# Patient Record
Sex: FEMALE | Race: BLACK OR AFRICAN AMERICAN | NOT HISPANIC OR LATINO | Employment: FULL TIME | RURAL
[De-identification: names, ages, dates, MRNs, and addresses within clinical notes are randomized per-mention and may not be internally consistent; named-entity substitution may affect disease eponyms.]

---

## 2021-08-01 ENCOUNTER — HOSPITAL ENCOUNTER (EMERGENCY)
Facility: HOSPITAL | Age: 46
Discharge: HOME | End: 2021-08-01
Payer: OTHER GOVERNMENT

## 2021-08-01 VITALS
HEART RATE: 76 BPM | OXYGEN SATURATION: 98 % | SYSTOLIC BLOOD PRESSURE: 165 MMHG | BODY MASS INDEX: 29.66 KG/M2 | WEIGHT: 189 LBS | DIASTOLIC BLOOD PRESSURE: 95 MMHG | TEMPERATURE: 100 F | HEIGHT: 67 IN | RESPIRATION RATE: 18 BRPM

## 2021-08-01 DIAGNOSIS — E87.6 HYPOKALEMIA: ICD-10-CM

## 2021-08-01 DIAGNOSIS — R11.10 VOMITING, INTRACTABILITY OF VOMITING NOT SPECIFIED, PRESENCE OF NAUSEA NOT SPECIFIED, UNSPECIFIED VOMITING TYPE: ICD-10-CM

## 2021-08-01 DIAGNOSIS — U07.1 COVID-19: Primary | ICD-10-CM

## 2021-08-01 LAB
ANION GAP SERPL CALCULATED.3IONS-SCNC: 17 MMOL/L (ref 7–16)
ATYPICAL LYMPHOCYTES: ABNORMAL
BASOPHILS # BLD AUTO: 0.01 K/UL (ref 0–0.2)
BASOPHILS NFR BLD AUTO: 0.2 % (ref 0–1)
BUN SERPL-MCNC: 7 MG/DL (ref 7–18)
BUN/CREAT SERPL: 10 (ref 6–20)
CALCIUM SERPL-MCNC: 8.1 MG/DL (ref 8.5–10.1)
CHLORIDE SERPL-SCNC: 107 MMOL/L (ref 98–107)
CO2 SERPL-SCNC: 23 MMOL/L (ref 21–32)
CREAT SERPL-MCNC: 0.73 MG/DL (ref 0.55–1.02)
DIFFERENTIAL METHOD BLD: ABNORMAL
EOSINOPHIL # BLD AUTO: 0 K/UL (ref 0–0.5)
EOSINOPHIL NFR BLD AUTO: 0 % (ref 1–4)
ERYTHROCYTE [DISTWIDTH] IN BLOOD BY AUTOMATED COUNT: 13.2 % (ref 11.5–14.5)
FLUAV AG UPPER RESP QL IA.RAPID: NEGATIVE
FLUBV AG UPPER RESP QL IA.RAPID: NEGATIVE
GLUCOSE SERPL-MCNC: 83 MG/DL (ref 74–106)
HCT VFR BLD AUTO: 40.9 % (ref 38–47)
HGB BLD-MCNC: 13.6 G/DL (ref 12–16)
IMM GRANULOCYTES # BLD AUTO: 0.01 K/UL (ref 0–0.04)
IMM GRANULOCYTES NFR BLD: 0.2 % (ref 0–0.4)
LYMPHOCYTES # BLD AUTO: 1.15 K/UL (ref 1–4.8)
LYMPHOCYTES NFR BLD AUTO: 23 % (ref 27–41)
LYMPHOCYTES NFR BLD MANUAL: 22 % (ref 27–41)
MCH RBC QN AUTO: 28.7 PG (ref 27–31)
MCHC RBC AUTO-ENTMCNC: 33.3 G/DL (ref 32–36)
MCV RBC AUTO: 86.3 FL (ref 80–96)
MONOCYTES # BLD AUTO: 0.49 K/UL (ref 0–0.8)
MONOCYTES NFR BLD AUTO: 9.8 % (ref 2–6)
MONOCYTES NFR BLD MANUAL: 9 % (ref 2–6)
MPC BLD CALC-MCNC: 13.6 FL (ref 9.4–12.4)
NEUTROPHILS # BLD AUTO: 3.34 K/UL (ref 1.8–7.7)
NEUTROPHILS NFR BLD AUTO: 66.8 % (ref 53–65)
NEUTS BAND NFR BLD MANUAL: 2 % (ref 1–5)
NEUTS SEG NFR BLD MANUAL: 67 % (ref 50–62)
NRBC # BLD AUTO: 0 X10E3/UL
NRBC, AUTO (.00): 0 %
PLATELET # BLD AUTO: 134 K/UL (ref 150–400)
PLATELET MORPHOLOGY: ABNORMAL
POTASSIUM SERPL-SCNC: 2.8 MMOL/L (ref 3.5–5.1)
RBC # BLD AUTO: 4.74 M/UL (ref 4.2–5.4)
SARS-COV+SARS-COV-2 AG RESP QL IA.RAPID: POSITIVE
SODIUM SERPL-SCNC: 144 MMOL/L (ref 136–145)
WBC # BLD AUTO: 5 K/UL (ref 4.5–11)

## 2021-08-01 PROCEDURE — 96365 THER/PROPH/DIAG IV INF INIT: CPT

## 2021-08-01 PROCEDURE — 25000003 PHARM REV CODE 250: Performed by: NURSE PRACTITIONER

## 2021-08-01 PROCEDURE — 96375 TX/PRO/DX INJ NEW DRUG ADDON: CPT

## 2021-08-01 PROCEDURE — 82374 ASSAY BLOOD CARBON DIOXIDE: CPT | Performed by: NURSE PRACTITIONER

## 2021-08-01 PROCEDURE — 99284 EMERGENCY DEPT VISIT MOD MDM: CPT | Mod: ,,, | Performed by: NURSE PRACTITIONER

## 2021-08-01 PROCEDURE — 99284 EMERGENCY DEPT VISIT MOD MDM: CPT | Mod: 25

## 2021-08-01 PROCEDURE — 85025 COMPLETE CBC W/AUTO DIFF WBC: CPT | Performed by: NURSE PRACTITIONER

## 2021-08-01 PROCEDURE — 96361 HYDRATE IV INFUSION ADD-ON: CPT

## 2021-08-01 PROCEDURE — 36415 COLL VENOUS BLD VENIPUNCTURE: CPT | Performed by: NURSE PRACTITIONER

## 2021-08-01 PROCEDURE — 99284 PR EMERGENCY DEPT VISIT,LEVEL IV: ICD-10-PCS | Mod: ,,, | Performed by: NURSE PRACTITIONER

## 2021-08-01 PROCEDURE — 63600175 PHARM REV CODE 636 W HCPCS: Performed by: NURSE PRACTITIONER

## 2021-08-01 PROCEDURE — 87428 SARSCOV & INF VIR A&B AG IA: CPT | Performed by: NURSE PRACTITIONER

## 2021-08-01 RX ORDER — ONDANSETRON 2 MG/ML
4 INJECTION INTRAMUSCULAR; INTRAVENOUS ONCE
Status: DISCONTINUED | OUTPATIENT
Start: 2021-08-01 | End: 2021-08-01

## 2021-08-01 RX ORDER — POTASSIUM CHLORIDE 20 MEQ/1
40 TABLET, EXTENDED RELEASE ORAL ONCE
Status: COMPLETED | OUTPATIENT
Start: 2021-08-01 | End: 2021-08-01

## 2021-08-01 RX ORDER — POTASSIUM CHLORIDE 7.45 MG/ML
10 INJECTION INTRAVENOUS ONCE
Status: COMPLETED | OUTPATIENT
Start: 2021-08-01 | End: 2021-08-01

## 2021-08-01 RX ORDER — ACETAMINOPHEN 500 MG
1000 TABLET ORAL
Status: COMPLETED | OUTPATIENT
Start: 2021-08-01 | End: 2021-08-01

## 2021-08-01 RX ORDER — ONDANSETRON 2 MG/ML
4 INJECTION INTRAMUSCULAR; INTRAVENOUS ONCE
Status: COMPLETED | OUTPATIENT
Start: 2021-08-01 | End: 2021-08-01

## 2021-08-01 RX ORDER — POTASSIUM CHLORIDE 750 MG/1
10 TABLET, EXTENDED RELEASE ORAL DAILY
Qty: 30 TABLET | Refills: 0 | Status: SHIPPED | OUTPATIENT
Start: 2021-08-01 | End: 2021-08-06

## 2021-08-01 RX ORDER — ONDANSETRON 4 MG/1
4 TABLET, FILM COATED ORAL EVERY 6 HOURS
Qty: 12 TABLET | Refills: 0 | Status: SHIPPED | OUTPATIENT
Start: 2021-08-01 | End: 2021-10-10 | Stop reason: SDUPTHER

## 2021-08-01 RX ADMIN — SODIUM CHLORIDE 1000 ML: 9 INJECTION, SOLUTION INTRAVENOUS at 04:08

## 2021-08-01 RX ADMIN — SODIUM CHLORIDE 500 ML: 9 INJECTION, SOLUTION INTRAVENOUS at 07:08

## 2021-08-01 RX ADMIN — ONDANSETRON HYDROCHLORIDE 4 MG: 2 SOLUTION INTRAMUSCULAR; INTRAVENOUS at 04:08

## 2021-08-01 RX ADMIN — POTASSIUM CHLORIDE 10 MEQ: 7.46 INJECTION, SOLUTION INTRAVENOUS at 07:08

## 2021-08-01 RX ADMIN — POTASSIUM CHLORIDE 40 MEQ: 20 TABLET, EXTENDED RELEASE ORAL at 06:08

## 2021-08-01 RX ADMIN — ACETAMINOPHEN 1000 MG: 500 TABLET ORAL at 04:08

## 2021-10-10 ENCOUNTER — HOSPITAL ENCOUNTER (EMERGENCY)
Facility: HOSPITAL | Age: 46
Discharge: HOME | End: 2021-10-10

## 2021-10-10 ENCOUNTER — HOSPITAL ENCOUNTER (EMERGENCY)
Facility: HOSPITAL | Age: 46
Discharge: HOME | End: 2021-10-10
Attending: FAMILY MEDICINE

## 2021-10-10 VITALS
HEIGHT: 66 IN | BODY MASS INDEX: 32.14 KG/M2 | OXYGEN SATURATION: 98 % | RESPIRATION RATE: 15 BRPM | DIASTOLIC BLOOD PRESSURE: 89 MMHG | WEIGHT: 200 LBS | HEART RATE: 72 BPM | SYSTOLIC BLOOD PRESSURE: 185 MMHG

## 2021-10-10 VITALS
OXYGEN SATURATION: 98 % | TEMPERATURE: 99 F | RESPIRATION RATE: 20 BRPM | WEIGHT: 200 LBS | HEART RATE: 101 BPM | SYSTOLIC BLOOD PRESSURE: 175 MMHG | BODY MASS INDEX: 32.14 KG/M2 | DIASTOLIC BLOOD PRESSURE: 107 MMHG | HEIGHT: 66 IN

## 2021-10-10 DIAGNOSIS — F12.920 CANNABIS INTOXICATION WITHOUT COMPLICATION: ICD-10-CM

## 2021-10-10 DIAGNOSIS — R10.10 PAIN OF UPPER ABDOMEN: Primary | ICD-10-CM

## 2021-10-10 DIAGNOSIS — K52.9 GASTROENTERITIS, ACUTE: Primary | ICD-10-CM

## 2021-10-10 DIAGNOSIS — R10.9 ABDOMINAL PAIN: ICD-10-CM

## 2021-10-10 LAB
ALBUMIN SERPL BCP-MCNC: 4.4 G/DL (ref 3.5–5)
ALBUMIN/GLOB SERPL: 1.1 {RATIO}
ALP SERPL-CCNC: 84 U/L (ref 39–100)
ALT SERPL W P-5'-P-CCNC: 21 U/L (ref 13–56)
AMPHET UR QL SCN: NEGATIVE
ANION GAP SERPL CALCULATED.3IONS-SCNC: 20 MMOL/L (ref 7–16)
AST SERPL W P-5'-P-CCNC: 15 U/L (ref 15–37)
B-HCG UR QL: NEGATIVE
BACTERIA #/AREA URNS HPF: ABNORMAL /HPF
BARBITURATES UR QL SCN: NEGATIVE
BASOPHILS # BLD AUTO: 0.07 K/UL (ref 0–0.2)
BASOPHILS NFR BLD AUTO: 0.7 % (ref 0–1)
BENZODIAZ METAB UR QL SCN: NEGATIVE
BILIRUB SERPL-MCNC: 0.4 MG/DL (ref 0–1.2)
BILIRUB UR QL STRIP: NEGATIVE
BUN SERPL-MCNC: 7 MG/DL (ref 7–18)
BUN/CREAT SERPL: 9 (ref 6–20)
CALCIUM SERPL-MCNC: 9 MG/DL (ref 8.5–10.1)
CANNABINOIDS UR QL SCN: POSITIVE
CHLORIDE SERPL-SCNC: 103 MMOL/L (ref 98–107)
CLARITY UR: CLEAR
CO2 SERPL-SCNC: 24 MMOL/L (ref 21–32)
COCAINE UR QL SCN: NEGATIVE
COLOR UR: ABNORMAL
CREAT SERPL-MCNC: 0.82 MG/DL (ref 0.55–1.02)
CTP QC/QA: YES
DIFFERENTIAL METHOD BLD: ABNORMAL
EOSINOPHIL # BLD AUTO: 0.03 K/UL (ref 0–0.5)
EOSINOPHIL NFR BLD AUTO: 0.3 % (ref 1–4)
ERYTHROCYTE [DISTWIDTH] IN BLOOD BY AUTOMATED COUNT: 13.8 % (ref 11.5–14.5)
ETHANOL, BLOOD (CATEGORY): NOT DETECTED
FLUAV AG UPPER RESP QL IA.RAPID: NEGATIVE
FLUBV AG UPPER RESP QL IA.RAPID: NEGATIVE
GLOBULIN SER-MCNC: 4.1 G/DL (ref 2–4)
GLUCOSE SERPL-MCNC: 116 MG/DL (ref 74–106)
GLUCOSE UR STRIP-MCNC: NEGATIVE MG/DL
HCT VFR BLD AUTO: 47.5 % (ref 38–47)
HGB BLD-MCNC: 15.3 G/DL (ref 12–16)
IMM GRANULOCYTES # BLD AUTO: 0.13 K/UL (ref 0–0.04)
IMM GRANULOCYTES NFR BLD: 1.3 % (ref 0–0.4)
KETONES UR STRIP-SCNC: NEGATIVE MG/DL
LEUKOCYTE ESTERASE UR QL STRIP: ABNORMAL
LIPASE SERPL-CCNC: 125 U/L (ref 73–393)
LYMPHOCYTES # BLD AUTO: 1.7 K/UL (ref 1–4.8)
LYMPHOCYTES NFR BLD AUTO: 17.5 % (ref 27–41)
MCH RBC QN AUTO: 28.1 PG (ref 27–31)
MCHC RBC AUTO-ENTMCNC: 32.2 G/DL (ref 32–36)
MCV RBC AUTO: 87.3 FL (ref 80–96)
MONOCYTES # BLD AUTO: 0.38 K/UL (ref 0–0.8)
MONOCYTES NFR BLD AUTO: 3.9 % (ref 2–6)
MPC BLD CALC-MCNC: 12.7 FL (ref 9.4–12.4)
MUCOUS THREADS #/AREA URNS HPF: ABNORMAL /HPF
NEUTROPHILS # BLD AUTO: 7.42 K/UL (ref 1.8–7.7)
NEUTROPHILS NFR BLD AUTO: 76.3 % (ref 53–65)
NITRITE UR QL STRIP: NEGATIVE
NRBC # BLD AUTO: 0 X10E3/UL
NRBC, AUTO (.00): 0 %
OPIATES UR QL SCN: NEGATIVE
PCP UR QL SCN: NEGATIVE
PH UR STRIP: 8.5 PH UNITS
PLATELET # BLD AUTO: 198 K/UL (ref 150–400)
POTASSIUM SERPL-SCNC: 3.9 MMOL/L (ref 3.5–5.1)
PROT SERPL-MCNC: 8.5 G/DL (ref 6.4–8.2)
PROT UR QL STRIP: 30
RBC # BLD AUTO: 5.44 M/UL (ref 4.2–5.4)
RBC # UR STRIP: ABNORMAL /UL
RBC #/AREA URNS HPF: ABNORMAL /HPF
SARS-COV+SARS-COV-2 AG RESP QL IA.RAPID: NEGATIVE
SODIUM SERPL-SCNC: 143 MMOL/L (ref 136–145)
SP GR UR STRIP: 1.02
SQUAMOUS #/AREA URNS LPF: ABNORMAL /LPF
TRICHOMONAS #/AREA URNS HPF: ABNORMAL /HPF
TROPONIN I SERPL-MCNC: <0.017 NG/ML
UROBILINOGEN UR STRIP-ACNC: 0.2 MG/DL
WBC # BLD AUTO: 9.73 K/UL (ref 4.5–11)
WBC #/AREA URNS HPF: ABNORMAL /HPF
YEAST #/AREA URNS HPF: ABNORMAL /HPF

## 2021-10-10 PROCEDURE — 85025 COMPLETE CBC W/AUTO DIFF WBC: CPT | Performed by: FAMILY MEDICINE

## 2021-10-10 PROCEDURE — 99284 EMERGENCY DEPT VISIT MOD MDM: CPT | Mod: ,,, | Performed by: FAMILY MEDICINE

## 2021-10-10 PROCEDURE — 99284 PR EMERGENCY DEPT VISIT,LEVEL IV: ICD-10-PCS | Mod: ,,, | Performed by: FAMILY MEDICINE

## 2021-10-10 PROCEDURE — 93010 EKG 12-LEAD: ICD-10-PCS | Mod: ,,, | Performed by: HOSPITALIST

## 2021-10-10 PROCEDURE — 63600175 PHARM REV CODE 636 W HCPCS: Performed by: FAMILY MEDICINE

## 2021-10-10 PROCEDURE — 81025 URINE PREGNANCY TEST: CPT | Performed by: FAMILY MEDICINE

## 2021-10-10 PROCEDURE — 80053 COMPREHEN METABOLIC PANEL: CPT | Performed by: FAMILY MEDICINE

## 2021-10-10 PROCEDURE — 96372 THER/PROPH/DIAG INJ SC/IM: CPT

## 2021-10-10 PROCEDURE — 99284 EMERGENCY DEPT VISIT MOD MDM: CPT | Mod: 25

## 2021-10-10 PROCEDURE — 81001 URINALYSIS AUTO W/SCOPE: CPT | Performed by: FAMILY MEDICINE

## 2021-10-10 PROCEDURE — 96375 TX/PRO/DX INJ NEW DRUG ADDON: CPT

## 2021-10-10 PROCEDURE — 83690 ASSAY OF LIPASE: CPT | Performed by: FAMILY MEDICINE

## 2021-10-10 PROCEDURE — 96374 THER/PROPH/DIAG INJ IV PUSH: CPT

## 2021-10-10 PROCEDURE — 36415 COLL VENOUS BLD VENIPUNCTURE: CPT | Performed by: FAMILY MEDICINE

## 2021-10-10 PROCEDURE — C9113 INJ PANTOPRAZOLE SODIUM, VIA: HCPCS | Performed by: FAMILY MEDICINE

## 2021-10-10 PROCEDURE — 25000003 PHARM REV CODE 250: Performed by: FAMILY MEDICINE

## 2021-10-10 PROCEDURE — 87428 SARSCOV & INF VIR A&B AG IA: CPT | Performed by: NURSE PRACTITIONER

## 2021-10-10 PROCEDURE — 81003 URINALYSIS AUTO W/O SCOPE: CPT | Performed by: FAMILY MEDICINE

## 2021-10-10 PROCEDURE — 93010 ELECTROCARDIOGRAM REPORT: CPT | Mod: ,,, | Performed by: HOSPITALIST

## 2021-10-10 PROCEDURE — 99285 EMERGENCY DEPT VISIT HI MDM: CPT | Mod: 25

## 2021-10-10 PROCEDURE — 96361 HYDRATE IV INFUSION ADD-ON: CPT

## 2021-10-10 PROCEDURE — 84484 ASSAY OF TROPONIN QUANT: CPT | Performed by: FAMILY MEDICINE

## 2021-10-10 PROCEDURE — 80307 DRUG TEST PRSMV CHEM ANLYZR: CPT | Performed by: FAMILY MEDICINE

## 2021-10-10 PROCEDURE — 82077 ASSAY SPEC XCP UR&BREATH IA: CPT | Performed by: FAMILY MEDICINE

## 2021-10-10 PROCEDURE — 93005 ELECTROCARDIOGRAM TRACING: CPT

## 2021-10-10 PROCEDURE — 63600175 PHARM REV CODE 636 W HCPCS: Performed by: NURSE PRACTITIONER

## 2021-10-10 RX ORDER — SODIUM CHLORIDE 9 MG/ML
INJECTION, SOLUTION INTRAVENOUS
Status: COMPLETED | OUTPATIENT
Start: 2021-10-10 | End: 2021-10-10

## 2021-10-10 RX ORDER — ONDANSETRON 4 MG/1
4 TABLET, FILM COATED ORAL EVERY 6 HOURS
Qty: 12 TABLET | Refills: 0 | Status: SHIPPED | OUTPATIENT
Start: 2021-10-10 | End: 2022-08-11 | Stop reason: ALTCHOICE

## 2021-10-10 RX ORDER — PROMETHAZINE HYDROCHLORIDE 25 MG/ML
25 INJECTION, SOLUTION INTRAMUSCULAR; INTRAVENOUS
Status: COMPLETED | OUTPATIENT
Start: 2021-10-10 | End: 2021-10-10

## 2021-10-10 RX ORDER — PROMETHAZINE HYDROCHLORIDE 25 MG/1
25 TABLET ORAL EVERY 6 HOURS PRN
Qty: 10 TABLET | Refills: 0 | Status: SHIPPED | OUTPATIENT
Start: 2021-10-10 | End: 2022-07-11

## 2021-10-10 RX ORDER — PANTOPRAZOLE SODIUM 40 MG/10ML
40 INJECTION, POWDER, LYOPHILIZED, FOR SOLUTION INTRAVENOUS
Status: COMPLETED | OUTPATIENT
Start: 2021-10-10 | End: 2021-10-10

## 2021-10-10 RX ORDER — ONDANSETRON 2 MG/ML
4 INJECTION INTRAMUSCULAR; INTRAVENOUS
Status: COMPLETED | OUTPATIENT
Start: 2021-10-10 | End: 2021-10-10

## 2021-10-10 RX ORDER — POTASSIUM CHLORIDE 20 MEQ/1
40 TABLET, EXTENDED RELEASE ORAL
Status: COMPLETED | OUTPATIENT
Start: 2021-10-10 | End: 2021-10-10

## 2021-10-10 RX ORDER — PANTOPRAZOLE SODIUM 20 MG/1
20 TABLET, DELAYED RELEASE ORAL DAILY
Qty: 30 TABLET | Refills: 0 | Status: SHIPPED | OUTPATIENT
Start: 2021-10-10 | End: 2023-09-06

## 2021-10-10 RX ORDER — MORPHINE SULFATE 4 MG/ML
4 INJECTION, SOLUTION INTRAMUSCULAR; INTRAVENOUS
Status: COMPLETED | OUTPATIENT
Start: 2021-10-10 | End: 2021-10-10

## 2021-10-10 RX ORDER — SODIUM CHLORIDE 0.9 % (FLUSH) 0.9 %
10 SYRINGE (ML) INJECTION
Status: DISCONTINUED | OUTPATIENT
Start: 2021-10-10 | End: 2021-10-10 | Stop reason: HOSPADM

## 2021-10-10 RX ADMIN — PROMETHAZINE HYDROCHLORIDE 25 MG: 25 INJECTION INTRAMUSCULAR; INTRAVENOUS at 07:10

## 2021-10-10 RX ADMIN — ONDANSETRON 4 MG: 2 INJECTION INTRAMUSCULAR; INTRAVENOUS at 08:10

## 2021-10-10 RX ADMIN — SODIUM CHLORIDE: 9 INJECTION, SOLUTION INTRAVENOUS at 08:10

## 2021-10-10 RX ADMIN — PANTOPRAZOLE SODIUM 40 MG: 40 INJECTION, POWDER, FOR SOLUTION INTRAVENOUS at 08:10

## 2021-10-10 RX ADMIN — MORPHINE SULFATE 4 MG: 4 INJECTION INTRAVENOUS at 08:10

## 2021-10-10 RX ADMIN — POTASSIUM CHLORIDE 40 MEQ: 1500 TABLET, EXTENDED RELEASE ORAL at 08:10

## 2021-10-11 ENCOUNTER — HOSPITAL ENCOUNTER (EMERGENCY)
Facility: HOSPITAL | Age: 46
Discharge: HOME | End: 2021-10-12
Attending: EMERGENCY MEDICINE

## 2021-10-11 DIAGNOSIS — S61.210A LACERATION OF RIGHT INDEX FINGER WITHOUT FOREIGN BODY WITHOUT DAMAGE TO NAIL, INITIAL ENCOUNTER: ICD-10-CM

## 2021-10-11 DIAGNOSIS — S61.210A LACERATION OF RIGHT INDEX FINGER WITHOUT FOREIGN BODY WITHOUT DAMAGE TO NAIL: ICD-10-CM

## 2021-10-11 DIAGNOSIS — S00.01XA ABRASION OF SCALP: ICD-10-CM

## 2021-10-11 DIAGNOSIS — S60.221A CONTUSION OF RIGHT HAND, INITIAL ENCOUNTER: ICD-10-CM

## 2021-10-11 DIAGNOSIS — R07.9 CHEST PAIN: ICD-10-CM
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
## 2021-10-11 DIAGNOSIS — S00.01XA ABRASION OF SCALP, INITIAL ENCOUNTER: ICD-10-CM

## 2021-10-11 PROCEDURE — 12001 PR RESUPERF WND BODY <2.5CM: ICD-10-PCS | Mod: ,,, | Performed by: EMERGENCY MEDICINE

## 2021-10-11 PROCEDURE — 99283 PR EMERGENCY DEPT VISIT,LEVEL III: ICD-10-PCS | Mod: 25,,, | Performed by: EMERGENCY MEDICINE

## 2021-10-11 PROCEDURE — 12001 RPR S/N/AX/GEN/TRNK 2.5CM/<: CPT | Mod: ,,, | Performed by: EMERGENCY MEDICINE

## 2021-10-11 PROCEDURE — 12001 RPR S/N/AX/GEN/TRNK 2.5CM/<: CPT

## 2021-10-11 PROCEDURE — 99283 EMERGENCY DEPT VISIT LOW MDM: CPT | Mod: 25,,, | Performed by: EMERGENCY MEDICINE

## 2021-10-11 PROCEDURE — 99284 EMERGENCY DEPT VISIT MOD MDM: CPT | Mod: 25

## 2021-10-12 VITALS
HEART RATE: 89 BPM | DIASTOLIC BLOOD PRESSURE: 107 MMHG | WEIGHT: 234 LBS | OXYGEN SATURATION: 98 % | SYSTOLIC BLOOD PRESSURE: 169 MMHG | HEIGHT: 67 IN | TEMPERATURE: 99 F | RESPIRATION RATE: 18 BRPM | BODY MASS INDEX: 36.73 KG/M2
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
## 2021-10-12 PROBLEM — S00.01XA ABRASION OF SCALP: Status: ACTIVE | Noted: 2021-10-12

## 2021-10-12 PROBLEM — S61.210A LACERATION OF RIGHT INDEX FINGER WITHOUT FOREIGN BODY WITHOUT DAMAGE TO NAIL: Status: ACTIVE | Noted: 2021-10-12

## 2021-10-12 PROCEDURE — 90715 TDAP VACCINE 7 YRS/> IM: CPT | Performed by: EMERGENCY MEDICINE

## 2021-10-12 PROCEDURE — 90471 IMMUNIZATION ADMIN: CPT | Performed by: EMERGENCY MEDICINE

## 2021-10-12 PROCEDURE — 63600175 PHARM REV CODE 636 W HCPCS: Performed by: EMERGENCY MEDICINE

## 2021-10-12 RX ADMIN — TETANUS TOXOID, REDUCED DIPHTHERIA TOXOID AND ACELLULAR PERTUSSIS VACCINE, ADSORBED 0.5 ML: 5; 2.5; 8; 8; 2.5 SUSPENSION INTRAMUSCULAR at 12:10

## 2021-12-23 ENCOUNTER — HOSPITAL ENCOUNTER (EMERGENCY)
Facility: HOSPITAL | Age: 46
Discharge: HOME | End: 2021-12-23

## 2021-12-23 ENCOUNTER — HOSPITAL ENCOUNTER (EMERGENCY)
Facility: HOSPITAL | Age: 46
Discharge: HOME | End: 2021-12-23
Attending: FAMILY MEDICINE

## 2021-12-23 VITALS
TEMPERATURE: 99 F | BODY MASS INDEX: 33.27 KG/M2 | OXYGEN SATURATION: 98 % | DIASTOLIC BLOOD PRESSURE: 74 MMHG | WEIGHT: 207 LBS | SYSTOLIC BLOOD PRESSURE: 140 MMHG | RESPIRATION RATE: 16 BRPM | HEART RATE: 84 BPM | HEIGHT: 66 IN

## 2021-12-23 VITALS
RESPIRATION RATE: 20 BRPM | TEMPERATURE: 99 F | BODY MASS INDEX: 33.27 KG/M2 | HEIGHT: 66 IN | WEIGHT: 207 LBS | OXYGEN SATURATION: 94 % | HEART RATE: 79 BPM | SYSTOLIC BLOOD PRESSURE: 176 MMHG | DIASTOLIC BLOOD PRESSURE: 107 MMHG

## 2021-12-23 DIAGNOSIS — S61.210A LACERATION OF RIGHT INDEX FINGER WITHOUT FOREIGN BODY WITHOUT DAMAGE TO NAIL: ICD-10-CM

## 2021-12-23 DIAGNOSIS — S00.01XA ABRASION OF SCALP: ICD-10-CM

## 2021-12-23 DIAGNOSIS — N39.0 URINARY TRACT INFECTION WITHOUT HEMATURIA, SITE UNSPECIFIED: Primary | ICD-10-CM
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
## 2021-12-23 DIAGNOSIS — R10.9 ABDOMINAL PAIN, UNSPECIFIED ABDOMINAL LOCATION: Primary | ICD-10-CM

## 2021-12-23 LAB
ALBUMIN SERPL BCP-MCNC: 3.9 G/DL (ref 3.5–5)
ALBUMIN/GLOB SERPL: 1 {RATIO}
ALP SERPL-CCNC: 72 U/L (ref 39–100)
ALT SERPL W P-5'-P-CCNC: 14 U/L (ref 13–56)
ANION GAP SERPL CALCULATED.3IONS-SCNC: 19 MMOL/L (ref 7–16)
AST SERPL W P-5'-P-CCNC: 14 U/L (ref 15–37)
BACTERIA #/AREA URNS HPF: ABNORMAL /HPF
BASOPHILS # BLD AUTO: 0.05 K/UL (ref 0–0.2)
BASOPHILS NFR BLD AUTO: 0.6 % (ref 0–1)
BILIRUB SERPL-MCNC: 0.4 MG/DL (ref 0–1.2)
BILIRUB UR QL STRIP: NEGATIVE
BUN SERPL-MCNC: 10 MG/DL (ref 7–18)
BUN/CREAT SERPL: 11 (ref 6–20)
CALCIUM SERPL-MCNC: 8.8 MG/DL (ref 8.5–10.1)
CHLORIDE SERPL-SCNC: 108 MMOL/L (ref 98–107)
CLARITY UR: ABNORMAL
CO2 SERPL-SCNC: 21 MMOL/L (ref 21–32)
COLOR UR: ABNORMAL
CREAT SERPL-MCNC: 0.93 MG/DL (ref 0.55–1.02)
DIFFERENTIAL METHOD BLD: ABNORMAL
EOSINOPHIL # BLD AUTO: 0.08 K/UL (ref 0–0.5)
EOSINOPHIL NFR BLD AUTO: 0.9 % (ref 1–4)
ERYTHROCYTE [DISTWIDTH] IN BLOOD BY AUTOMATED COUNT: 13.9 % (ref 11.5–14.5)
GLOBULIN SER-MCNC: 4.1 G/DL (ref 2–4)
GLUCOSE SERPL-MCNC: 113 MG/DL (ref 74–106)
GLUCOSE UR STRIP-MCNC: NEGATIVE MG/DL
HCT VFR BLD AUTO: 43.3 % (ref 38–47)
HGB BLD-MCNC: 13.9 G/DL (ref 12–16)
IMM GRANULOCYTES # BLD AUTO: 0.07 K/UL (ref 0–0.04)
IMM GRANULOCYTES NFR BLD: 0.8 % (ref 0–0.4)
KETONES UR STRIP-SCNC: NEGATIVE MG/DL
LEUKOCYTE ESTERASE UR QL STRIP: ABNORMAL
LIPASE SERPL-CCNC: 215 U/L (ref 73–393)
LYMPHOCYTES # BLD AUTO: 1.47 K/UL (ref 1–4.8)
LYMPHOCYTES NFR BLD AUTO: 16.9 % (ref 27–41)
MCH RBC QN AUTO: 28.2 PG (ref 27–31)
MCHC RBC AUTO-ENTMCNC: 32.1 G/DL (ref 32–36)
MCV RBC AUTO: 87.8 FL (ref 80–96)
MONOCYTES # BLD AUTO: 0.63 K/UL (ref 0–0.8)
MONOCYTES NFR BLD AUTO: 7.2 % (ref 2–6)
MPC BLD CALC-MCNC: 13 FL (ref 9.4–12.4)
MUCOUS THREADS #/AREA URNS HPF: ABNORMAL /HPF
NEUTROPHILS # BLD AUTO: 6.39 K/UL (ref 1.8–7.7)
NEUTROPHILS NFR BLD AUTO: 73.6 % (ref 53–65)
NITRITE UR QL STRIP: NEGATIVE
NRBC # BLD AUTO: 0 X10E3/UL
NRBC, AUTO (.00): 0 %
PH UR STRIP: 7.5 PH UNITS
PLATELET # BLD AUTO: 219 K/UL (ref 150–400)
POTASSIUM SERPL-SCNC: 3.6 MMOL/L (ref 3.5–5.1)
PROT SERPL-MCNC: 8 G/DL (ref 6.4–8.2)
PROT UR QL STRIP: NEGATIVE
RBC # BLD AUTO: 4.93 M/UL (ref 4.2–5.4)
RBC # UR STRIP: ABNORMAL /UL
RBC #/AREA URNS HPF: ABNORMAL /HPF
SODIUM SERPL-SCNC: 144 MMOL/L (ref 136–145)
SP GR UR STRIP: 1.02
SQUAMOUS #/AREA URNS LPF: ABNORMAL /LPF
UROBILINOGEN UR STRIP-ACNC: 0.2 MG/DL
WBC # BLD AUTO: 8.69 K/UL (ref 4.5–11)
WBC #/AREA URNS HPF: ABNORMAL /HPF

## 2021-12-23 PROCEDURE — 81001 URINALYSIS AUTO W/SCOPE: CPT | Performed by: FAMILY MEDICINE

## 2021-12-23 PROCEDURE — 25000003 PHARM REV CODE 250: Performed by: FAMILY MEDICINE

## 2021-12-23 PROCEDURE — 99283 EMERGENCY DEPT VISIT LOW MDM: CPT | Mod: ,,, | Performed by: FAMILY MEDICINE

## 2021-12-23 PROCEDURE — 96372 THER/PROPH/DIAG INJ SC/IM: CPT | Mod: 59

## 2021-12-23 PROCEDURE — 80053 COMPREHEN METABOLIC PANEL: CPT | Performed by: FAMILY MEDICINE

## 2021-12-23 PROCEDURE — 63600175 PHARM REV CODE 636 W HCPCS: Performed by: FAMILY MEDICINE

## 2021-12-23 PROCEDURE — 96375 TX/PRO/DX INJ NEW DRUG ADDON: CPT

## 2021-12-23 PROCEDURE — 85025 COMPLETE CBC W/AUTO DIFF WBC: CPT | Performed by: FAMILY MEDICINE

## 2021-12-23 PROCEDURE — 99283 PR EMERGENCY DEPT VISIT,LEVEL III: ICD-10-PCS | Mod: ,,, | Performed by: FAMILY MEDICINE

## 2021-12-23 PROCEDURE — 99499 UNLISTED E&M SERVICE: CPT | Mod: ,,, | Performed by: NURSE PRACTITIONER

## 2021-12-23 PROCEDURE — 99284 EMERGENCY DEPT VISIT MOD MDM: CPT | Mod: 25

## 2021-12-23 PROCEDURE — 99499 NO LOS: ICD-10-PCS | Mod: ,,, | Performed by: NURSE PRACTITIONER

## 2021-12-23 PROCEDURE — 63600175 PHARM REV CODE 636 W HCPCS: Performed by: NURSE PRACTITIONER

## 2021-12-23 PROCEDURE — 83690 ASSAY OF LIPASE: CPT | Performed by: FAMILY MEDICINE

## 2021-12-23 PROCEDURE — 96376 TX/PRO/DX INJ SAME DRUG ADON: CPT

## 2021-12-23 PROCEDURE — 87186 SC STD MICRODIL/AGAR DIL: CPT | Performed by: FAMILY MEDICINE

## 2021-12-23 PROCEDURE — 36415 COLL VENOUS BLD VENIPUNCTURE: CPT | Performed by: FAMILY MEDICINE

## 2021-12-23 PROCEDURE — 96374 THER/PROPH/DIAG INJ IV PUSH: CPT

## 2021-12-23 PROCEDURE — 81003 URINALYSIS AUTO W/O SCOPE: CPT | Performed by: FAMILY MEDICINE

## 2021-12-23 PROCEDURE — 87077 CULTURE AEROBIC IDENTIFY: CPT | Performed by: FAMILY MEDICINE

## 2021-12-23 PROCEDURE — 25500020 PHARM REV CODE 255: Performed by: FAMILY MEDICINE

## 2021-12-23 RX ORDER — ONDANSETRON 4 MG/1
4 TABLET, ORALLY DISINTEGRATING ORAL EVERY 6 HOURS PRN
Status: DISCONTINUED | OUTPATIENT
Start: 2021-12-23 | End: 2021-12-23 | Stop reason: HOSPADM

## 2021-12-23 RX ORDER — MORPHINE SULFATE 2 MG/ML
2 INJECTION, SOLUTION INTRAMUSCULAR; INTRAVENOUS
Status: COMPLETED | OUTPATIENT
Start: 2021-12-23 | End: 2021-12-23

## 2021-12-23 RX ORDER — HYDRALAZINE HYDROCHLORIDE 20 MG/ML
10 INJECTION INTRAMUSCULAR; INTRAVENOUS
Status: COMPLETED | OUTPATIENT
Start: 2021-12-23 | End: 2021-12-23

## 2021-12-23 RX ORDER — CEFTRIAXONE 1 G/1
1 INJECTION, POWDER, FOR SOLUTION INTRAMUSCULAR; INTRAVENOUS
Status: COMPLETED | OUTPATIENT
Start: 2021-12-23 | End: 2021-12-23

## 2021-12-23 RX ORDER — ONDANSETRON 2 MG/ML
4 INJECTION INTRAMUSCULAR; INTRAVENOUS
Status: COMPLETED | OUTPATIENT
Start: 2021-12-23 | End: 2021-12-23

## 2021-12-23 RX ORDER — METOCLOPRAMIDE HYDROCHLORIDE 5 MG/ML
10 INJECTION INTRAMUSCULAR; INTRAVENOUS
Status: COMPLETED | OUTPATIENT
Start: 2021-12-23 | End: 2021-12-23

## 2021-12-23 RX ORDER — CLONIDINE HYDROCHLORIDE 0.1 MG/1
0.1 TABLET ORAL
Status: COMPLETED | OUTPATIENT
Start: 2021-12-23 | End: 2021-12-23

## 2021-12-23 RX ORDER — SODIUM CHLORIDE 0.9 % (FLUSH) 0.9 %
10 SYRINGE (ML) INJECTION
Status: DISCONTINUED | OUTPATIENT
Start: 2021-12-23 | End: 2021-12-23 | Stop reason: HOSPADM

## 2021-12-23 RX ORDER — PROMETHAZINE HYDROCHLORIDE 25 MG/ML
12.5 INJECTION, SOLUTION INTRAMUSCULAR; INTRAVENOUS
Status: DISCONTINUED | OUTPATIENT
Start: 2021-12-23 | End: 2021-12-23

## 2021-12-23 RX ORDER — MORPHINE SULFATE 4 MG/ML
4 INJECTION, SOLUTION INTRAMUSCULAR; INTRAVENOUS
Status: COMPLETED | OUTPATIENT
Start: 2021-12-23 | End: 2021-12-23

## 2021-12-23 RX ORDER — HYOSCYAMINE SULFATE 0.125 MG
125 TABLET ORAL
Status: DISCONTINUED | OUTPATIENT
Start: 2021-12-23 | End: 2021-12-23

## 2021-12-23 RX ADMIN — CEFTRIAXONE SODIUM 1 G: 1 INJECTION, POWDER, FOR SOLUTION INTRAMUSCULAR; INTRAVENOUS at 08:12

## 2021-12-23 RX ADMIN — CLONIDINE HYDROCHLORIDE 0.1 MG: 0.1 TABLET ORAL at 01:12

## 2021-12-23 RX ADMIN — ONDANSETRON 4 MG: 4 TABLET, ORALLY DISINTEGRATING ORAL at 01:12

## 2021-12-23 RX ADMIN — METOCLOPRAMIDE 10 MG: 5 INJECTION, SOLUTION INTRAMUSCULAR; INTRAVENOUS at 11:12

## 2021-12-23 RX ADMIN — IOPAMIDOL 100 ML: 755 INJECTION, SOLUTION INTRAVENOUS at 10:12

## 2021-12-23 RX ADMIN — HYDRALAZINE HYDROCHLORIDE 10 MG: 20 INJECTION INTRAMUSCULAR; INTRAVENOUS at 12:12

## 2021-12-23 RX ADMIN — MORPHINE SULFATE 4 MG: 4 INJECTION INTRAVENOUS at 01:12

## 2021-12-23 RX ADMIN — MORPHINE SULFATE 2 MG: 2 INJECTION, SOLUTION INTRAMUSCULAR; INTRAVENOUS at 10:12

## 2021-12-23 RX ADMIN — ONDANSETRON 4 MG: 2 INJECTION INTRAMUSCULAR; INTRAVENOUS at 09:12

## 2021-12-23 RX ADMIN — MORPHINE SULFATE 2 MG: 2 INJECTION, SOLUTION INTRAMUSCULAR; INTRAVENOUS at 09:12

## 2021-12-25 LAB — UA COMPLETE W REFLEX CULTURE PNL UR: ABNORMAL

## 2022-05-11 ENCOUNTER — HOSPITAL ENCOUNTER (INPATIENT)
Facility: HOSPITAL | Age: 47
LOS: 4 days | Discharge: HOME | End: 2022-05-15
Attending: INTERNAL MEDICINE | Admitting: INTERNAL MEDICINE
Payer: MEDICAID

## 2022-05-11 DIAGNOSIS — R11.2 INTRACTABLE VOMITING WITH NAUSEA, UNSPECIFIED VOMITING TYPE: ICD-10-CM

## 2022-05-11 DIAGNOSIS — R03.0 ELEVATED BLOOD PRESSURE READING WITHOUT DIAGNOSIS OF HYPERTENSION: ICD-10-CM

## 2022-05-11 DIAGNOSIS — S61.210A LACERATION OF RIGHT INDEX FINGER WITHOUT FOREIGN BODY WITHOUT DAMAGE TO NAIL: ICD-10-CM

## 2022-05-11 DIAGNOSIS — N39.0 SEPSIS DUE TO GRAM-NEGATIVE UTI: ICD-10-CM

## 2022-05-11 DIAGNOSIS — S00.01XA ABRASION OF SCALP: ICD-10-CM

## 2022-05-11 DIAGNOSIS — A41.50 SEPSIS DUE TO GRAM-NEGATIVE UTI: ICD-10-CM

## 2022-05-11 DIAGNOSIS — R07.9 CHEST PAIN: ICD-10-CM

## 2022-05-11 DIAGNOSIS — I10 ESSENTIAL HYPERTENSION: ICD-10-CM

## 2022-05-11 DIAGNOSIS — N12 PYELONEPHRITIS: Primary | ICD-10-CM
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
## 2022-05-11 DIAGNOSIS — R11.10 INTRACTABLE VOMITING: ICD-10-CM

## 2022-05-11 DIAGNOSIS — N10 ACUTE PYELONEPHRITIS: ICD-10-CM

## 2022-05-11 DIAGNOSIS — E87.6 HYPOKALEMIA: ICD-10-CM

## 2022-05-11 LAB
ALBUMIN SERPL BCP-MCNC: 3.2 G/DL (ref 3.5–5)
ALBUMIN/GLOB SERPL: 0.8 {RATIO}
ALP SERPL-CCNC: 73 U/L (ref 39–100)
ALT SERPL W P-5'-P-CCNC: 26 U/L (ref 13–56)
AMYLASE SERPL-CCNC: 52 U/L (ref 25–115)
ANION GAP SERPL CALCULATED.3IONS-SCNC: 13 MMOL/L (ref 7–16)
AST SERPL W P-5'-P-CCNC: 26 U/L (ref 15–37)
BASOPHILS # BLD AUTO: 0.03 K/UL (ref 0–0.2)
BASOPHILS NFR BLD AUTO: 0.4 % (ref 0–1)
BILIRUB SERPL-MCNC: 0.8 MG/DL (ref 0–1.2)
BUN SERPL-MCNC: 5 MG/DL (ref 7–18)
BUN/CREAT SERPL: 6 (ref 6–20)
CALCIUM SERPL-MCNC: 8.8 MG/DL (ref 8.5–10.1)
CHLORIDE SERPL-SCNC: 108 MMOL/L (ref 98–107)
CO2 SERPL-SCNC: 20 MMOL/L (ref 21–32)
CREAT SERPL-MCNC: 0.81 MG/DL (ref 0.55–1.02)
DIFFERENTIAL METHOD BLD: ABNORMAL
EOSINOPHIL # BLD AUTO: 0 K/UL (ref 0–0.5)
EOSINOPHIL NFR BLD AUTO: 0 % (ref 1–4)
ERYTHROCYTE [DISTWIDTH] IN BLOOD BY AUTOMATED COUNT: 13.2 % (ref 11.5–14.5)
FLUAV AG UPPER RESP QL IA.RAPID: NEGATIVE
FLUBV AG UPPER RESP QL IA.RAPID: NEGATIVE
GLOBULIN SER-MCNC: 4.1 G/DL (ref 2–4)
GLUCOSE SERPL-MCNC: 100 MG/DL (ref 74–106)
HCT VFR BLD AUTO: 39.3 % (ref 38–47)
HGB BLD-MCNC: 13.2 G/DL (ref 12–16)
IMM GRANULOCYTES # BLD AUTO: 0.02 K/UL (ref 0–0.04)
IMM GRANULOCYTES NFR BLD: 0.3 % (ref 0–0.4)
LIPASE SERPL-CCNC: 82 U/L (ref 73–393)
LYMPHOCYTES # BLD AUTO: 1.27 K/UL (ref 1–4.8)
LYMPHOCYTES NFR BLD AUTO: 17.1 % (ref 27–41)
MCH RBC QN AUTO: 28.8 PG (ref 27–31)
MCHC RBC AUTO-ENTMCNC: 33.6 G/DL (ref 32–36)
MCV RBC AUTO: 85.6 FL (ref 80–96)
MONOCYTES # BLD AUTO: 1.09 K/UL (ref 0–0.8)
MONOCYTES NFR BLD AUTO: 14.7 % (ref 2–6)
MPC BLD CALC-MCNC: 13.1 FL (ref 9.4–12.4)
NEUTROPHILS # BLD AUTO: 5.03 K/UL (ref 1.8–7.7)
NEUTROPHILS NFR BLD AUTO: 67.5 % (ref 53–65)
NRBC # BLD AUTO: 0 X10E3/UL
NRBC, AUTO (.00): 0 %
PLATELET # BLD AUTO: 173 K/UL (ref 150–400)
PLATELET MORPHOLOGY: ABNORMAL
POTASSIUM SERPL-SCNC: 2.9 MMOL/L (ref 3.5–5.1)
PROT SERPL-MCNC: 7.3 G/DL (ref 6.4–8.2)
RBC # BLD AUTO: 4.59 M/UL (ref 4.2–5.4)
RBC MORPH BLD: NORMAL
SARS-COV+SARS-COV-2 AG RESP QL IA.RAPID: NEGATIVE
SODIUM SERPL-SCNC: 138 MMOL/L (ref 136–145)
WBC # BLD AUTO: 7.44 K/UL (ref 4.5–11)

## 2022-05-11 PROCEDURE — G0378 HOSPITAL OBSERVATION PER HR: HCPCS

## 2022-05-11 PROCEDURE — 96367 TX/PROPH/DG ADDL SEQ IV INF: CPT | Performed by: NURSE PRACTITIONER

## 2022-05-11 PROCEDURE — 63600175 PHARM REV CODE 636 W HCPCS: Performed by: NURSE PRACTITIONER

## 2022-05-11 PROCEDURE — 99283 EMERGENCY DEPT VISIT LOW MDM: CPT | Mod: ,,, | Performed by: NURSE PRACTITIONER

## 2022-05-11 PROCEDURE — 87428 SARSCOV & INF VIR A&B AG IA: CPT | Performed by: NURSE PRACTITIONER

## 2022-05-11 PROCEDURE — 36415 COLL VENOUS BLD VENIPUNCTURE: CPT | Performed by: NURSE PRACTITIONER

## 2022-05-11 PROCEDURE — 25000003 PHARM REV CODE 250: Performed by: NURSE PRACTITIONER

## 2022-05-11 PROCEDURE — 85025 COMPLETE CBC W/AUTO DIFF WBC: CPT | Performed by: NURSE PRACTITIONER

## 2022-05-11 PROCEDURE — 99283 PR EMERGENCY DEPT VISIT,LEVEL III: ICD-10-PCS | Mod: ,,, | Performed by: NURSE PRACTITIONER

## 2022-05-11 PROCEDURE — 82150 ASSAY OF AMYLASE: CPT | Performed by: NURSE PRACTITIONER

## 2022-05-11 PROCEDURE — 99285 EMERGENCY DEPT VISIT HI MDM: CPT | Mod: 25 | Performed by: NURSE PRACTITIONER

## 2022-05-11 PROCEDURE — 80053 COMPREHEN METABOLIC PANEL: CPT | Performed by: NURSE PRACTITIONER

## 2022-05-11 PROCEDURE — 83690 ASSAY OF LIPASE: CPT | Performed by: NURSE PRACTITIONER

## 2022-05-11 PROCEDURE — 99223 1ST HOSP IP/OBS HIGH 75: CPT | Mod: ,,, | Performed by: INTERNAL MEDICINE

## 2022-05-11 PROCEDURE — 11000001 HC ACUTE MED/SURG PRIVATE ROOM

## 2022-05-11 PROCEDURE — 99223 PR INITIAL HOSPITAL CARE,LEVL III: ICD-10-PCS | Mod: ,,, | Performed by: INTERNAL MEDICINE

## 2022-05-11 PROCEDURE — 96365 THER/PROPH/DIAG IV INF INIT: CPT | Performed by: NURSE PRACTITIONER

## 2022-05-11 RX ORDER — POTASSIUM CHLORIDE 7.45 MG/ML
10 INJECTION INTRAVENOUS
Status: COMPLETED | OUTPATIENT
Start: 2022-05-11 | End: 2022-05-11

## 2022-05-11 RX ORDER — NALOXONE HCL 0.4 MG/ML
0.02 VIAL (ML) INJECTION
Status: DISCONTINUED | OUTPATIENT
Start: 2022-05-12 | End: 2022-05-15 | Stop reason: HOSPADM

## 2022-05-11 RX ORDER — ACETAMINOPHEN 500 MG
1000 TABLET ORAL
Status: COMPLETED | OUTPATIENT
Start: 2022-05-11 | End: 2022-05-11

## 2022-05-11 RX ORDER — SODIUM CHLORIDE 9 MG/ML
1000 INJECTION, SOLUTION INTRAVENOUS
Status: COMPLETED | OUTPATIENT
Start: 2022-05-11 | End: 2022-05-11

## 2022-05-11 RX ORDER — ONDANSETRON 4 MG/1
8 TABLET, ORALLY DISINTEGRATING ORAL EVERY 8 HOURS PRN
Status: DISCONTINUED | OUTPATIENT
Start: 2022-05-12 | End: 2022-05-15 | Stop reason: HOSPADM

## 2022-05-11 RX ORDER — SODIUM CHLORIDE 0.9 % (FLUSH) 0.9 %
10 SYRINGE (ML) INJECTION EVERY 12 HOURS PRN
Status: DISCONTINUED | OUTPATIENT
Start: 2022-05-12 | End: 2022-05-15 | Stop reason: HOSPADM

## 2022-05-11 RX ORDER — PROCHLORPERAZINE EDISYLATE 5 MG/ML
5 INJECTION INTRAMUSCULAR; INTRAVENOUS EVERY 6 HOURS PRN
Status: DISCONTINUED | OUTPATIENT
Start: 2022-05-12 | End: 2022-05-15 | Stop reason: HOSPADM

## 2022-05-11 RX ORDER — POTASSIUM CHLORIDE 7.45 MG/ML
40 INJECTION INTRAVENOUS ONCE
Status: COMPLETED | OUTPATIENT
Start: 2022-05-12 | End: 2022-05-12

## 2022-05-11 RX ORDER — SODIUM CHLORIDE, SODIUM LACTATE, POTASSIUM CHLORIDE, CALCIUM CHLORIDE 600; 310; 30; 20 MG/100ML; MG/100ML; MG/100ML; MG/100ML
INJECTION, SOLUTION INTRAVENOUS CONTINUOUS
Status: DISCONTINUED | OUTPATIENT
Start: 2022-05-12 | End: 2022-05-15

## 2022-05-11 RX ORDER — ENOXAPARIN SODIUM 100 MG/ML
40 INJECTION SUBCUTANEOUS EVERY 24 HOURS
Status: DISCONTINUED | OUTPATIENT
Start: 2022-05-12 | End: 2022-05-15 | Stop reason: HOSPADM

## 2022-05-11 RX ORDER — LABETALOL HYDROCHLORIDE 5 MG/ML
10 INJECTION, SOLUTION INTRAVENOUS EVERY 4 HOURS PRN
Status: DISCONTINUED | OUTPATIENT
Start: 2022-05-12 | End: 2022-05-15 | Stop reason: HOSPADM

## 2022-05-11 RX ADMIN — SODIUM CHLORIDE 1000 ML: 9 INJECTION, SOLUTION INTRAVENOUS at 08:05

## 2022-05-11 RX ADMIN — PROMETHAZINE HYDROCHLORIDE 25 MG: 25 INJECTION INTRAMUSCULAR; INTRAVENOUS at 08:05

## 2022-05-11 RX ADMIN — POTASSIUM CHLORIDE 10 MEQ: 7.46 INJECTION, SOLUTION INTRAVENOUS at 09:05

## 2022-05-11 RX ADMIN — ACETAMINOPHEN 1000 MG: 500 TABLET ORAL at 08:05

## 2022-05-12 LAB
ALBUMIN SERPL BCP-MCNC: 3.2 G/DL (ref 3.5–5)
ALBUMIN/GLOB SERPL: 0.8 {RATIO}
ALP SERPL-CCNC: 72 U/L (ref 39–100)
ALT SERPL W P-5'-P-CCNC: 32 U/L (ref 13–56)
ANION GAP SERPL CALCULATED.3IONS-SCNC: 21 MMOL/L (ref 7–16)
AST SERPL W P-5'-P-CCNC: 37 U/L (ref 15–37)
BASOPHILS # BLD AUTO: 0.05 K/UL (ref 0–0.2)
BASOPHILS NFR BLD AUTO: 0.7 % (ref 0–1)
BILIRUB SERPL-MCNC: 0.8 MG/DL (ref 0–1.2)
BUN SERPL-MCNC: 7 MG/DL (ref 7–18)
BUN/CREAT SERPL: 13 (ref 6–20)
CALCIUM SERPL-MCNC: 8.8 MG/DL (ref 8.5–10.1)
CHLORIDE SERPL-SCNC: 103 MMOL/L (ref 98–107)
CO2 SERPL-SCNC: 19 MMOL/L (ref 21–32)
CREAT SERPL-MCNC: 0.56 MG/DL (ref 0.55–1.02)
DIFFERENTIAL METHOD BLD: ABNORMAL
EOSINOPHIL # BLD AUTO: 0.02 K/UL (ref 0–0.5)
EOSINOPHIL NFR BLD AUTO: 0.3 % (ref 1–4)
EOSINOPHIL NFR BLD MANUAL: 1 % (ref 1–4)
ERYTHROCYTE [DISTWIDTH] IN BLOOD BY AUTOMATED COUNT: 13 % (ref 11.5–14.5)
GLOBULIN SER-MCNC: 3.9 G/DL (ref 2–4)
GLUCOSE SERPL-MCNC: 84 MG/DL (ref 74–106)
HCG SERUM QUALITATIVE: NEGATIVE
HCT VFR BLD AUTO: 39.9 % (ref 38–47)
HGB BLD-MCNC: 13.2 G/DL (ref 12–16)
IMM GRANULOCYTES # BLD AUTO: 0.03 K/UL (ref 0–0.04)
IMM GRANULOCYTES NFR BLD: 0.4 % (ref 0–0.4)
LYMPHOCYTES # BLD AUTO: 1.4 K/UL (ref 1–4.8)
LYMPHOCYTES NFR BLD AUTO: 18.6 % (ref 27–41)
LYMPHOCYTES NFR BLD MANUAL: 22 % (ref 27–41)
MCH RBC QN AUTO: 28.2 PG (ref 27–31)
MCHC RBC AUTO-ENTMCNC: 33.1 G/DL (ref 32–36)
MCV RBC AUTO: 85.3 FL (ref 80–96)
MONOCYTES # BLD AUTO: 1.13 K/UL (ref 0–0.8)
MONOCYTES NFR BLD AUTO: 15 % (ref 2–6)
MONOCYTES NFR BLD MANUAL: 18 % (ref 2–6)
MPC BLD CALC-MCNC: 11.8 FL (ref 9.4–12.4)
NEUTROPHILS # BLD AUTO: 4.9 K/UL (ref 1.8–7.7)
NEUTROPHILS NFR BLD AUTO: 65 % (ref 53–65)
NEUTS BAND NFR BLD MANUAL: 3 % (ref 1–5)
NEUTS SEG NFR BLD MANUAL: 56 % (ref 50–62)
NRBC # BLD AUTO: 0 X10E3/UL
NRBC, AUTO (.00): 0 %
PLATELET # BLD AUTO: 162 K/UL (ref 150–400)
PLATELET MORPHOLOGY: ABNORMAL
POTASSIUM SERPL-SCNC: 3.5 MMOL/L (ref 3.5–5.1)
PROT SERPL-MCNC: 7.1 G/DL (ref 6.4–8.2)
RBC # BLD AUTO: 4.68 M/UL (ref 4.2–5.4)
RBC MORPH BLD: NORMAL
SODIUM SERPL-SCNC: 139 MMOL/L (ref 136–145)
WBC # BLD AUTO: 7.53 K/UL (ref 4.5–11)

## 2022-05-12 PROCEDURE — 80053 COMPREHEN METABOLIC PANEL: CPT | Performed by: INTERNAL MEDICINE

## 2022-05-12 PROCEDURE — C9113 INJ PANTOPRAZOLE SODIUM, VIA: HCPCS | Performed by: HOSPITALIST

## 2022-05-12 PROCEDURE — 85025 COMPLETE CBC W/AUTO DIFF WBC: CPT | Performed by: INTERNAL MEDICINE

## 2022-05-12 PROCEDURE — 99233 PR SUBSEQUENT HOSPITAL CARE,LEVL III: ICD-10-PCS | Mod: ,,, | Performed by: HOSPITALIST

## 2022-05-12 PROCEDURE — 87149 DNA/RNA DIRECT PROBE: CPT | Performed by: INTERNAL MEDICINE

## 2022-05-12 PROCEDURE — 36415 COLL VENOUS BLD VENIPUNCTURE: CPT | Performed by: INTERNAL MEDICINE

## 2022-05-12 PROCEDURE — 84703 CHORIONIC GONADOTROPIN ASSAY: CPT | Performed by: HOSPITALIST

## 2022-05-12 PROCEDURE — 63600175 PHARM REV CODE 636 W HCPCS: Performed by: INTERNAL MEDICINE

## 2022-05-12 PROCEDURE — 25000003 PHARM REV CODE 250: Performed by: INTERNAL MEDICINE

## 2022-05-12 PROCEDURE — 87040 BLOOD CULTURE FOR BACTERIA: CPT | Performed by: INTERNAL MEDICINE

## 2022-05-12 PROCEDURE — 63600175 PHARM REV CODE 636 W HCPCS: Performed by: HOSPITALIST

## 2022-05-12 PROCEDURE — 11000001 HC ACUTE MED/SURG PRIVATE ROOM

## 2022-05-12 PROCEDURE — 87040 BLOOD CULTURE FOR BACTERIA: CPT | Performed by: HOSPITALIST

## 2022-05-12 PROCEDURE — 99233 SBSQ HOSP IP/OBS HIGH 50: CPT | Mod: ,,, | Performed by: HOSPITALIST

## 2022-05-12 PROCEDURE — 25000003 PHARM REV CODE 250: Performed by: HOSPITALIST

## 2022-05-12 RX ORDER — MORPHINE SULFATE 4 MG/ML
3 INJECTION, SOLUTION INTRAMUSCULAR; INTRAVENOUS EVERY 4 HOURS PRN
Status: DISCONTINUED | OUTPATIENT
Start: 2022-05-13 | End: 2022-05-15 | Stop reason: HOSPADM

## 2022-05-12 RX ORDER — ACETAMINOPHEN 325 MG/1
650 TABLET ORAL EVERY 6 HOURS PRN
Status: DISCONTINUED | OUTPATIENT
Start: 2022-05-13 | End: 2022-05-15 | Stop reason: HOSPADM

## 2022-05-12 RX ORDER — PANTOPRAZOLE SODIUM 40 MG/10ML
40 INJECTION, POWDER, LYOPHILIZED, FOR SOLUTION INTRAVENOUS 2 TIMES DAILY
Status: DISCONTINUED | OUTPATIENT
Start: 2022-05-12 | End: 2022-05-15 | Stop reason: HOSPADM

## 2022-05-12 RX ORDER — POTASSIUM CHLORIDE 20 MEQ/1
20 TABLET, EXTENDED RELEASE ORAL 2 TIMES DAILY
Status: DISPENSED | OUTPATIENT
Start: 2022-05-12 | End: 2022-05-14

## 2022-05-12 RX ADMIN — POTASSIUM CHLORIDE 40 MEQ: 7.46 INJECTION, SOLUTION INTRAVENOUS at 01:05

## 2022-05-12 RX ADMIN — PANTOPRAZOLE SODIUM 40 MG: 40 INJECTION, POWDER, FOR SOLUTION INTRAVENOUS at 09:05

## 2022-05-12 RX ADMIN — POTASSIUM CHLORIDE 20 MEQ: 1500 TABLET, EXTENDED RELEASE ORAL at 09:05

## 2022-05-12 RX ADMIN — SODIUM CHLORIDE, POTASSIUM CHLORIDE, SODIUM LACTATE AND CALCIUM CHLORIDE: 600; 310; 30; 20 INJECTION, SOLUTION INTRAVENOUS at 12:05

## 2022-05-12 RX ADMIN — CEFTRIAXONE SODIUM 1 G: 1 INJECTION, POWDER, FOR SOLUTION INTRAMUSCULAR; INTRAVENOUS at 12:05

## 2022-05-12 NOTE — ED TRIAGE NOTES
Patient reports increased abdominal pain since her visit yesterday; has lower pelvic pain. Patient states she has been taking her medicine she was prescribed last night.

## 2022-05-12 NOTE — PLAN OF CARE
[facility] - Orthopedic  Initial Discharge Assessment       Primary Care Provider: Primary Doctor No    Admission Diagnosis: Hypokalemia [E87.6]  Pyelonephritis [N12]  Intractable vomiting with nausea, unspecified vomiting type [R11.2]    Admission Date: 5/11/2022  Expected Discharge Date:     Discharge Barriers Identified: [employer]    Payor: /     Extended Emergency Contact Information  Primary Emergency Contact: Kim Velez  Mobile Phone: [phone]  Relation: Sister  Preferred language: English   needed? No    Discharge Plan A: Home with family  Discharge Plan B: Home    No Pharmacies Listed    Initial Assessment (most recent)     Adult Discharge Assessment - 05/12/22 1553        Discharge Assessment    Assessment Type Discharge Planning Assessment     Source of Information patient     Lives With child(samantha), dependent     Do you expect to return to your current living situation? Yes     Do you have help at home or someone to help you manage your care at home? No     Prior to hospitilization cognitive status: Alert/Oriented     Current cognitive status: Alert/Oriented     Equipment Currently Used at Home none     Patient currently being followed by outpatient case management? No     Do you currently have service(s) that help you manage your care at home? No     Do you have prescription coverage? No     How do you get to doctors appointments? car, drives self     Are you on dialysis? No     Do you take coumadin? No     Discharge Plan A Home with family     Discharge Plan B Home     DME Needed Upon Discharge  none     Discharge Plan discussed with: Patient     Discharge Barriers Identified Unis[employer]               Pt lives at home with children, not currently using any dme and has no payor source. Pt does not have a PCP and has already spoken with Fanny in [employer] and filled out paperwork. Pt plans to dc back home when medically ready. SW will cont to follow for dc needs.

## 2022-05-12 NOTE — H&P
[facility]  History & Physical    Patient Name: Dai Velez  MRN: [id number]  Patient Class: IP- Inpatient  Admission Date: 5/11/2022  Attending Physician: AMBER Carrillo MD  Primary Care Provider: Primary Doctor No         Patient information was obtained from patient and ER records.     Subjective:     Principal Problem:Sepsis due to gram-negative UTI    Chief Complaint:   Chief Complaint   Patient presents with    Abdominal Pain        HPI: Patient is a 46-year-old female with a history of essential hypertension but otherwise seemingly healthy with no other known chronic medical issues who was seen in ED few days ago for evaluation of nausea vomiting and diffuse lower abdominal/suprapubic pain.  Patient was subsequently discharged home with a prescription for Bactrim but nausea and vomiting persisted and patient was not able to keep any medications or any food down prompting ED visit.  On presentation, patient was hypertensive, and tachypneic, but otherwise the rest of vitals were within normal limits.  Physical examination was notable for presence of suprapubic pain and ensuing workup was notable for presence of hypokalemia, and UA which was highly suggestive of a presence of urinary tract infection.  Patient will be admitted for further evaluation and intervention      Past Medical History:   Diagnosis Date    HTN (hypertension)        History reviewed. No pertinent surgical history.    Review of patient's allergies indicates:  No Known Allergies    No current facility-administered medications on file prior to encounter.     Current Outpatient Medications on File Prior to Encounter   Medication Sig    ondansetron (ZOFRAN) 4 MG tablet Take 1 tablet (4 mg total) by mouth every 6 (six) hours.    pantoprazole (PROTONIX) 20 MG tablet Take 1 tablet (20 mg total) by mouth once daily.    promethazine (PHENERGAN) 25 MG suppository Place 1 suppository (25 mg total) rectally every 6  (six) hours as needed for Nausea. Use if unable to tolerate oral Phenergan.    promethazine (PHENERGAN) 25 MG tablet Take 1 tablet (25 mg total) by mouth every 6 (six) hours as needed for Nausea.    promethazine (PHENERGAN) 25 MG tablet Take 1 tablet (25 mg total) by mouth every 6 (six) hours as needed for Nausea.    sulfamethoxazole-trimethoprim 800-160mg (BACTRIM DS) 800-160 mg Tab Take 1 tablet by mouth 2 (two) times daily. for 10 days     Family History    None       Tobacco Use    Smoking status: Never Smoker    Smokeless tobacco: Never Used   Substance and Sexual Activity    Alcohol use: Not Currently    Drug use: Never    Sexual activity: Not on file     Review of Systems   Constitutional:  Negative for chills and fever.   HENT:  Negative for postnasal drip and rhinorrhea.    Eyes:  Negative for itching.   Respiratory:  Negative for shortness of breath.    Cardiovascular:  Negative for chest pain, palpitations and leg swelling.   Gastrointestinal:  Positive for abdominal pain, nausea and vomiting. Negative for abdominal distention and diarrhea.   Endocrine: Negative for cold intolerance, heat intolerance, polydipsia, polyphagia and polyuria.   Genitourinary:  Negative for dysuria and hematuria.   Musculoskeletal:  Negative for arthralgias, joint swelling, myalgias, neck pain and neck stiffness.   Skin:  Negative for pallor and rash.   Allergic/Immunologic: Negative for environmental allergies, food allergies and immunocompromised state.   Neurological:  Negative for dizziness, seizures, facial asymmetry, light-headedness and numbness.   Objective:     Vital Signs (Most Recent):  Temp: 99.7 °F (37.6 °C) (05/11/22 2216)  Pulse: 85 (05/11/22 1935)  Resp: (!) 22 (05/11/22 1935)  BP: (!) 164/93 (05/11/22 2245)  SpO2: 97 % (05/11/22 1935)   Vital Signs (24h Range):  Temp:  [99.7 °F (37.6 °C)-102 °F (38.9 °C)] 99.7 °F (37.6 °C)  Pulse:  [85-92] 85  Resp:  [18-22] 22  SpO2:  [97 %-100 %] 97 %  BP:  (164-174)/() 164/93     Weight: 93.7 kg (206 lb 9.6 oz)  Body mass index is 33.35 kg/m².    Physical Exam  Vitals reviewed.   Constitutional:       General: She is not in acute distress.     Appearance: Normal appearance.   HENT:      Head: Normocephalic and atraumatic.      Right Ear: External ear normal.      Left Ear: External ear normal.   Eyes:      Extraocular Movements: Extraocular movements intact.      Pupils: Pupils are equal, round, and reactive to light.   Cardiovascular:      Rate and Rhythm: Normal rate and regular rhythm.      Pulses: Normal pulses.      Heart sounds: Normal heart sounds. No murmur heard.  Pulmonary:      Effort: Pulmonary effort is normal. No respiratory distress.      Breath sounds: Normal breath sounds. No wheezing.   Chest:      Chest wall: No tenderness.   Abdominal:      General: Abdomen is flat.      Palpations: Abdomen is soft. There is no mass.      Tenderness: There is no abdominal tenderness. There is no right CVA tenderness or left CVA tenderness.   Musculoskeletal:         General: No swelling or tenderness. Normal range of motion.   Skin:     General: Skin is warm and dry.      Capillary Refill: Capillary refill takes less than 2 seconds.   Neurological:      General: No focal deficit present.      Mental Status: She is alert and oriented to person, place, and time. Mental status is at baseline.   Psychiatric:         Mood and Affect: Mood normal.         Thought Content: Thought content normal.     Cranial nerves:  Cranial nerves 2-12 were grossly intact     Significant Labs: All pertinent labs within the past 24 hours have been reviewed.      Assessment/Plan:     * Sepsis due to gram-negative UTI  -Patient met the sepsis criteria by virtue of being febrile and tachypneic with source being urinary tract infection  -In the past patient grew E coli which was basically pansensitive  -Will empirically start patient on Rocephin  -Panculture      Intractable  vomiting  -Likely due to urinary tract infection  -Keep patient NPO and start IV fluid      Hypokalemia  -Likely due to intractable nausea and vomiting  -Will replace potassium on p.r.n. basis        DVT prophylaxis:  Lovenox 40 mg subQ Q 24 hours       Wilfredo Ribeiro MD  Department of Hospital Medicine   [facility] - Orthopedic

## 2022-05-12 NOTE — HPI
Patient is a 46-year-old female with a history of essential hypertension but otherwise seemingly healthy with no other known chronic medical issues who was seen in ED few days ago for evaluation of nausea vomiting and diffuse lower abdominal/suprapubic pain.  Patient was subsequently discharged home with a prescription for Bactrim but nausea and vomiting persisted and patient was not able to keep any medications or any food down prompting ED visit.  On presentation, patient was hypertensive, and tachypneic, but otherwise the rest of vitals were within normal limits.  Physical examination was notable for presence of suprapubic pain and ensuing workup was notable for presence of hypokalemia, and UA which was highly suggestive of a presence of urinary tract infection.  Patient will be admitted for further evaluation and intervention

## 2022-05-12 NOTE — ASSESSMENT & PLAN NOTE
-Patient met the sepsis criteria by virtue of being febrile and tachypneic with source being urinary tract infection  -In the past patient grew E coli which was basically pansensitive  -Will empirically start patient on Rocephin  -Panculture

## 2022-05-12 NOTE — ED PROVIDER NOTES
Encounter Date: 5/11/2022       History     Chief Complaint   Patient presents with    Abdominal Pain     46 year old AAF presents to ED with complaint of abdominal pain, nausea, vomiting. She states she has been sick going on 3 days, with inability to keep down food or water. Workup on yesterday noted UTI. She was discharged on PO antibiotics and FL nausea medications. She states she has vomited throughout the day today and was not able to retain PO antibiotics.     The history is provided by the patient. No  was used.   Abdominal Pain  The current episode started several days ago. The problem has been gradually worsening. The abdominal pain is located in the periumbilical region. The abdominal pain radiates to the suprapubic region. The severity of the abdominal pain is 9/10. The abdominal pain is relieved by nothing. The abdominal pain is exacerbated by vomiting and movement. The other symptoms of the illness include fever, nausea and vomiting. The other symptoms of the illness do not include shortness of breath.   Nausea began 3 to 5 days ago.   The vomiting began more than 2 days ago. Vomiting occurs 6 to 10 times per day. The emesis contains stomach contents.   The illness is associated with a recent illness. The patient states that she believes she is currently not pregnant. The patient has not had a change in bowel habit. Additional symptoms associated with the illness include chills. Symptoms associated with the illness do not include urgency.     Review of patient's allergies indicates:  No Known Allergies  Past Medical History:   Diagnosis Date    HTN (hypertension)      No past surgical history on file.  No family history on file.  Social History     Tobacco Use    Smoking status: Never Smoker    Smokeless tobacco: Never Used   Substance Use Topics    Alcohol use: Not Currently    Drug use: Never     Review of Systems   Constitutional: Positive for chills and fever.    Respiratory: Negative for cough and shortness of breath.    Gastrointestinal: Positive for abdominal pain, nausea and vomiting.   Genitourinary: Negative for difficulty urinating and urgency.   Musculoskeletal: Negative for arthralgias and gait problem.   Skin: Negative for color change and wound.   Allergic/Immunologic: Negative for environmental allergies and food allergies.   Neurological: Positive for weakness. Negative for dizziness.   Hematological: Negative for adenopathy. Does not bruise/bleed easily.   Psychiatric/Behavioral: Negative for agitation and confusion.       Physical Exam     Initial Vitals [05/11/22 1902]   BP Pulse Resp Temp SpO2   (!) 165/106 92 18 (!) 100.6 °F (38.1 °C) 100 %      MAP       --         Physical Exam    Nursing note and vitals reviewed.  Constitutional: She appears well-developed and well-nourished.   HENT:   Head: Normocephalic and atraumatic.   Mouth/Throat: Mucous membranes are dry.   Eyes: EOM are normal. Pupils are equal, round, and reactive to light.   Neck: Neck supple.   Normal range of motion.  Cardiovascular: Normal rate and regular rhythm.   Pulmonary/Chest: Breath sounds normal. She has no wheezes. She has no rales.   Abdominal: Abdomen is soft. She exhibits no distension. There is abdominal tenderness in the suprapubic area.       Musculoskeletal:      Cervical back: Normal range of motion and neck supple.     Neurological: She is oriented to person, place, and time.   Skin: Skin is warm and dry. Capillary refill takes 2 to 3 seconds.   Psychiatric: She has a normal mood and affect. Thought content normal.         Medical Screening Exam   See Full Note    ED Course   Procedures  Labs Reviewed   COMPREHENSIVE METABOLIC PANEL - Abnormal; Notable for the following components:       Result Value    Potassium 2.9 (*)     Chloride 108 (*)     CO2 20 (*)     BUN 5 (*)     Albumin 3.2 (*)     Globulin 4.1 (*)     All other components within normal limits   CBC WITH  DIFFERENTIAL - Abnormal; Notable for the following components:    MPV 13.1 (*)     Neutrophils % 67.5 (*)     Lymphocytes % 17.1 (*)     Monocytes % 14.7 (*)     Eosinophils % 0.0 (*)     Monocytes, Absolute 1.09 (*)     All other components within normal limits   CBC MORPHOLOGY - Abnormal; Notable for the following components:    Platelet Morphology Few Large Platelets (*)     All other components within normal limits   SARS-COV2 (COVID) W/ FLU ANTIGEN - Normal    Narrative:     Negative SARS-CoV results should not be used as the sole basis for treatment or patient management decisions; negative results should be considered in the context of a patient's recent exposures, history and the presene of clinical signs and symptoms consistent with COVID-19.  Negative results should be treated as presumptive and confirmed by molecular assay, if necessary for patient management.   AMYLASE - Normal   LIPASE - Normal   CBC W/ AUTO DIFFERENTIAL    Narrative:     The following orders were created for panel order CBC auto differential.  Procedure                               Abnormality         Status                     ---------                               -----------         ------                     CBC with Differential[[id number]]        Abnormal            Final result                 Please view results for these tests on the individual orders.          Imaging Results          XR ABDOMEN, ACUTE 2 OR MORE VIEWS WITH CHEST (In process)                  Medications   0.9%  NaCl infusion (1,000 mLs Intravenous New Bag 5/11/22 2059)   promethazine (PHENERGAN) 25 mg in dextrose 5 % 50 mL IVPB (0 mg Intravenous Stopped 5/11/22 2119)   acetaminophen tablet 1,000 mg (1,000 mg Oral Given 5/11/22 2059)   potassium chloride 10 mEq in 100 mL IVPB (10 mEq Intravenous New Bag 5/11/22 2144)                 ED Course as of 05/11/22 2148   Wed May 11, 2022   2135 SARS-CoV2 (COVID) with FLU Antigen [BB]      ED Course User Index  [BB]  Deion Stewart MD          Clinical Impression:   Final diagnoses:  [N12] Pyelonephritis (Primary)  [E87.6] Hypokalemia  [R11.2] Intractable vomiting with nausea, unspecified vomiting type          ED Disposition Condition    Observation               Shana Farfan, [facility]  05/11/22 6790

## 2022-05-12 NOTE — SUBJECTIVE & OBJECTIVE
Past Medical History:   Diagnosis Date    HTN (hypertension)        History reviewed. No pertinent surgical history.    Review of patient's allergies indicates:  No Known Allergies    No current facility-administered medications on file prior to encounter.     Current Outpatient Medications on File Prior to Encounter   Medication Sig    ondansetron (ZOFRAN) 4 MG tablet Take 1 tablet (4 mg total) by mouth every 6 (six) hours.    pantoprazole (PROTONIX) 20 MG tablet Take 1 tablet (20 mg total) by mouth once daily.    promethazine (PHENERGAN) 25 MG suppository Place 1 suppository (25 mg total) rectally every 6 (six) hours as needed for Nausea. Use if unable to tolerate oral Phenergan.    promethazine (PHENERGAN) 25 MG tablet Take 1 tablet (25 mg total) by mouth every 6 (six) hours as needed for Nausea.    promethazine (PHENERGAN) 25 MG tablet Take 1 tablet (25 mg total) by mouth every 6 (six) hours as needed for Nausea.    sulfamethoxazole-trimethoprim 800-160mg (BACTRIM DS) 800-160 mg Tab Take 1 tablet by mouth 2 (two) times daily. for 10 days     Family History    None       Tobacco Use    Smoking status: Never Smoker    Smokeless tobacco: Never Used   Substance and Sexual Activity    Alcohol use: Not Currently    Drug use: Never    Sexual activity: Not on file     Review of Systems   Constitutional:  Negative for chills and fever.   HENT:  Negative for postnasal drip and rhinorrhea.    Eyes:  Negative for itching.   Respiratory:  Negative for shortness of breath.    Cardiovascular:  Negative for chest pain, palpitations and leg swelling.   Gastrointestinal:  Positive for abdominal pain, nausea and vomiting. Negative for abdominal distention and diarrhea.   Endocrine: Negative for cold intolerance, heat intolerance, polydipsia, polyphagia and polyuria.   Genitourinary:  Negative for dysuria and hematuria.   Musculoskeletal:  Negative for arthralgias, joint swelling, myalgias, neck pain and neck stiffness.   Skin:   Negative for pallor and rash.   Allergic/Immunologic: Negative for environmental allergies, food allergies and immunocompromised state.   Neurological:  Negative for dizziness, seizures, facial asymmetry, light-headedness and numbness.   Objective:     Vital Signs (Most Recent):  Temp: 99.7 °F (37.6 °C) (05/11/22 2216)  Pulse: 85 (05/11/22 1935)  Resp: (!) 22 (05/11/22 1935)  BP: (!) 164/93 (05/11/22 2245)  SpO2: 97 % (05/11/22 1935)   Vital Signs (24h Range):  Temp:  [99.7 °F (37.6 °C)-102 °F (38.9 °C)] 99.7 °F (37.6 °C)  Pulse:  [85-92] 85  Resp:  [18-22] 22  SpO2:  [97 %-100 %] 97 %  BP: (164-174)/() 164/93     Weight: 93.7 kg (206 lb 9.6 oz)  Body mass index is 33.35 kg/m².    Physical Exam  Vitals reviewed.   Constitutional:       General: She is not in acute distress.     Appearance: Normal appearance.   HENT:      Head: Normocephalic and atraumatic.      Right Ear: External ear normal.      Left Ear: External ear normal.   Eyes:      Extraocular Movements: Extraocular movements intact.      Pupils: Pupils are equal, round, and reactive to light.   Cardiovascular:      Rate and Rhythm: Normal rate and regular rhythm.      Pulses: Normal pulses.      Heart sounds: Normal heart sounds. No murmur heard.  Pulmonary:      Effort: Pulmonary effort is normal. No respiratory distress.      Breath sounds: Normal breath sounds. No wheezing.   Chest:      Chest wall: No tenderness.   Abdominal:      General: Abdomen is flat.      Palpations: Abdomen is soft. There is no mass.      Tenderness: There is no abdominal tenderness. There is no right CVA tenderness or left CVA tenderness.   Musculoskeletal:         General: No swelling or tenderness. Normal range of motion.   Skin:     General: Skin is warm and dry.      Capillary Refill: Capillary refill takes less than 2 seconds.   Neurological:      General: No focal deficit present.      Mental Status: She is alert and oriented to person, place, and time. Mental  status is at baseline.   Psychiatric:         Mood and Affect: Mood normal.         Thought Content: Thought content normal.     Cranial nerves:  Cranial nerves 2-12 were grossly intact     Significant Labs: All pertinent labs within the past 24 hours have been reviewed.

## 2022-05-12 NOTE — PLAN OF CARE
Problem: Adult Inpatient Plan of Care  Goal: Plan of Care Review  Outcome: Ongoing, Progressing  Goal: Absence of Hospital-Acquired Illness or Injury  Outcome: Ongoing, Progressing  Intervention: Identify and Manage Fall Risk  Flowsheets (Taken 5/12/2022 0322)  Safety Promotion/Fall Prevention:   assistive device/personal item within reach   nonskid shoes/socks when out of bed  Intervention: Prevent Skin Injury  Flowsheets (Taken 5/12/2022 0322)  Body Position:   left   position changed independently  Intervention: Prevent Infection  Flowsheets (Taken 5/12/2022 0322)  Infection Prevention:   hand hygiene promoted   personal protective equipment utilized   single patient room provided

## 2022-05-13 LAB
AMYLASE SERPL-CCNC: 58 U/L (ref 25–115)
ANION GAP SERPL CALCULATED.3IONS-SCNC: 13 MMOL/L (ref 7–16)
BASOPHILS # BLD AUTO: 0.04 K/UL (ref 0–0.2)
BASOPHILS NFR BLD AUTO: 0.7 % (ref 0–1)
BUN SERPL-MCNC: 7 MG/DL (ref 7–18)
BUN/CREAT SERPL: 11 (ref 6–20)
CALCIUM SERPL-MCNC: 8.4 MG/DL (ref 8.5–10.1)
CHLORIDE SERPL-SCNC: 107 MMOL/L (ref 98–107)
CO2 SERPL-SCNC: 22 MMOL/L (ref 21–32)
CREAT SERPL-MCNC: 0.66 MG/DL (ref 0.55–1.02)
CRP SERPL-MCNC: 11.2 MG/DL (ref 0–0.8)
DIFFERENTIAL METHOD BLD: ABNORMAL
EOSINOPHIL # BLD AUTO: 0.06 K/UL (ref 0–0.5)
EOSINOPHIL NFR BLD AUTO: 1 % (ref 1–4)
ERYTHROCYTE [DISTWIDTH] IN BLOOD BY AUTOMATED COUNT: 13.1 % (ref 11.5–14.5)
GLUCOSE SERPL-MCNC: 91 MG/DL (ref 74–106)
HCT VFR BLD AUTO: 40.6 % (ref 38–47)
HGB BLD-MCNC: 12.9 G/DL (ref 12–16)
IMM GRANULOCYTES # BLD AUTO: 0.02 K/UL (ref 0–0.04)
IMM GRANULOCYTES NFR BLD: 0.3 % (ref 0–0.4)
LIPASE SERPL-CCNC: 138 U/L (ref 73–393)
LYMPHOCYTES # BLD AUTO: 1.55 K/UL (ref 1–4.8)
LYMPHOCYTES NFR BLD AUTO: 26.8 % (ref 27–41)
MCH RBC QN AUTO: 28.1 PG (ref 27–31)
MCHC RBC AUTO-ENTMCNC: 31.8 G/DL (ref 32–36)
MCV RBC AUTO: 88.5 FL (ref 80–96)
MONOCYTES # BLD AUTO: 1.13 K/UL (ref 0–0.8)
MONOCYTES NFR BLD AUTO: 19.6 % (ref 2–6)
MPC BLD CALC-MCNC: 13.4 FL (ref 9.4–12.4)
NEUTROPHILS # BLD AUTO: 2.98 K/UL (ref 1.8–7.7)
NEUTROPHILS NFR BLD AUTO: 51.6 % (ref 53–65)
NRBC # BLD AUTO: 0 X10E3/UL
NRBC, AUTO (.00): 0 %
PLATELET # BLD AUTO: 176 K/UL (ref 150–400)
PLATELET MORPHOLOGY: ABNORMAL
POTASSIUM SERPL-SCNC: 3.5 MMOL/L (ref 3.5–5.1)
RBC # BLD AUTO: 4.59 M/UL (ref 4.2–5.4)
RBC MORPH BLD: NORMAL
SODIUM SERPL-SCNC: 138 MMOL/L (ref 136–145)
T4 SERPL-MCNC: 11.9 ΜG/DL (ref 4.8–13.9)
TSH SERPL DL<=0.005 MIU/L-ACNC: 1.61 UIU/ML (ref 0.36–3.74)
VERIGENE RESULT: ABNORMAL
WBC # BLD AUTO: 5.78 K/UL (ref 4.5–11)

## 2022-05-13 PROCEDURE — 80048 BASIC METABOLIC PNL TOTAL CA: CPT | Performed by: HOSPITALIST

## 2022-05-13 PROCEDURE — C9113 INJ PANTOPRAZOLE SODIUM, VIA: HCPCS | Performed by: HOSPITALIST

## 2022-05-13 PROCEDURE — 63600175 PHARM REV CODE 636 W HCPCS: Performed by: HOSPITALIST

## 2022-05-13 PROCEDURE — 11000001 HC ACUTE MED/SURG PRIVATE ROOM

## 2022-05-13 PROCEDURE — 25000003 PHARM REV CODE 250: Performed by: HOSPITALIST

## 2022-05-13 PROCEDURE — 84436 ASSAY OF TOTAL THYROXINE: CPT | Performed by: HOSPITALIST

## 2022-05-13 PROCEDURE — 86140 C-REACTIVE PROTEIN: CPT | Performed by: HOSPITALIST

## 2022-05-13 PROCEDURE — 25000003 PHARM REV CODE 250: Performed by: INTERNAL MEDICINE

## 2022-05-13 PROCEDURE — 36415 COLL VENOUS BLD VENIPUNCTURE: CPT | Performed by: HOSPITALIST

## 2022-05-13 PROCEDURE — 63600175 PHARM REV CODE 636 W HCPCS: Performed by: INTERNAL MEDICINE

## 2022-05-13 PROCEDURE — 99233 PR SUBSEQUENT HOSPITAL CARE,LEVL III: ICD-10-PCS | Mod: ,,, | Performed by: HOSPITALIST

## 2022-05-13 PROCEDURE — 82150 ASSAY OF AMYLASE: CPT | Performed by: HOSPITALIST

## 2022-05-13 PROCEDURE — 99233 SBSQ HOSP IP/OBS HIGH 50: CPT | Mod: ,,, | Performed by: HOSPITALIST

## 2022-05-13 PROCEDURE — 83690 ASSAY OF LIPASE: CPT | Performed by: HOSPITALIST

## 2022-05-13 PROCEDURE — 85025 COMPLETE CBC W/AUTO DIFF WBC: CPT | Performed by: HOSPITALIST

## 2022-05-13 PROCEDURE — 25500020 PHARM REV CODE 255: Performed by: HOSPITALIST

## 2022-05-13 PROCEDURE — 84443 ASSAY THYROID STIM HORMONE: CPT | Performed by: HOSPITALIST

## 2022-05-13 RX ORDER — HYDROCODONE BITARTRATE AND ACETAMINOPHEN 7.5; 325 MG/1; MG/1
1 TABLET ORAL EVERY 6 HOURS PRN
Status: DISCONTINUED | OUTPATIENT
Start: 2022-05-14 | End: 2022-05-15 | Stop reason: HOSPADM

## 2022-05-13 RX ORDER — SYRING-NEEDL,DISP,INSUL,0.3 ML 29 G X1/2"
296 SYRINGE, EMPTY DISPOSABLE MISCELLANEOUS ONCE
Status: COMPLETED | OUTPATIENT
Start: 2022-05-13 | End: 2022-05-13

## 2022-05-13 RX ADMIN — POTASSIUM CHLORIDE 20 MEQ: 1500 TABLET, EXTENDED RELEASE ORAL at 09:05

## 2022-05-13 RX ADMIN — VANCOMYCIN HYDROCHLORIDE 2000 MG: 5 INJECTION, POWDER, LYOPHILIZED, FOR SOLUTION INTRAVENOUS at 12:05

## 2022-05-13 RX ADMIN — ONDANSETRON 8 MG: 4 TABLET, ORALLY DISINTEGRATING ORAL at 07:05

## 2022-05-13 RX ADMIN — VANCOMYCIN HYDROCHLORIDE 2000 MG: 5 INJECTION, POWDER, LYOPHILIZED, FOR SOLUTION INTRAVENOUS at 06:05

## 2022-05-13 RX ADMIN — PANTOPRAZOLE SODIUM 40 MG: 40 INJECTION, POWDER, FOR SOLUTION INTRAVENOUS at 09:05

## 2022-05-13 RX ADMIN — Medication 296 ML: at 05:05

## 2022-05-13 RX ADMIN — ACETAMINOPHEN 650 MG: 325 TABLET ORAL at 07:05

## 2022-05-13 RX ADMIN — LABETALOL HYDROCHLORIDE 10 MG: 5 INJECTION INTRAVENOUS at 11:05

## 2022-05-13 RX ADMIN — CEFTRIAXONE SODIUM 1 G: 1 INJECTION, POWDER, FOR SOLUTION INTRAMUSCULAR; INTRAVENOUS at 03:05

## 2022-05-13 RX ADMIN — ENOXAPARIN SODIUM 40 MG: 40 INJECTION SUBCUTANEOUS at 05:05

## 2022-05-13 RX ADMIN — SODIUM CHLORIDE, POTASSIUM CHLORIDE, SODIUM LACTATE AND CALCIUM CHLORIDE: 600; 310; 30; 20 INJECTION, SOLUTION INTRAVENOUS at 03:05

## 2022-05-13 RX ADMIN — IOPAMIDOL 100 ML: 755 INJECTION, SOLUTION INTRAVENOUS at 09:05

## 2022-05-13 RX ADMIN — SODIUM CHLORIDE, POTASSIUM CHLORIDE, SODIUM LACTATE AND CALCIUM CHLORIDE: 600; 310; 30; 20 INJECTION, SOLUTION INTRAVENOUS at 02:05

## 2022-05-13 RX ADMIN — ACETAMINOPHEN 650 MG: 325 TABLET ORAL at 02:05

## 2022-05-13 NOTE — CONSULTS
Pharmacy consulted to dose Vancomycin. Based on pt wt and current renal function, the following therapy will be initiated: Vancomycin 2000 mg IV q 18 hrs. Trough prior 4th dose. Pharmacy to follow daily and make necessary adjustments. Thank you for the consult.

## 2022-05-13 NOTE — SUBJECTIVE & OBJECTIVE
Interval History:     Review of Systems   Constitutional:  Negative for appetite change, fatigue and fever.   HENT:  Negative for congestion, hearing loss and trouble swallowing.    Respiratory:  Negative for chest tightness, shortness of breath and wheezing.    Cardiovascular:  Negative for chest pain and palpitations.   Gastrointestinal:  Positive for abdominal pain. Negative for constipation and nausea.   Genitourinary:  Positive for dysuria.   Musculoskeletal:  Negative for back pain and neck stiffness.   Skin:  Negative for pallor and rash.   Neurological:  Negative for dizziness, speech difficulty and headaches.   Psychiatric/Behavioral:  Negative for confusion and suicidal ideas.    Objective:     Vital Signs (Most Recent):  Temp: 98.2 °F (36.8 °C) (05/12/22 1600)  Pulse: 75 (05/12/22 1600)  Resp: 18 (05/12/22 1600)  BP: (!) 165/94 (05/12/22 1600)  SpO2: 98 % (05/12/22 1600)   Vital Signs (24h Range):  Temp:  [98 °F (36.7 °C)-99 °F (37.2 °C)] 98.2 °F (36.8 °C)  Pulse:  [69-75] 75  Resp:  [16-18] 18  SpO2:  [96 %-98 %] 98 %  BP: (165-172)/() 165/94     Weight: 93.7 kg (206 lb 9.6 oz)  Body mass index is 33.35 kg/m².  No intake or output data in the 24 hours ending 05/12/22 2308   Physical Exam  Vitals reviewed.   Constitutional:       General: She is not in acute distress.     Appearance: She is obese.   Eyes:      Pupils: Pupils are equal, round, and reactive to light.   Cardiovascular:      Rate and Rhythm: Normal rate and regular rhythm.      Pulses: Normal pulses.   Pulmonary:      Effort: Pulmonary effort is normal. No respiratory distress.      Breath sounds: Normal breath sounds. No wheezing.   Abdominal:      General: Bowel sounds are normal. There is no distension.      Tenderness: There is no abdominal tenderness.   Skin:     General: Skin is warm.   Neurological:      General: No focal deficit present.      Mental Status: She is alert, oriented to person, place, and time and easily aroused.  Mental status is at baseline.   Psychiatric:         Mood and Affect: Mood normal.         Behavior: Behavior normal.       Significant Labs: All pertinent labs within the past 24 hours have been reviewed.  BMP:   Recent Labs   Lab 05/12/22  0728   GLU 84      K 3.5      CO2 19*   BUN 7   CREATININE 0.56   CALCIUM 8.8     CBC:   Recent Labs   Lab 05/11/22 2025 05/12/22  0944   WBC 7.44 7.53   HGB 13.2 13.2   HCT 39.3 39.9    162     CMP:   Recent Labs   Lab 05/11/22 2025 05/12/22  0728    139   K 2.9* 3.5   * 103   CO2 20* 19*    84   BUN 5* 7   CREATININE 0.81 0.56   CALCIUM 8.8 8.8   PROT 7.3 7.1   ALBUMIN 3.2* 3.2*   BILITOT 0.8 0.8   ALKPHOS 73 72   AST 26 37   ALT 26 32   ANIONGAP 13 21*   EGFRNONAA 81 123       Significant Imaging: I have reviewed all pertinent imaging results/findings within the past 24 hours.

## 2022-05-13 NOTE — HOSPITAL COURSE
Chief Complaint   Patient presents with    Abdominal Pain         HPI: Patient is a 46-year-old female with a history of essential hypertension but otherwise seemingly healthy with no other known chronic medical issues who was seen in ED few days ago for evaluation of nausea vomiting and diffuse lower abdominal/suprapubic pain.  Patient was subsequently discharged home with a prescription for Bactrim but nausea and vomiting persisted and patient was not able to keep any medications or any food down prompting ED visit.  On presentation, patient was hypertensive, and tachypneic, but otherwise the rest of vitals were within normal limits.  Physical examination was notable for presence of suprapubic pain and ensuing workup was notable for presence of hypokalemia, and UA which was highly suggestive of a presence of urinary tract infection.  Patient will be admitted for further evaluation and intervention             Past Medical History:   Diagnosis Date    HTN (hypertension)           History reviewed. No pertinent surgical history.     Review of patient's allergies indicates:  No Known Allergies     No current facility-administered medications on file prior to encounter.           Current Outpatient Medications on File Prior to Encounter   Medication Sig    ondansetron (ZOFRAN) 4 MG tablet Take 1 tablet (4 mg total) by mouth every 6 (six) hours.    pantoprazole (PROTONIX) 20 MG tablet Take 1 tablet (20 mg total) by mouth once daily.    promethazine (PHENERGAN) 25 MG suppository Place 1 suppository (25 mg total) rectally every 6 (six) hours as needed for Nausea. Use if unable to tolerate oral Phenergan.    promethazine (PHENERGAN) 25 MG tablet Take 1 tablet (25 mg total) by mouth every 6 (six) hours as needed for Nausea.    promethazine (PHENERGAN) 25 MG tablet Take 1 tablet (25 mg total) by mouth every 6 (six) hours as needed for Nausea.    sulfamethoxazole-trimethoprim 800-160mg (BACTRIM DS) 800-160 mg Tab Take 1  tablet by mouth 2 (two) times daily. for 10 days      Family History    None              Tobacco Use    Smoking status: Never Smoker    Smokeless tobacco: Never Used   Substance and Sexual Activity    Alcohol use: Not Currently    Drug use: Never    Sexual activity: Not on file         05/12- Presented n/v and diffuse abd pain. Fever and chills. Some dysuria mild. No resp complaints. No N/V today. W/u ordered. Noted UTI and treating. Check cultures.  05/13 - Better with pain now more localized right kidney. Nausea improved during day. No recent BM. US to look at ovarian cyst. Follow cultures. E Coli in urine likely cause. MRSE 1 of 4 set positive likely contamination. If no change with this will stop vanco.  05/14 - feels better.  Nausea resolved sin think she could eat.  Much less pain.  No new issues.  Pelvic ultrasound appears is ovarian cyst or simple cyst.  Recommend she follow-up outpatient with gyn since has not had recent evaluation.  Right CVA tenderness basically resolved. MRSE 1 o4 bottles BC felt to be contamination and will stop vancomycin.  Continued treat for E coli UTI/pyelonephritis.  If continues to do well with increase activity and diet likely home soon.    05/15 - it up and about on room.  Nausea vomiting resolved and tolerating diet.  No pain.  Wanting to go home.  Will discharge after morning dose of antibiotics.  Patient to follow-up with clinic on 19 who she would like to uses primary care provider.  Discussed with her about taking it easy and slowly build up her activity level.  Discussed importance of finishing her antibiotics.  She is to report worsening.  Will also get her appointment to be seen by gyn to follow-up on her ovarian cyst and update her females exam.  Will discharge home.    Needs have blood pressure recheck in clinic also.

## 2022-05-13 NOTE — PROGRESS NOTES
[facility] Medicine  Progress Note    Patient Name: Dai Velez  MRN: [id number]  Patient Class: IP- Inpatient   Admission Date: 5/11/2022  Length of Stay: 1 days  Attending Physician: Chandan Carrillo MD  Primary Care Provider: Primary Doctor No        Subjective:     Principal Problem:Sepsis due to gram-negative UTI        HPI:  Patient is a 46-year-old female with a history of essential hypertension but otherwise seemingly healthy with no other known chronic medical issues who was seen in ED few days ago for evaluation of nausea vomiting and diffuse lower abdominal/suprapubic pain.  Patient was subsequently discharged home with a prescription for Bactrim but nausea and vomiting persisted and patient was not able to keep any medications or any food down prompting ED visit.  On presentation, patient was hypertensive, and tachypneic, but otherwise the rest of vitals were within normal limits.  Physical examination was notable for presence of suprapubic pain and ensuing workup was notable for presence of hypokalemia, and UA which was highly suggestive of a presence of urinary tract infection.  Patient will be admitted for further evaluation and intervention      Overview/Hospital Course:  05/12- Presented n/v and diffuse abd pain. Fever and chills. Some dysuria mild. No resp complaints. No N/V today. W/u ordered. Noted UTI and treating. Check cultures.      Interval History:     Review of Systems   Constitutional:  Negative for appetite change, fatigue and fever.   HENT:  Negative for congestion, hearing loss and trouble swallowing.    Respiratory:  Negative for chest tightness, shortness of breath and wheezing.    Cardiovascular:  Negative for chest pain and palpitations.   Gastrointestinal:  Positive for abdominal pain. Negative for constipation and nausea.   Genitourinary:  Positive for dysuria.   Musculoskeletal:  Negative for back pain and neck stiffness.   Skin:  Negative for pallor and  rash.   Neurological:  Negative for dizziness, speech difficulty and headaches.   Psychiatric/Behavioral:  Negative for confusion and suicidal ideas.    Objective:     Vital Signs (Most Recent):  Temp: 98.2 °F (36.8 °C) (05/12/22 1600)  Pulse: 75 (05/12/22 1600)  Resp: 18 (05/12/22 1600)  BP: (!) 165/94 (05/12/22 1600)  SpO2: 98 % (05/12/22 1600)   Vital Signs (24h Range):  Temp:  [98 °F (36.7 °C)-99 °F (37.2 °C)] 98.2 °F (36.8 °C)  Pulse:  [69-75] 75  Resp:  [16-18] 18  SpO2:  [96 %-98 %] 98 %  BP: (165-172)/() 165/94     Weight: 93.7 kg (206 lb 9.6 oz)  Body mass index is 33.35 kg/m².  No intake or output data in the 24 hours ending 05/12/22 2308   Physical Exam  Vitals reviewed.   Constitutional:       General: She is not in acute distress.     Appearance: She is obese.   Eyes:      Pupils: Pupils are equal, round, and reactive to light.   Cardiovascular:      Rate and Rhythm: Normal rate and regular rhythm.      Pulses: Normal pulses.   Pulmonary:      Effort: Pulmonary effort is normal. No respiratory distress.      Breath sounds: Normal breath sounds. No wheezing.   Abdominal:      General: Bowel sounds are normal. There is no distension.      Tenderness: There is no abdominal tenderness.   Skin:     General: Skin is warm.   Neurological:      General: No focal deficit present.      Mental Status: She is alert, oriented to person, place, and time and easily aroused. Mental status is at baseline.   Psychiatric:         Mood and Affect: Mood normal.         Behavior: Behavior normal.       Significant Labs: All pertinent labs within the past 24 hours have been reviewed.  BMP:   Recent Labs   Lab 05/12/22  0728   GLU 84      K 3.5      CO2 19*   BUN 7   CREATININE 0.56   CALCIUM 8.8     CBC:   Recent Labs   Lab 05/11/22 2025 05/12/22  0944   WBC 7.44 7.53   HGB 13.2 13.2   HCT 39.3 39.9    162     CMP:   Recent Labs   Lab 05/11/22 2025 05/12/22  0728    139   K 2.9* 3.5   *  103   CO2 20* 19*    84   BUN 5* 7   CREATININE 0.81 0.56   CALCIUM 8.8 8.8   PROT 7.3 7.1   ALBUMIN 3.2* 3.2*   BILITOT 0.8 0.8   ALKPHOS 73 72   AST 26 37   ALT 26 32   ANIONGAP 13 21*   EGFRNONAA 81 123       Significant Imaging: I have reviewed all pertinent imaging results/findings within the past 24 hours.          Assessment/Plan:      * Sepsis due to gram-negative UTI  -Patient met the sepsis criteria by virtue of being febrile and tachypneic with source being urinary tract infection  -In the past patient grew E coli which was basically pansensitive  -Will empirically start patient on Rocephin  -Panculture    05/12 add vancomycin for G+ in 1 of 2 BC although this might be contamination      Intractable vomiting  -Likely due to urinary tract infection  -N/v better with hydration  -start liquids      Hypokalemia  -Likely due to intractable nausea and vomiting  -Will replace potassium on p.r.n. basis        VTE Risk Mitigation (From admission, onward)         Ordered     enoxaparin injection 40 mg  Daily         05/11/22 2345     IP VTE HIGH RISK PATIENT  Once         05/11/22 2345     Place sequential compression device  Until discontinued         05/11/22 2345                Discharge Planning   DAV:      Code Status: Full Code   Is the patient medically ready for discharge?:     Reason for patient still in hospital (select all that apply): Laboratory test, Treatment and Imaging  Discharge Plan A: Home with family                  Chandan Carrillo MD  Department of Hospital Medicine   [facility] - Orthopedic

## 2022-05-13 NOTE — PLAN OF CARE
Problem: Adult Inpatient Plan of Care  Goal: Plan of Care Review  Outcome: Ongoing, Progressing  Goal: Patient-Specific Goal (Individualized)  Outcome: Ongoing, Progressing  Goal: Absence of Hospital-Acquired Illness or Injury  Outcome: Ongoing, Progressing  Intervention: Identify and Manage Fall Risk  Flowsheets (Taken 5/12/2022 2056)  Safety Promotion/Fall Prevention:   assistive device/personal item within reach   nonskid shoes/socks when out of bed   medications reviewed  Goal: Optimal Comfort and Wellbeing  Outcome: Ongoing, Progressing  Goal: Readiness for Transition of Care  Outcome: Ongoing, Progressing

## 2022-05-13 NOTE — ASSESSMENT & PLAN NOTE
-Patient met the sepsis criteria by virtue of being febrile and tachypneic with source being urinary tract infection  -In the past patient grew E coli which was basically pansensitive  -Will empirically start patient on Rocephin  -Panculture    05/12 add vancomycin for G+ in 1 of 2 BC although this might be contamination

## 2022-05-14 PROCEDURE — 99232 SBSQ HOSP IP/OBS MODERATE 35: CPT | Mod: ,,, | Performed by: HOSPITALIST

## 2022-05-14 PROCEDURE — 63600175 PHARM REV CODE 636 W HCPCS: Performed by: HOSPITALIST

## 2022-05-14 PROCEDURE — C9113 INJ PANTOPRAZOLE SODIUM, VIA: HCPCS | Performed by: HOSPITALIST

## 2022-05-14 PROCEDURE — 99232 PR SUBSEQUENT HOSPITAL CARE,LEVL II: ICD-10-PCS | Mod: ,,, | Performed by: HOSPITALIST

## 2022-05-14 PROCEDURE — 25000003 PHARM REV CODE 250: Performed by: HOSPITALIST

## 2022-05-14 PROCEDURE — 63600175 PHARM REV CODE 636 W HCPCS: Performed by: INTERNAL MEDICINE

## 2022-05-14 PROCEDURE — 25000003 PHARM REV CODE 250: Performed by: INTERNAL MEDICINE

## 2022-05-14 PROCEDURE — 11000001 HC ACUTE MED/SURG PRIVATE ROOM

## 2022-05-14 RX ORDER — AMLODIPINE AND BENAZEPRIL HYDROCHLORIDE 10; 40 MG/1; MG/1
1 CAPSULE ORAL DAILY
COMMUNITY
End: 2022-08-11 | Stop reason: ALTCHOICE

## 2022-05-14 RX ORDER — AMLODIPINE AND BENAZEPRIL HYDROCHLORIDE 10; 40 MG/1; MG/1
1 CAPSULE ORAL DAILY
Status: DISCONTINUED | OUTPATIENT
Start: 2022-05-14 | End: 2022-05-14

## 2022-05-14 RX ORDER — AMLODIPINE BESYLATE 10 MG/1
10 TABLET ORAL DAILY
Status: DISCONTINUED | OUTPATIENT
Start: 2022-05-14 | End: 2022-05-15 | Stop reason: HOSPADM

## 2022-05-14 RX ORDER — LISINOPRIL 40 MG/1
40 TABLET ORAL DAILY
Status: DISCONTINUED | OUTPATIENT
Start: 2022-05-14 | End: 2022-05-15 | Stop reason: HOSPADM

## 2022-05-14 RX ADMIN — AMLODIPINE BESYLATE 10 MG: 10 TABLET ORAL at 03:05

## 2022-05-14 RX ADMIN — ONDANSETRON 8 MG: 4 TABLET, ORALLY DISINTEGRATING ORAL at 07:05

## 2022-05-14 RX ADMIN — PANTOPRAZOLE SODIUM 40 MG: 40 INJECTION, POWDER, FOR SOLUTION INTRAVENOUS at 09:05

## 2022-05-14 RX ADMIN — CEFTRIAXONE SODIUM 1 G: 1 INJECTION, POWDER, FOR SOLUTION INTRAMUSCULAR; INTRAVENOUS at 03:05

## 2022-05-14 RX ADMIN — ENOXAPARIN SODIUM 40 MG: 40 INJECTION SUBCUTANEOUS at 05:05

## 2022-05-14 RX ADMIN — POTASSIUM CHLORIDE 20 MEQ: 1500 TABLET, EXTENDED RELEASE ORAL at 09:05

## 2022-05-14 RX ADMIN — SODIUM CHLORIDE, POTASSIUM CHLORIDE, SODIUM LACTATE AND CALCIUM CHLORIDE: 600; 310; 30; 20 INJECTION, SOLUTION INTRAVENOUS at 03:05

## 2022-05-14 RX ADMIN — LISINOPRIL 40 MG: 40 TABLET ORAL at 03:05

## 2022-05-14 NOTE — SUBJECTIVE & OBJECTIVE
Interval History:     Review of Systems   Constitutional:  Negative for appetite change, fatigue and fever.   HENT:  Negative for congestion, hearing loss and trouble swallowing.    Respiratory:  Negative for chest tightness, shortness of breath and wheezing.    Cardiovascular:  Negative for chest pain and palpitations.   Gastrointestinal:  Positive for abdominal pain. Negative for constipation and nausea.   Genitourinary:  Positive for dysuria.   Musculoskeletal:  Negative for back pain and neck stiffness.   Skin:  Negative for pallor and rash.   Neurological:  Negative for dizziness, speech difficulty and headaches.   Psychiatric/Behavioral:  Negative for confusion and suicidal ideas.    Objective:     Vital Signs (Most Recent):  Temp: 98.4 °F (36.9 °C) (05/14/22 1100)  Pulse: 63 (05/14/22 1100)  Resp: 18 (05/14/22 1100)  BP: (!) 171/94 (05/14/22 1100)  SpO2: 99 % (05/14/22 1100)   Vital Signs (24h Range):  Temp:  [98.3 °F (36.8 °C)-98.9 °F (37.2 °C)] 98.4 °F (36.9 °C)  Pulse:  [60-96] 63  Resp:  [16-22] 18  SpO2:  [97 %-100 %] 99 %  BP: (153-193)/() 171/94     Weight: 93.7 kg (206 lb 9.6 oz)  Body mass index is 33.35 kg/m².    Intake/Output Summary (Last 24 hours) at 5/14/2022 1147  Last data filed at 5/14/2022 0700  Gross per 24 hour   Intake 410 ml   Output --   Net 410 ml        Physical Exam  Vitals reviewed.   Constitutional:       General: She is not in acute distress.     Appearance: She is obese.   Eyes:      Pupils: Pupils are equal, round, and reactive to light.   Cardiovascular:      Rate and Rhythm: Normal rate and regular rhythm.      Pulses: Normal pulses.   Pulmonary:      Effort: Pulmonary effort is normal. No respiratory distress.      Breath sounds: Normal breath sounds. No wheezing.   Abdominal:      General: Bowel sounds are normal. There is no distension.      Tenderness: There is no abdominal tenderness.   Skin:     General: Skin is warm.   Neurological:      General: No focal deficit  present.      Mental Status: She is alert, oriented to person, place, and time and easily aroused. Mental status is at baseline.   Psychiatric:         Mood and Affect: Mood normal.         Behavior: Behavior normal.       Significant Labs: All pertinent labs within the past 24 hours have been reviewed.  BMP:   Recent Labs   Lab 05/13/22  0359   GLU 91      K 3.5      CO2 22   BUN 7   CREATININE 0.66   CALCIUM 8.4*       CBC:   Recent Labs   Lab 05/13/22 0359   WBC 5.78   HGB 12.9   HCT 40.6          CMP:   Recent Labs   Lab 05/13/22  0359      K 3.5      CO2 22   GLU 91   BUN 7   CREATININE 0.66   CALCIUM 8.4*   ANIONGAP 13   EGFRNONAA 102         Significant Imaging: I have reviewed all pertinent imaging results/findings within the past 24 hours.    Imaging Results              XR ABDOMEN, ACUTE 2 OR MORE VIEWS WITH CHEST (Final result)  Result time 05/12/22 07:35:34      Final result by Alphonse Martel MD (05/12/22 07:35:34)                   Impression:      No abnormality identified.      Electronically signed by: Alphonse Martel  Date:    05/12/2022  Time:    07:35               Narrative:    EXAMINATION:  XR ABDOMEN ACUTE 2 OR MORE VIEWS WITH CHEST    CLINICAL HISTORY:  abdominal pain;    TECHNIQUE:  PA chest radiograph, supine and erect views of the abdomen    COMPARISON:  None    FINDINGS:  Lungs clear.  Bowel gas pattern normal.  No abnormal calcifications.  No pneumoperitoneum.                                    Intake/Output - Last 3 Shifts         05/12 0700 05/13 0659 05/13 0700  05/14 0659 05/14 0700  05/15 0659    P.O.   360    I.V. (mL/kg) 240 (2.6)      IV Piggyback 100 50     Total Intake(mL/kg) 340 (3.6) 50 (0.5) 360 (3.8)    Net +340 +50 +360           Urine Occurrence   2 x    Stool Occurrence   1 x          Microbiology Results (last 7 days)       Procedure Component Value Units Date/Time    Blood culture (site 2) [[id number]]  (Abnormal) Collected: 05/12/22 0031     Order Status: Completed Specimen: Blood Updated: 05/14/22 0846     Culture, Blood Coagulase-negative Staphylococcus species     Comment: Identification and Susceptibility to Follow        Gram Stain Result Gram positive cocci     Comment: From Anaerobe bottle       Blood culture [[id number]] Collected: 05/12/22 2337    Order Status: Completed Specimen: Blood Updated: 05/14/22 0539     Culture, Blood No Growth At 24 Hours    Blood culture [[id number]] Collected: 05/12/22 2337    Order Status: Completed Specimen: Blood Updated: 05/14/22 0539     Culture, Blood No Growth At 24 Hours    Blood culture (site 1) [[id number]] Collected: 05/12/22 0031    Order Status: Completed Specimen: Blood Updated: 05/14/22 0539     Culture, Blood No Growth At 48 Hours    Verigene Gram-positive Blood Culture Test [[id number]]  (Abnormal) Collected: 05/12/22 0031    Order Status: Completed Specimen: Blood Updated: 05/13/22 0119     Verigene Result Methicillin Sensitive Staphylococcus epidermidis

## 2022-05-14 NOTE — PROGRESS NOTES
[facility] Medicine  Progress Note    Patient Name: Dai Velez  MRN: [id number]  Patient Class: IP- Inpatient   Admission Date: 5/11/2022  Length of Stay: 3 days  Attending Physician: Chandan Carrillo MD  Primary Care Provider: Primary Doctor No        Subjective:     Principal Problem:Sepsis due to gram-negative UTI        HPI:  Patient is a 46-year-old female with a history of essential hypertension but otherwise seemingly healthy with no other known chronic medical issues who was seen in ED few days ago for evaluation of nausea vomiting and diffuse lower abdominal/suprapubic pain.  Patient was subsequently discharged home with a prescription for Bactrim but nausea and vomiting persisted and patient was not able to keep any medications or any food down prompting ED visit.  On presentation, patient was hypertensive, and tachypneic, but otherwise the rest of vitals were within normal limits.  Physical examination was notable for presence of suprapubic pain and ensuing workup was notable for presence of hypokalemia, and UA which was highly suggestive of a presence of urinary tract infection.  Patient will be admitted for further evaluation and intervention      Overview/Hospital Course:  05/12- Presented n/v and diffuse abd pain. Fever and chills. Some dysuria mild. No resp complaints. No N/V today. W/u ordered. Noted UTI and treating. Check cultures.  05/13 - Better with pain now more localized right kidney. Nausea improved during day. No recent BM. US to look at ovarian cyst. Follow cultures. E Coli in urine likely cause. MRSE 1 of 4 set positive likely contamination. If no change with this will stop vanco.  05/14 - feels better.  Nausea resolved sin think she could eat.  Much less pain.  No new issues.  Pelvic ultrasound appears is ovarian cyst or simple cyst.  Recommend she follow-up outpatient with gyn since has not had recent evaluation.  Right CVA tenderness basically resolved. MRSE 1  o4 bottles BC felt to be contamination and will stop vancomycin.  Continued treat for E coli UTI/pyelonephritis.  If continues to do well with increase activity and diet likely home soon.      Interval History:     Review of Systems   Constitutional:  Negative for appetite change, fatigue and fever.   HENT:  Negative for congestion, hearing loss and trouble swallowing.    Respiratory:  Negative for chest tightness, shortness of breath and wheezing.    Cardiovascular:  Negative for chest pain and palpitations.   Gastrointestinal:  Positive for abdominal pain. Negative for constipation and nausea.   Genitourinary:  Positive for dysuria.   Musculoskeletal:  Negative for back pain and neck stiffness.   Skin:  Negative for pallor and rash.   Neurological:  Negative for dizziness, speech difficulty and headaches.   Psychiatric/Behavioral:  Negative for confusion and suicidal ideas.    Objective:     Vital Signs (Most Recent):  Temp: 98.4 °F (36.9 °C) (05/14/22 1100)  Pulse: 63 (05/14/22 1100)  Resp: 18 (05/14/22 1100)  BP: (!) 171/94 (05/14/22 1100)  SpO2: 99 % (05/14/22 1100)   Vital Signs (24h Range):  Temp:  [98.3 °F (36.8 °C)-98.9 °F (37.2 °C)] 98.4 °F (36.9 °C)  Pulse:  [60-96] 63  Resp:  [16-22] 18  SpO2:  [97 %-100 %] 99 %  BP: (153-193)/() 171/94     Weight: 93.7 kg (206 lb 9.6 oz)  Body mass index is 33.35 kg/m².    Intake/Output Summary (Last 24 hours) at 5/14/2022 1147  Last data filed at 5/14/2022 0700  Gross per 24 hour   Intake 410 ml   Output --   Net 410 ml        Physical Exam  Vitals reviewed.   Constitutional:       General: She is not in acute distress.     Appearance: She is obese.   Eyes:      Pupils: Pupils are equal, round, and reactive to light.   Cardiovascular:      Rate and Rhythm: Normal rate and regular rhythm.      Pulses: Normal pulses.   Pulmonary:      Effort: Pulmonary effort is normal. No respiratory distress.      Breath sounds: Normal breath sounds. No wheezing.   Abdominal:       General: Bowel sounds are normal. There is no distension.      Tenderness: There is no abdominal tenderness.   Skin:     General: Skin is warm.   Neurological:      General: No focal deficit present.      Mental Status: She is alert, oriented to person, place, and time and easily aroused. Mental status is at baseline.   Psychiatric:         Mood and Affect: Mood normal.         Behavior: Behavior normal.       Significant Labs: All pertinent labs within the past 24 hours have been reviewed.  BMP:   Recent Labs   Lab 05/13/22  0359   GLU 91      K 3.5      CO2 22   BUN 7   CREATININE 0.66   CALCIUM 8.4*       CBC:   Recent Labs   Lab 05/13/22 0359   WBC 5.78   HGB 12.9   HCT 40.6          CMP:   Recent Labs   Lab 05/13/22  0359      K 3.5      CO2 22   GLU 91   BUN 7   CREATININE 0.66   CALCIUM 8.4*   ANIONGAP 13   EGFRNONAA 102         Significant Imaging: I have reviewed all pertinent imaging results/findings within the past 24 hours.    Imaging Results              XR ABDOMEN, ACUTE 2 OR MORE VIEWS WITH CHEST (Final result)  Result time 05/12/22 07:35:34      Final result by Alphonse Martel MD (05/12/22 07:35:34)                   Impression:      No abnormality identified.      Electronically signed by: Alphonse Martel  Date:    05/12/2022  Time:    07:35               Narrative:    EXAMINATION:  XR ABDOMEN ACUTE 2 OR MORE VIEWS WITH CHEST    CLINICAL HISTORY:  abdominal pain;    TECHNIQUE:  PA chest radiograph, supine and erect views of the abdomen    COMPARISON:  None    FINDINGS:  Lungs clear.  Bowel gas pattern normal.  No abnormal calcifications.  No pneumoperitoneum.                                    Intake/Output - Last 3 Shifts         05/12 0700 05/13 0659 05/13 0700 05/14 0659 05/14 0700  05/15 0659    P.O.   360    I.V. (mL/kg) 240 (2.6)      IV Piggyback 100 50     Total Intake(mL/kg) 340 (3.6) 50 (0.5) 360 (3.8)    Net +340 +50 +360           Urine Occurrence   2 x     Stool Occurrence   1 x          Microbiology Results (last 7 days)       Procedure Component Value Units Date/Time    Blood culture (site 2) [[id number]]  (Abnormal) Collected: 05/12/22 0031    Order Status: Completed Specimen: Blood Updated: 05/14/22 0846     Culture, Blood Coagulase-negative Staphylococcus species     Comment: Identification and Susceptibility to Follow        Gram Stain Result Gram positive cocci     Comment: From Anaerobe bottle       Blood culture [[id number]] Collected: 05/12/22 2337    Order Status: Completed Specimen: Blood Updated: 05/14/22 0539     Culture, Blood No Growth At 24 Hours    Blood culture [[id number]] Collected: 05/12/22 2337    Order Status: Completed Specimen: Blood Updated: 05/14/22 0539     Culture, Blood No Growth At 24 Hours    Blood culture (site 1) [[id number]] Collected: 05/12/22 0031    Order Status: Completed Specimen: Blood Updated: 05/14/22 0539     Culture, Blood No Growth At 48 Hours    Verigene Gram-positive Blood Culture Test [[id number]]  (Abnormal) Collected: 05/12/22 0031    Order Status: Completed Specimen: Blood Updated: 05/13/22 0119     Verigene Result Methicillin Sensitive Staphylococcus epidermidis                   Assessment/Plan:      * Sepsis due to gram-negative UTI  -Patient met the sepsis criteria by virtue of being febrile and tachypneic with source being urinary tract infection  -In the past patient grew E coli which was basically pansensitive  -Will empirically start patient on Rocephin  -Panculture    05/12 add vancomycin for G+ in 1 of 2 BC although this might be contamination  05/14 1 of 4 BC positive for MRSE felt to be contamination and will stop vancomycin      Pyelonephritis    05/14 - Right sided from E Coli improved    Intractable vomiting  -Likely due to urinary tract infection  -N/v better with hydration  -start liquids    05/14 - nausea vomiting resolved and will advance diet      Hypokalemia  -Likely due to intractable nausea and  vomiting  -Will replace potassium on p.r.n. basis        VTE Risk Mitigation (From admission, onward)         Ordered     enoxaparin injection 40 mg  Daily         05/11/22 2345     IP VTE HIGH RISK PATIENT  Once         05/11/22 2345     Place sequential compression device  Until discontinued         05/11/22 2345                Discharge Planning   DAV:      Code Status: Full Code   Is the patient medically ready for discharge?:     Reason for patient still in hospital (select all that apply): Laboratory test, Treatment and Imaging  Discharge Plan A: Home with family                  Chandan Carrillo MD  Department of Hospital Medicine   [facility] - Orthopedic

## 2022-05-14 NOTE — PLAN OF CARE
Problem: Adult Inpatient Plan of Care  Goal: Plan of Care Review  Outcome: Ongoing, Progressing  Goal: Patient-Specific Goal (Individualized)  Outcome: Ongoing, Progressing  Goal: Absence of Hospital-Acquired Illness or Injury  Outcome: Ongoing, Progressing  Goal: Optimal Comfort and Wellbeing  Outcome: Ongoing, Progressing  Goal: Readiness for Transition of Care  Outcome: Ongoing, Progressing     Problem: Pain Acute  Goal: Acceptable Pain Control and Functional Ability  Outcome: Ongoing, Progressing  Intervention: Develop Pain Management Plan  Flowsheets (Taken 5/14/2022 1300)  Pain Management Interventions:   medication offered but refused   prescribed exercises encouraged   care clustered  Intervention: Prevent or Manage Pain  Flowsheets (Taken 5/14/2022 1300)  Sleep/Rest Enhancement:   awakenings minimized   noise level reduced  Intervention: Optimize Psychosocial Wellbeing  Flowsheets (Taken 5/14/2022 1300)  Supportive Measures: self-care encouraged     Problem: Pain Acute  Goal: Acceptable Pain Control and Functional Ability  Intervention: Prevent or Manage Pain  Flowsheets (Taken 5/14/2022 1300)  Sleep/Rest Enhancement:   awakenings minimized   noise level reduced     Problem: Pain Acute  Goal: Acceptable Pain Control and Functional Ability  Intervention: Optimize Psychosocial Wellbeing  Flowsheets (Taken 5/14/2022 1300)  Supportive Measures: self-care encouraged

## 2022-05-14 NOTE — PROGRESS NOTES
[facility] Medicine  Progress Note    Patient Name: Dai Velez  MRN: [id number]  Patient Class: IP- Inpatient   Admission Date: 5/11/2022  Length of Stay: 2 days  Attending Physician: Chandan Carrillo MD  Primary Care Provider: Primary Doctor No        Subjective:     Principal Problem:Sepsis due to gram-negative UTI        HPI:  Patient is a 46-year-old female with a history of essential hypertension but otherwise seemingly healthy with no other known chronic medical issues who was seen in ED few days ago for evaluation of nausea vomiting and diffuse lower abdominal/suprapubic pain.  Patient was subsequently discharged home with a prescription for Bactrim but nausea and vomiting persisted and patient was not able to keep any medications or any food down prompting ED visit.  On presentation, patient was hypertensive, and tachypneic, but otherwise the rest of vitals were within normal limits.  Physical examination was notable for presence of suprapubic pain and ensuing workup was notable for presence of hypokalemia, and UA which was highly suggestive of a presence of urinary tract infection.  Patient will be admitted for further evaluation and intervention      Overview/Hospital Course:  05/12- Presented n/v and diffuse abd pain. Fever and chills. Some dysuria mild. No resp complaints. No N/V today. W/u ordered. Noted UTI and treating. Check cultures.  05/13 - Better with pain now more localized right kidney. Nausea improved during day. No recent BM. US to look at ovarian cyst. Follow cultures. E Coli in urine likely cause. MRSE 1 of 4 set positive likely contamination. If no change with this will stop vanco.      Interval History:     Review of Systems   Constitutional:  Negative for appetite change, fatigue and fever.   HENT:  Negative for congestion, hearing loss and trouble swallowing.    Respiratory:  Negative for chest tightness, shortness of breath and wheezing.    Cardiovascular:   Negative for chest pain and palpitations.   Gastrointestinal:  Positive for abdominal pain. Negative for constipation and nausea.   Genitourinary:  Positive for dysuria.   Musculoskeletal:  Negative for back pain and neck stiffness.   Skin:  Negative for pallor and rash.   Neurological:  Negative for dizziness, speech difficulty and headaches.   Psychiatric/Behavioral:  Negative for confusion and suicidal ideas.    Objective:     Vital Signs (Most Recent):  Temp: 98.4 °F (36.9 °C) (05/13/22 1600)  Pulse: 60 (05/13/22 1600)  Resp: (!) 22 (05/13/22 1600)  BP: (!) 173/98 (05/13/22 1600)  SpO2: 99 % (05/13/22 1600)   Vital Signs (24h Range):  Temp:  [98.4 °F (36.9 °C)-100.6 °F (38.1 °C)] 98.4 °F (36.9 °C)  Pulse:  [60-72] 60  Resp:  [18-22] 22  SpO2:  [94 %-100 %] 99 %  BP: (142-173)/() 173/98     Weight: 93.7 kg (206 lb 9.6 oz)  Body mass index is 33.35 kg/m².    Intake/Output Summary (Last 24 hours) at 5/13/2022 2252  Last data filed at 5/13/2022 0500  Gross per 24 hour   Intake 340 ml   Output --   Net 340 ml        Physical Exam  Vitals reviewed.   Constitutional:       General: She is not in acute distress.     Appearance: She is obese.   Eyes:      Pupils: Pupils are equal, round, and reactive to light.   Cardiovascular:      Rate and Rhythm: Normal rate and regular rhythm.      Pulses: Normal pulses.   Pulmonary:      Effort: Pulmonary effort is normal. No respiratory distress.      Breath sounds: Normal breath sounds. No wheezing.   Abdominal:      General: Bowel sounds are normal. There is no distension.      Tenderness: There is no abdominal tenderness.   Skin:     General: Skin is warm.   Neurological:      General: No focal deficit present.      Mental Status: She is alert, oriented to person, place, and time and easily aroused. Mental status is at baseline.   Psychiatric:         Mood and Affect: Mood normal.         Behavior: Behavior normal.       Significant Labs: All pertinent labs within the past  24 hours have been reviewed.  BMP:   Recent Labs   Lab 05/13/22 0359   GLU 91      K 3.5      CO2 22   BUN 7   CREATININE 0.66   CALCIUM 8.4*       CBC:   Recent Labs   Lab 05/12/22  0944 05/13/22 0359   WBC 7.53 5.78   HGB 13.2 12.9   HCT 39.9 40.6    176       CMP:   Recent Labs   Lab 05/12/22  0728 05/13/22 0359    138   K 3.5 3.5    107   CO2 19* 22   GLU 84 91   BUN 7 7   CREATININE 0.56 0.66   CALCIUM 8.8 8.4*   PROT 7.1  --    ALBUMIN 3.2*  --    BILITOT 0.8  --    ALKPHOS 72  --    AST 37  --    ALT 32  --    ANIONGAP 21* 13   EGFRNONAA 123 102         Significant Imaging: I have reviewed all pertinent imaging results/findings within the past 24 hours.    Imaging Results              XR ABDOMEN, ACUTE 2 OR MORE VIEWS WITH CHEST (Final result)  Result time 05/12/22 07:35:34      Final result by Alphonse Martel MD (05/12/22 07:35:34)                   Impression:      No abnormality identified.      Electronically signed by: Alphonse Martel  Date:    05/12/2022  Time:    07:35               Narrative:    EXAMINATION:  XR ABDOMEN ACUTE 2 OR MORE VIEWS WITH CHEST    CLINICAL HISTORY:  abdominal pain;    TECHNIQUE:  PA chest radiograph, supine and erect views of the abdomen    COMPARISON:  None    FINDINGS:  Lungs clear.  Bowel gas pattern normal.  No abnormal calcifications.  No pneumoperitoneum.                                    Intake/Output - Last 3 Shifts         05/12 0700 05/13 0659 05/13 0700 05/14 0659    I.V. (mL/kg) 240 (2.6)     IV Piggyback 100     Total Intake(mL/kg) 340 (3.6)     Net +340                 Microbiology Results (last 7 days)       Procedure Component Value Units Date/Time    Verigene Gram-positive Blood Culture Test [[id number]]  (Abnormal) Collected: 05/12/22 0031    Order Status: Completed Specimen: Blood Updated: 05/13/22 0119     Verigene Result Methicillin Sensitive Staphylococcus epidermidis     Blood culture [[id number]] Collected: 05/12/22 1780     Order Status: Sent Specimen: Blood Updated: 05/12/22 2344    Blood culture [[id number]] Collected: 05/12/22 2337    Order Status: Sent Specimen: Blood Updated: 05/12/22 2344    Blood culture (site 2) [[id number]]  (Abnormal) Collected: 05/12/22 0031    Order Status: Completed Specimen: Blood Updated: 05/12/22 2232     Gram Stain Result Gram positive cocci     Comment: From Anaerobe bottle       Blood culture (site 1) [[id number]] Collected: 05/12/22 0031    Order Status: Resulted Specimen: Blood Updated: 05/12/22 0037                  Assessment/Plan:      * Sepsis due to gram-negative UTI  -Patient met the sepsis criteria by virtue of being febrile and tachypneic with source being urinary tract infection  -In the past patient grew E coli which was basically pansensitive  -Will empirically start patient on Rocephin  -Panculture    05/12 add vancomycin for G+ in 1 of 2 BC although this might be contamination      Pyelonephritis    Right sided from E Coli    Intractable vomiting  -Likely due to urinary tract infection  -N/v better with hydration  -start liquids      Hypokalemia  -Likely due to intractable nausea and vomiting  -Will replace potassium on p.r.n. basis        VTE Risk Mitigation (From admission, onward)         Ordered     enoxaparin injection 40 mg  Daily         05/11/22 2345     IP VTE HIGH RISK PATIENT  Once         05/11/22 2345     Place sequential compression device  Until discontinued         05/11/22 2345                Discharge Planning   DAV:      Code Status: Full Code   Is the patient medically ready for discharge?:     Reason for patient still in hospital (select all that apply): Laboratory test, Treatment and Imaging  Discharge Plan A: Home with family                  Chandan Carrillo MD  Department of Hospital Medicine   [facility] - Orthopedic

## 2022-05-14 NOTE — ASSESSMENT & PLAN NOTE
-Likely due to urinary tract infection  -N/v better with hydration  -start liquids    05/14 - nausea vomiting resolved and will advance diet

## 2022-05-14 NOTE — ASSESSMENT & PLAN NOTE
-Patient met the sepsis criteria by virtue of being febrile and tachypneic with source being urinary tract infection  -In the past patient grew E coli which was basically pansensitive  -Will empirically start patient on Rocephin  -Panculture    05/12 add vancomycin for G+ in 1 of 2 BC although this might be contamination  05/14 1 of 4 BC positive for MRSE felt to be contamination and will stop vancomycin

## 2022-05-15 VITALS
HEIGHT: 66 IN | TEMPERATURE: 98 F | SYSTOLIC BLOOD PRESSURE: 145 MMHG | OXYGEN SATURATION: 98 % | RESPIRATION RATE: 16 BRPM | DIASTOLIC BLOOD PRESSURE: 81 MMHG | BODY MASS INDEX: 33.21 KG/M2 | WEIGHT: 206.63 LBS | HEART RATE: 62 BPM

## 2022-05-15 PROBLEM — R11.10 INTRACTABLE VOMITING: Status: RESOLVED | Noted: 2022-05-11 | Resolved: 2022-05-15

## 2022-05-15 PROBLEM — R03.0 ELEVATED BLOOD PRESSURE READING WITHOUT DIAGNOSIS OF HYPERTENSION: Status: ACTIVE | Noted: 2022-05-15

## 2022-05-15 PROBLEM — E87.6 HYPOKALEMIA: Status: RESOLVED | Noted: 2022-05-11 | Resolved: 2022-05-15

## 2022-05-15 PROBLEM — I10 ESSENTIAL HYPERTENSION: Status: ACTIVE | Noted: 2022-05-15

## 2022-05-15 LAB
ANION GAP SERPL CALCULATED.3IONS-SCNC: 19 MMOL/L (ref 7–16)
BASOPHILS # BLD AUTO: 0.04 K/UL (ref 0–0.2)
BASOPHILS NFR BLD AUTO: 0.8 % (ref 0–1)
BUN SERPL-MCNC: 9 MG/DL (ref 7–18)
BUN/CREAT SERPL: 13 (ref 6–20)
CALCIUM SERPL-MCNC: 9.4 MG/DL (ref 8.5–10.1)
CHLORIDE SERPL-SCNC: 104 MMOL/L (ref 98–107)
CO2 SERPL-SCNC: 22 MMOL/L (ref 21–32)
CREAT SERPL-MCNC: 0.69 MG/DL (ref 0.55–1.02)
DIFFERENTIAL METHOD BLD: ABNORMAL
EOSINOPHIL # BLD AUTO: 0.11 K/UL (ref 0–0.5)
EOSINOPHIL NFR BLD AUTO: 2.2 % (ref 1–4)
ERYTHROCYTE [DISTWIDTH] IN BLOOD BY AUTOMATED COUNT: 13 % (ref 11.5–14.5)
GLUCOSE SERPL-MCNC: 94 MG/DL (ref 74–106)
HCT VFR BLD AUTO: 39.7 % (ref 38–47)
HGB BLD-MCNC: 12.8 G/DL (ref 12–16)
IMM GRANULOCYTES # BLD AUTO: 0.03 K/UL (ref 0–0.04)
IMM GRANULOCYTES NFR BLD: 0.6 % (ref 0–0.4)
LYMPHOCYTES # BLD AUTO: 1.67 K/UL (ref 1–4.8)
LYMPHOCYTES NFR BLD AUTO: 33.5 % (ref 27–41)
MAGNESIUM SERPL-MCNC: 2.3 MG/DL (ref 1.7–2.3)
MCH RBC QN AUTO: 27.9 PG (ref 27–31)
MCHC RBC AUTO-ENTMCNC: 32.2 G/DL (ref 32–36)
MCV RBC AUTO: 86.5 FL (ref 80–96)
MONOCYTES # BLD AUTO: 0.46 K/UL (ref 0–0.8)
MONOCYTES NFR BLD AUTO: 9.2 % (ref 2–6)
MPC BLD CALC-MCNC: 13.2 FL (ref 9.4–12.4)
NEUTROPHILS # BLD AUTO: 2.68 K/UL (ref 1.8–7.7)
NEUTROPHILS NFR BLD AUTO: 53.7 % (ref 53–65)
NRBC # BLD AUTO: 0 X10E3/UL
NRBC, AUTO (.00): 0 %
PLATELET # BLD AUTO: 192 K/UL (ref 150–400)
PLATELET MORPHOLOGY: ABNORMAL
POTASSIUM SERPL-SCNC: 4 MMOL/L (ref 3.5–5.1)
RBC # BLD AUTO: 4.59 M/UL (ref 4.2–5.4)
RBC MORPH BLD: NORMAL
SODIUM SERPL-SCNC: 141 MMOL/L (ref 136–145)
WBC # BLD AUTO: 4.99 K/UL (ref 4.5–11)

## 2022-05-15 PROCEDURE — 63600175 PHARM REV CODE 636 W HCPCS: Performed by: HOSPITALIST

## 2022-05-15 PROCEDURE — 99239 HOSP IP/OBS DSCHRG MGMT >30: CPT | Mod: ,,, | Performed by: HOSPITALIST

## 2022-05-15 PROCEDURE — 63600175 PHARM REV CODE 636 W HCPCS: Performed by: INTERNAL MEDICINE

## 2022-05-15 PROCEDURE — 25000003 PHARM REV CODE 250: Performed by: INTERNAL MEDICINE

## 2022-05-15 PROCEDURE — 85025 COMPLETE CBC W/AUTO DIFF WBC: CPT | Performed by: HOSPITALIST

## 2022-05-15 PROCEDURE — 99239 PR HOSPITAL DISCHARGE DAY,>30 MIN: ICD-10-PCS | Mod: ,,, | Performed by: HOSPITALIST

## 2022-05-15 PROCEDURE — 80048 BASIC METABOLIC PNL TOTAL CA: CPT | Performed by: HOSPITALIST

## 2022-05-15 PROCEDURE — 25000003 PHARM REV CODE 250: Performed by: HOSPITALIST

## 2022-05-15 PROCEDURE — 83735 ASSAY OF MAGNESIUM: CPT | Performed by: HOSPITALIST

## 2022-05-15 PROCEDURE — 36415 COLL VENOUS BLD VENIPUNCTURE: CPT | Performed by: HOSPITALIST

## 2022-05-15 PROCEDURE — C9113 INJ PANTOPRAZOLE SODIUM, VIA: HCPCS | Performed by: HOSPITALIST

## 2022-05-15 RX ORDER — CEFUROXIME AXETIL 250 MG/1
250 TABLET ORAL EVERY 12 HOURS
Qty: 14 TABLET | Refills: 0 | Status: SHIPPED | OUTPATIENT
Start: 2022-05-15 | End: 2022-05-22

## 2022-05-15 RX ADMIN — CEFTRIAXONE SODIUM 1 G: 1 INJECTION, POWDER, FOR SOLUTION INTRAMUSCULAR; INTRAVENOUS at 10:05

## 2022-05-15 RX ADMIN — ACETAMINOPHEN 650 MG: 325 TABLET ORAL at 09:05

## 2022-05-15 RX ADMIN — SODIUM CHLORIDE, POTASSIUM CHLORIDE, SODIUM LACTATE AND CALCIUM CHLORIDE: 600; 310; 30; 20 INJECTION, SOLUTION INTRAVENOUS at 05:05

## 2022-05-15 RX ADMIN — LISINOPRIL 40 MG: 40 TABLET ORAL at 09:05

## 2022-05-15 RX ADMIN — AMLODIPINE BESYLATE 10 MG: 10 TABLET ORAL at 09:05

## 2022-05-15 RX ADMIN — CEFTRIAXONE SODIUM 1 G: 1 INJECTION, POWDER, FOR SOLUTION INTRAMUSCULAR; INTRAVENOUS at 02:05

## 2022-05-15 RX ADMIN — PANTOPRAZOLE SODIUM 40 MG: 40 INJECTION, POWDER, FOR SOLUTION INTRAVENOUS at 09:05

## 2022-05-15 NOTE — ASSESSMENT & PLAN NOTE
Right sided from E Coli improved.  Will discharge home.  Discussed with her about the importance of finishing her antibiotics after discharge.

## 2022-05-15 NOTE — DISCHARGE SUMMARY
[facility] Medicine  Discharge Summary      Patient Name: Dai Velez  MRN: [id number]  Patient Class: IP- Inpatient  Admission Date: 5/11/2022  Hospital Length of Stay: 4 days  Discharge Date and Time:  05/15/2022 10:20 AM  Attending Physician: Chandan Carrillo MD   Discharging Provider: Chandan Carrillo MD  Primary Care Provider: Primary Doctor No      HPI:   Patient is a 46-year-old female with a history of essential hypertension but otherwise seemingly healthy with no other known chronic medical issues who was seen in ED few days ago for evaluation of nausea vomiting and diffuse lower abdominal/suprapubic pain.  Patient was subsequently discharged home with a prescription for Bactrim but nausea and vomiting persisted and patient was not able to keep any medications or any food down prompting ED visit.  On presentation, patient was hypertensive, and tachypneic, but otherwise the rest of vitals were within normal limits.  Physical examination was notable for presence of suprapubic pain and ensuing workup was notable for presence of hypokalemia, and UA which was highly suggestive of a presence of urinary tract infection.  Patient will be admitted for further evaluation and intervention      * No surgery found *      Hospital Course:   Chief Complaint   Patient presents with    Abdominal Pain         HPI: Patient is a 46-year-old female with a history of essential hypertension but otherwise seemingly healthy with no other known chronic medical issues who was seen in ED few days ago for evaluation of nausea vomiting and diffuse lower abdominal/suprapubic pain.  Patient was subsequently discharged home with a prescription for Bactrim but nausea and vomiting persisted and patient was not able to keep any medications or any food down prompting ED visit.  On presentation, patient was hypertensive, and tachypneic, but otherwise the rest of vitals were within normal limits.  Physical examination was  notable for presence of suprapubic pain and ensuing workup was notable for presence of hypokalemia, and UA which was highly suggestive of a presence of urinary tract infection.  Patient will be admitted for further evaluation and intervention             Past Medical History:   Diagnosis Date    HTN (hypertension)           History reviewed. No pertinent surgical history.     Review of patient's allergies indicates:  No Known Allergies     No current facility-administered medications on file prior to encounter.           Current Outpatient Medications on File Prior to Encounter   Medication Sig    ondansetron (ZOFRAN) 4 MG tablet Take 1 tablet (4 mg total) by mouth every 6 (six) hours.    pantoprazole (PROTONIX) 20 MG tablet Take 1 tablet (20 mg total) by mouth once daily.    promethazine (PHENERGAN) 25 MG suppository Place 1 suppository (25 mg total) rectally every 6 (six) hours as needed for Nausea. Use if unable to tolerate oral Phenergan.    promethazine (PHENERGAN) 25 MG tablet Take 1 tablet (25 mg total) by mouth every 6 (six) hours as needed for Nausea.    promethazine (PHENERGAN) 25 MG tablet Take 1 tablet (25 mg total) by mouth every 6 (six) hours as needed for Nausea.    sulfamethoxazole-trimethoprim 800-160mg (BACTRIM DS) 800-160 mg Tab Take 1 tablet by mouth 2 (two) times daily. for 10 days      Family History    None              Tobacco Use    Smoking status: Never Smoker    Smokeless tobacco: Never Used   Substance and Sexual Activity    Alcohol use: Not Currently    Drug use: Never    Sexual activity: Not on file         05/12- Presented n/v and diffuse abd pain. Fever and chills. Some dysuria mild. No resp complaints. No N/V today. W/u ordered. Noted UTI and treating. Check cultures.  05/13 - Better with pain now more localized right kidney. Nausea improved during day. No recent BM. US to look at ovarian cyst. Follow cultures. E Coli in urine likely cause. MRSE 1 of 4 set positive likely  contamination. If no change with this will stop vanco.  05/14 - feels better.  Nausea resolved sin think she could eat.  Much less pain.  No new issues.  Pelvic ultrasound appears is ovarian cyst or simple cyst.  Recommend she follow-up outpatient with gyn since has not had recent evaluation.  Right CVA tenderness basically resolved. MRSE 1 o4 bottles BC felt to be contamination and will stop vancomycin.  Continued treat for E coli UTI/pyelonephritis.  If continues to do well with increase activity and diet likely home soon.    05/15 - it up and about on room.  Nausea vomiting resolved and tolerating diet.  No pain.  Wanting to go home.  Will discharge after morning dose of antibiotics.  Patient to follow-up with clinic on 19 who she would like to uses primary care provider.  Discussed with her about taking it easy and slowly build up her activity level.  Discussed importance of finishing her antibiotics.  She is to report worsening.  Will also get her appointment to be seen by gyn to follow-up on her ovarian cyst and update her females exam.  Will discharge home.    Needs have blood pressure recheck in clinic also.               Goals of Care Treatment Preferences:  Code Status: Full Code      Consults:   Consults (From admission, onward)        Status Ordering Provider     Pharmacy to dose Vancomycin consult  Once        Provider:  (Not yet assigned)    Completed BERTHA THIBODEAUX          * Acute pyelonephritis     Right sided from E Coli improved.  Will discharge home.  Discussed with her about the importance of finishing her antibiotics after discharge.     Essential hypertension    Blood pressure to be rechecked in clinic    Final Active Diagnoses:    Diagnosis Date Noted POA    PRINCIPAL PROBLEM:  Acute pyelonephritis [N10]  Yes    Essential hypertension [I10] 05/15/2022 Yes      Problems Resolved During this Admission:    Diagnosis Date Noted Date Resolved POA    Hypokalemia [E87.6] 05/11/2022 05/15/2022 Yes     Intractable vomiting [R11.10] 05/11/2022 05/15/2022 Yes       Discharged Condition: good    Disposition: Home or Self Care    Follow Up:   Follow-up Information     Falguni Aylaa,  Follow up.    Specialty: Family Medicine  Contact information:  [address]  [facility] [place] [address]  [phone]             Lizzette Sosa MD Follow up in 3 week(s).    Specialty: Obstetrics and Gynecology  Contact information:  [address] [place] [address]  [facility] [place] [address]  [phone]                       Patient Instructions:      Diet Adult Regular     Activity as tolerated       Significant Diagnostic Studies: Labs:   BMP:   Recent Labs   Lab 05/15/22  0457   GLU 94      K 4.0      CO2 22   BUN 9   CREATININE 0.69   CALCIUM 9.4   MG 2.3   , CMP   Recent Labs   Lab 05/15/22  0457      K 4.0      CO2 22   GLU 94   BUN 9   CREATININE 0.69   CALCIUM 9.4   ANIONGAP 19*   EGFRNONAA 97    and CBC   Recent Labs   Lab 05/15/22  0457   WBC 4.99   HGB 12.8   HCT 39.7          Pending Diagnostic Studies:     None         Medications:  Reconciled Home Medications:      Medication List      START taking these medications    cefUROXime 250 MG tablet  Commonly known as: CEFTIN  Take 1 tablet (250 mg total) by mouth every 12 (twelve) hours. for 7 days        CHANGE how you take these medications    * promethazine 25 MG tablet  Commonly known as: PHENERGAN  Take 1 tablet (25 mg total) by mouth every 6 (six) hours as needed for Nausea.  What changed: Another medication with the same name was removed. Continue taking this medication, and follow the directions you see here.     * promethazine 25 MG suppository  Commonly known as: PHENERGAN  Place 1 suppository (25 mg total) rectally every 6 (six) hours as needed for Nausea. Use if unable to tolerate oral Phenergan.  What changed: Another medication with the same name was removed. Continue taking this medication, and follow  the directions you see here.         * This list has 2 medication(s) that are the same as other medications prescribed for you. Read the directions carefully, and ask your doctor or other care provider to review them with you.            CONTINUE taking these medications    amLODIPine-benazepriL 10-40 mg per capsule  Commonly known as: LOTREL  Take 1 capsule by mouth once daily.     ondansetron 4 MG tablet  Commonly known as: ZOFRAN  Take 1 tablet (4 mg total) by mouth every 6 (six) hours.     pantoprazole 20 MG tablet  Commonly known as: PROTONIX  Take 1 tablet (20 mg total) by mouth once daily.        STOP taking these medications    sulfamethoxazole-trimethoprim 800-160mg 800-160 mg Tab  Commonly known as: BACTRIM DS            Indwelling Lines/Drains at time of discharge:   Lines/Drains/Airways     None                 Time spent on the discharge of patient: more 30   minutes         Chandan Carrillo MD  Department of Hospital Medicine  [facility] - Orthopedic

## 2022-05-16 LAB
BACTERIA BLD CULT: ABNORMAL
GRAM STN SPEC: ABNORMAL

## 2022-05-17 LAB — BACTERIA BLD CULT: NORMAL

## 2022-05-18 LAB
BACTERIA BLD CULT: NORMAL
BACTERIA BLD CULT: NORMAL

## 2022-06-09 ENCOUNTER — HOSPITAL ENCOUNTER (EMERGENCY)
Facility: HOSPITAL | Age: 47
Discharge: HOME | End: 2022-06-09
Payer: MEDICAID

## 2022-06-09 VITALS
BODY MASS INDEX: 33.59 KG/M2 | HEIGHT: 66 IN | WEIGHT: 209 LBS | RESPIRATION RATE: 17 BRPM | HEART RATE: 82 BPM | SYSTOLIC BLOOD PRESSURE: 179 MMHG | OXYGEN SATURATION: 99 % | TEMPERATURE: 99 F | DIASTOLIC BLOOD PRESSURE: 118 MMHG

## 2022-06-09 DIAGNOSIS — H00.031 ABSCESS OF RIGHT UPPER EYELID: Primary | ICD-10-CM

## 2022-06-09 DIAGNOSIS — L02.91 ABSCESS: ICD-10-CM

## 2022-06-09 PROCEDURE — 99283 PR EMERGENCY DEPT VISIT,LEVEL III: ICD-10-PCS | Mod: ,,, | Performed by: NURSE PRACTITIONER

## 2022-06-09 PROCEDURE — 63600175 PHARM REV CODE 636 W HCPCS: Performed by: NURSE PRACTITIONER

## 2022-06-09 PROCEDURE — 96375 TX/PRO/DX INJ NEW DRUG ADDON: CPT

## 2022-06-09 PROCEDURE — 96365 THER/PROPH/DIAG IV INF INIT: CPT

## 2022-06-09 PROCEDURE — 99284 EMERGENCY DEPT VISIT MOD MDM: CPT | Mod: 25

## 2022-06-09 PROCEDURE — 99283 EMERGENCY DEPT VISIT LOW MDM: CPT | Mod: ,,, | Performed by: NURSE PRACTITIONER

## 2022-06-09 PROCEDURE — 25000003 PHARM REV CODE 250: Performed by: NURSE PRACTITIONER

## 2022-06-09 RX ORDER — KETOROLAC TROMETHAMINE 30 MG/ML
30 INJECTION, SOLUTION INTRAMUSCULAR; INTRAVENOUS
Status: COMPLETED | OUTPATIENT
Start: 2022-06-09 | End: 2022-06-09

## 2022-06-09 RX ORDER — DOXYCYCLINE 100 MG/1
100 CAPSULE ORAL 2 TIMES DAILY
Qty: 20 CAPSULE | Refills: 0 | Status: SHIPPED | OUTPATIENT
Start: 2022-06-09 | End: 2022-06-19

## 2022-06-09 RX ORDER — CLINDAMYCIN PHOSPHATE 900 MG/50ML
900 INJECTION, SOLUTION INTRAVENOUS
Status: COMPLETED | OUTPATIENT
Start: 2022-06-09 | End: 2022-06-09

## 2022-06-09 RX ADMIN — CLINDAMYCIN IN 5 PERCENT DEXTROSE 900 MG: 18 INJECTION, SOLUTION INTRAVENOUS at 07:06

## 2022-06-09 RX ADMIN — KETOROLAC TROMETHAMINE 30 MG: 30 INJECTION, SOLUTION INTRAMUSCULAR; INTRAVENOUS at 09:06

## 2022-06-10 ENCOUNTER — OFFICE VISIT (OUTPATIENT)
Dept: FAMILY MEDICINE | Facility: CLINIC | Age: 47
End: 2022-06-10
Payer: MEDICAID

## 2022-06-10 VITALS
HEIGHT: 67 IN | DIASTOLIC BLOOD PRESSURE: 92 MMHG | WEIGHT: 203 LBS | HEART RATE: 80 BPM | OXYGEN SATURATION: 98 % | BODY MASS INDEX: 31.86 KG/M2 | TEMPERATURE: 99 F | SYSTOLIC BLOOD PRESSURE: 148 MMHG

## 2022-06-10 DIAGNOSIS — H00.033 ABSCESS, EYELID, RIGHT: Primary | ICD-10-CM

## 2022-06-10 DIAGNOSIS — L02.31 ABSCESS OF LEFT BUTTOCK: ICD-10-CM

## 2022-06-10 DIAGNOSIS — L02.11 ABSCESS, NECK: ICD-10-CM

## 2022-06-10 PROCEDURE — 3080F PR MOST RECENT DIASTOLIC BLOOD PRESSURE >= 90 MM HG: ICD-10-PCS | Mod: CPTII,,, | Performed by: NURSE PRACTITIONER

## 2022-06-10 PROCEDURE — 3077F PR MOST RECENT SYSTOLIC BLOOD PRESSURE >= 140 MM HG: ICD-10-PCS | Mod: CPTII,,, | Performed by: NURSE PRACTITIONER

## 2022-06-10 PROCEDURE — 4010F PR ACE/ARB THEARPY RXD/TAKEN: ICD-10-PCS | Mod: CPTII,,, | Performed by: NURSE PRACTITIONER

## 2022-06-10 PROCEDURE — 3080F DIAST BP >= 90 MM HG: CPT | Mod: CPTII,,, | Performed by: NURSE PRACTITIONER

## 2022-06-10 PROCEDURE — 3008F BODY MASS INDEX DOCD: CPT | Mod: CPTII,,, | Performed by: NURSE PRACTITIONER

## 2022-06-10 PROCEDURE — 3077F SYST BP >= 140 MM HG: CPT | Mod: CPTII,,, | Performed by: NURSE PRACTITIONER

## 2022-06-10 PROCEDURE — 99203 PR OFFICE/OUTPT VISIT, NEW, LEVL III, 30-44 MIN: ICD-10-PCS | Mod: 25,,, | Performed by: NURSE PRACTITIONER

## 2022-06-10 PROCEDURE — 99203 OFFICE O/P NEW LOW 30 MIN: CPT | Mod: 25,,, | Performed by: NURSE PRACTITIONER

## 2022-06-10 PROCEDURE — 96372 THER/PROPH/DIAG INJ SC/IM: CPT | Mod: ,,, | Performed by: NURSE PRACTITIONER

## 2022-06-10 PROCEDURE — 4010F ACE/ARB THERAPY RXD/TAKEN: CPT | Mod: CPTII,,, | Performed by: NURSE PRACTITIONER

## 2022-06-10 PROCEDURE — 1111F PR DISCHARGE MEDS RECONCILED W/ CURRENT OUTPATIENT MED LIST: ICD-10-PCS | Mod: CPTII,,, | Performed by: NURSE PRACTITIONER

## 2022-06-10 PROCEDURE — 1159F MED LIST DOCD IN RCRD: CPT | Mod: CPTII,,, | Performed by: NURSE PRACTITIONER

## 2022-06-10 PROCEDURE — 1159F PR MEDICATION LIST DOCUMENTED IN MEDICAL RECORD: ICD-10-PCS | Mod: CPTII,,, | Performed by: NURSE PRACTITIONER

## 2022-06-10 PROCEDURE — 3008F PR BODY MASS INDEX (BMI) DOCUMENTED: ICD-10-PCS | Mod: CPTII,,, | Performed by: NURSE PRACTITIONER

## 2022-06-10 PROCEDURE — 96372 PR INJECTION,THERAP/PROPH/DIAG2ST, IM OR SUBCUT: ICD-10-PCS | Mod: ,,, | Performed by: NURSE PRACTITIONER

## 2022-06-10 PROCEDURE — 1111F DSCHRG MED/CURRENT MED MERGE: CPT | Mod: CPTII,,, | Performed by: NURSE PRACTITIONER

## 2022-06-10 RX ORDER — TRAMADOL HYDROCHLORIDE 50 MG/1
50 TABLET ORAL EVERY 6 HOURS PRN
Qty: 28 TABLET | Refills: 0 | Status: SHIPPED | OUTPATIENT
Start: 2022-06-10 | End: 2022-06-17

## 2022-06-10 RX ORDER — CEPHALEXIN 500 MG/1
500 TABLET ORAL 4 TIMES DAILY
Qty: 40 TABLET | Refills: 0 | Status: SHIPPED | OUTPATIENT
Start: 2022-06-10 | End: 2022-06-20

## 2022-06-10 RX ORDER — KETOROLAC TROMETHAMINE 30 MG/ML
60 INJECTION, SOLUTION INTRAMUSCULAR; INTRAVENOUS
Status: COMPLETED | OUTPATIENT
Start: 2022-06-10 | End: 2022-06-10

## 2022-06-10 RX ADMIN — KETOROLAC TROMETHAMINE 60 MG: 30 INJECTION, SOLUTION INTRAMUSCULAR; INTRAVENOUS at 09:06

## 2022-06-10 NOTE — PROGRESS NOTES
[facility]          Chief Complaint        Chief Complaint   Patient presents with    Right Eye Swollen    Headache    RIGHT BUTTOCK Pain     Possible Risen             History of Present Illness           Dai Velez is a 46 y.o. female with chronic conditions of HTN who presents today for multiple abscesses that have been present for 3 days.  Pt was seen in [facility] yesterday 6/9/22 and received clindamycin infusion and also a toradol shot.  Pt was also prescribed doxycycline which she has not started yet.  Pt states abscesses are not draining but very painful especially the one on her right eyelid.  She describes the pain as a 10/10.  Pt was not prescribed any pain medication upon dc from ER yesterday.            Past Medical History:     Past Medical History:   Diagnosis Date    HTN (hypertension)              Past Surgical History:      has no past surgical history on file.          Social History:     Social History     Tobacco Use    Smoking status: Current Some Day Smoker    Smokeless tobacco: Never Used   Substance Use Topics    Alcohol use: Yes    Drug use: Never             I personally reviewed all past medical, surgical, and social.           Review of Systems   Constitutional: Negative.    HENT: Negative.    Eyes: Negative.    Respiratory: Negative.    Cardiovascular: Negative.    Gastrointestinal: Negative.    Endocrine: Negative.    Genitourinary: Negative.    Musculoskeletal: Negative.    Skin: Positive for wound.        Multiple boils: left buttock, right neck, right upper eyelid   Allergic/Immunologic: Negative.    Neurological: Negative.    Psychiatric/Behavioral: Negative.               Medications:     Outpatient Encounter Medications as of 6/10/2022   Medication Sig Dispense Refill    amLODIPine-benazepriL (LOTREL) 10-40 mg per capsule Take 1 capsule by mouth once daily.      cephalexin (KEFTAB) 500 mg tablet Take 1 tablet (500 mg total) by mouth 4 (four) times daily. for 10  "days 40 tablet 0    doxycycline (VIBRAMYCIN) 100 MG Cap Take 1 capsule (100 mg total) by mouth 2 (two) times daily. for 10 days 20 capsule 0    ondansetron (ZOFRAN) 4 MG tablet Take 1 tablet (4 mg total) by mouth every 6 (six) hours. 12 tablet 0    pantoprazole (PROTONIX) 20 MG tablet Take 1 tablet (20 mg total) by mouth once daily. 30 tablet 0    promethazine (PHENERGAN) 25 MG suppository Place 1 suppository (25 mg total) rectally every 6 (six) hours as needed for Nausea. Use if unable to tolerate oral Phenergan. 12 suppository 0    promethazine (PHENERGAN) 25 MG tablet Take 1 tablet (25 mg total) by mouth every 6 (six) hours as needed for Nausea. 10 tablet 0    traMADoL (ULTRAM) 50 mg tablet Take 1 tablet (50 mg total) by mouth every 6 (six) hours as needed for Pain. 28 tablet 0     Facility-Administered Encounter Medications as of 6/10/2022   Medication Dose Route Frequency Provider Last Rate Last Admin    [COMPLETED] clindamycin in D5W 900 mg/50 mL IVPB 900 mg  900 mg Intravenous ED 1 Time HORTENSIA Mcnamara   Stopped at 06/09/22 2036    [COMPLETED] ketorolac injection 30 mg  30 mg Intravenous ED 1 Time HORTENSIA Mcnamara   30 mg at 06/09/22 2107    [COMPLETED] ketorolac injection 60 mg  60 mg Intramuscular 1 time in Clinic/HOD    60 mg at 06/10/22 0920             Allergies:     Review of patient's allergies indicates:  No Known Allergies          Health Maintenance:     Immunization History   Administered Date(s) Administered    Tdap 10/12/2021         Health Maintenance   Topic Date Due    Hepatitis C Screening  Never done    Lipid Panel  Never done    Mammogram  Never done    TETANUS VACCINE  10/12/2031              Physical Exam           Vital Signs  Temp: 98.5 °F (36.9 °C)  Temp src: Temporal  Pulse: 80  SpO2: 98 %  BP: (!) 148/92  BP Location: Left arm  Patient Position: Sitting  Height and Weight  Height: 5' 7" (170.2 cm)  Weight: 92.1 kg (203 lb)  BSA (Calculated - sq m): 2.09 sq " meters  BMI (Calculated): 31.8  Weight in (lb) to have BMI = 25: 159.3]          Physical Exam  Vitals and nursing note reviewed.   Constitutional:       General: She is not in acute distress.     Appearance: Normal appearance. She is not ill-appearing.   HENT:      Head: Normocephalic.      Right Ear: External ear normal.      Left Ear: External ear normal.      Nose: Nose normal. No congestion or rhinorrhea.      Mouth/Throat:      Mouth: Mucous membranes are moist.   Eyes:      Pupils: Pupils are equal, round, and reactive to light.   Cardiovascular:      Rate and Rhythm: Normal rate and regular rhythm.      Pulses: Normal pulses.      Heart sounds: Normal heart sounds. No murmur heard.    No friction rub. No gallop.   Pulmonary:      Effort: Pulmonary effort is normal. No respiratory distress.      Breath sounds: Normal breath sounds. No stridor. No wheezing, rhonchi or rales.   Abdominal:      General: There is no distension.      Palpations: Abdomen is soft.   Musculoskeletal:         General: Normal range of motion.      Cervical back: Normal range of motion and neck supple. No rigidity or tenderness.   Skin:     General: Skin is warm and dry.      Coloration: Skin is not jaundiced or pale.      Findings: Lesion present.             Comments: Right upper eyelid with abscess, swelling noted, very tender, pain is 10/10  Right anterior/lateral neck with abscess, swelling noted, ttp  Left upper buttock with abscess; ttp  None are draining at this time   Neurological:      General: No focal deficit present.      Mental Status: She is alert and oriented to person, place, and time. Mental status is at baseline.      Cranial Nerves: No cranial nerve deficit.      Sensory: No sensory deficit.      Motor: No weakness.      Coordination: Coordination normal.      Gait: Gait normal.   Psychiatric:         Mood and Affect: Mood normal.         Behavior: Behavior normal.         Thought Content: Thought content normal.          Judgment: Judgment normal.                Laboratory:     CBC:     Recent Labs   Lab 05/12/22  0944 05/13/22  0359 05/15/22  0457   WBC 7.53 5.78 4.99   RBC 4.68 4.59 4.59   Hemoglobin 13.2 12.9 12.8   Hematocrit 39.9 40.6 39.7   Platelet Count 162 176 192   MCV 85.3 88.5 86.5   MCH 28.2 28.1 27.9   MCHC 33.1 31.8 L 32.2        CMP:     Recent Labs   Lab 05/10/22  1651 05/11/22 2025 05/12/22  0728 05/13/22  0359 05/15/22  0457   Glucose 110 H 100 84   < > 94   Calcium 9.4 8.8 8.8   < > 9.4   Albumin 3.8 3.2 L 3.2 L  --   --    Total Protein 7.7 7.3 7.1  --   --    Sodium 141 138 139   < > 141   Potassium 3.3 L 2.9 L 3.5   < > 4.0   CO2 22 20 L 19 L   < > 22   Chloride 104 108 H 103   < > 104   BUN 9 5 L 7   < > 9   Alk Phos 74 73 72  --   --    ALT 24 26 32  --   --    AST 21 26 37  --   --    Bilirubin, Total 0.6 0.8 0.8  --   --     < > = values in this interval not displayed.        LIPIDS:     Recent Labs   Lab 05/13/22  0400   TSH 1.610        TSH:     Recent Labs   Lab 05/13/22  0400   TSH 1.610        A1C:                 Assessment/Plan          Dai Velez is a 46 y.o.female with:           1. Abscess, eyelid, right  - traMADoL (ULTRAM) 50 mg tablet; Take 1 tablet (50 mg total) by mouth every 6 (six) hours as needed for Pain.  Dispense: 28 tablet; Refill: 0  - Ambulatory referral/consult to Ophthalmology; Future  - cephalexin (KEFTAB) 500 mg tablet; Take 1 tablet (500 mg total) by mouth 4 (four) times daily. for 10 days  Dispense: 40 tablet; Refill: 0  - ketorolac injection 60 mg  - advised pt strongly to see ophthalmology if she is not seeing improvement in her eye within the next 5-7 days as she may need an I&D  -due to high pain level, let's treat with tramadol over the next week; monitor  -add keflex to doxycycline to expand microbial coverage    2. Abscess, neck  - Ambulatory referral/consult to General Surgery; Future  - cephalexin (KEFTAB) 500 mg tablet; Take 1 tablet (500 mg total) by mouth 4  (four) times daily. for 10 days  Dispense: 40 tablet; Refill: 0  -advised pt strongly that if she does not see improvement in the neck and/or buttock abscesses within the next 5-7 days she needs gen. Surg. To I&D    3. Abscess of left buttock  - Ambulatory referral/consult to General Surgery; Future  - cephalexin (KEFTAB) 500 mg tablet; Take 1 tablet (500 mg total) by mouth 4 (four) times daily. for 10 days  Dispense: 40 tablet; Refill: 0             Total time spent face-to-face and non-face-to-face coordinating care for this encounter was: 30 min          Chronic conditions status updated as per HPI.  Other than changes above, cont current medications and maintain follow up with specialists.  Return to clinic PRN.          LOUISE Rapp     [facility]

## 2022-06-10 NOTE — ED PROVIDER NOTES
Encounter Date: 6/9/2022       History     Chief Complaint   Patient presents with    Abscess     Some noted to face and bottom x2 days, states one on face is draining     46 year old female presents to ED with complaint of abscesses to face and buttock. She states she may have been bitten, but is unsure of what bit her. She states 2 days of symptoms, with abscess to face draining intermittently. She has recently shaved. Right eyelid abscess the most painful.     The history is provided by the patient. No  was used.   Abscess   This is a new problem. The current episode started two days ago. The problem has been gradually worsening. The abscess is present on the face and left buttock. The pain is at a severity of 10/10. The abscess is characterized by redness and painfulness. Pertinent negatives include no fever, no vomiting and no cough. There were no sick contacts.     Review of patient's allergies indicates:  No Known Allergies  Past Medical History:   Diagnosis Date    HTN (hypertension)      History reviewed. No pertinent surgical history.  History reviewed. No pertinent family history.  Social History     Tobacco Use    Smoking status: Current Some Day Smoker    Smokeless tobacco: Never Used   Substance Use Topics    Alcohol use: Yes    Drug use: Never     Review of Systems   Constitutional: Negative for chills and fever.   HENT: Negative for sinus pressure and sinus pain.    Eyes: Negative for photophobia and visual disturbance.   Respiratory: Negative for cough and shortness of breath.    Cardiovascular: Negative for chest pain and palpitations.   Gastrointestinal: Negative for vomiting.   Endocrine: Negative for cold intolerance and heat intolerance.   Genitourinary: Negative for decreased urine volume and difficulty urinating.   Musculoskeletal: Negative for arthralgias and gait problem.   Skin: Positive for wound. Negative for color change.   Allergic/Immunologic: Negative for  environmental allergies and food allergies.   Neurological: Negative for dizziness and facial asymmetry.   Hematological: Negative for adenopathy. Does not bruise/bleed easily.   Psychiatric/Behavioral: Negative for agitation and confusion.       Physical Exam     Initial Vitals [06/09/22 1823]   BP Pulse Resp Temp SpO2   (!) 179/118 82 17 99.3 °F (37.4 °C) 99 %      MAP       --         Physical Exam    Nursing note and vitals reviewed.  Constitutional: She appears well-developed and well-nourished.   HENT:   Head: Normocephalic and atraumatic.   Eyes: EOM are normal. Pupils are equal, round, and reactive to light.   Neck:   Normal range of motion.  Cardiovascular: Normal rate and regular rhythm.   Pulmonary/Chest: Breath sounds normal. She has no wheezes. She has no rhonchi.   Abdominal: Abdomen is soft. She exhibits no distension. There is no abdominal tenderness.   Musculoskeletal:         General: No tenderness or edema. Normal range of motion.      Cervical back: Normal range of motion.     Neurological: She is alert and oriented to person, place, and time.   Skin: Skin is warm and dry. Capillary refill takes 2 to 3 seconds. Rash noted. Rash is nodular.        Psychiatric: She has a normal mood and affect. Thought content normal.         Medical Screening Exam   See Full Note    ED Course   Procedures  Labs Reviewed - No data to display       Imaging Results    None          Medications   clindamycin in D5W 900 mg/50 mL IVPB 900 mg (0 mg Intravenous Stopped 6/9/22 2036)   ketorolac injection 30 mg (30 mg Intravenous Given 6/9/22 2107)                       Clinical Impression:   Final diagnoses:  [L02.91] Abscess  [H00.031] Abscess of right upper eyelid (Primary)          ED Disposition Condition    Discharge Stable        ED Prescriptions     Medication Sig Dispense Start Date End Date Auth. Provider    doxycycline (VIBRAMYCIN) 100 MG Cap Take 1 capsule (100 mg total) by mouth 2 (two) times daily. for 10 days  20 capsule 6/9/2022 6/19/2022 Shana Farfan, HORTENSIA        Follow-up Information    None          Shana Farfan, HORTENSIA  06/10/22 1217

## 2022-06-14 ENCOUNTER — OFFICE VISIT (OUTPATIENT)
Dept: SURGERY | Facility: CLINIC | Age: 47
End: 2022-06-14
Attending: SURGERY
Payer: MEDICAID

## 2022-06-14 VITALS — HEIGHT: 67 IN | WEIGHT: 203 LBS | BODY MASS INDEX: 31.86 KG/M2

## 2022-06-14 DIAGNOSIS — L02.11 ABSCESS, NECK: ICD-10-CM

## 2022-06-14 DIAGNOSIS — L02.31 ABSCESS OF LEFT BUTTOCK: ICD-10-CM

## 2022-06-14 DIAGNOSIS — H00.033 ABSCESS, EYELID, RIGHT: Primary | ICD-10-CM

## 2022-06-14 PROCEDURE — 1159F PR MEDICATION LIST DOCUMENTED IN MEDICAL RECORD: ICD-10-PCS | Mod: CPTII,,, | Performed by: SURGERY

## 2022-06-14 PROCEDURE — 10061 I&D ABSCESS COMP/MULTIPLE: CPT | Mod: S$PBB,,, | Performed by: SURGERY

## 2022-06-14 PROCEDURE — 4010F PR ACE/ARB THEARPY RXD/TAKEN: ICD-10-PCS | Mod: CPTII,,, | Performed by: SURGERY

## 2022-06-14 PROCEDURE — 99499 UNLISTED E&M SERVICE: CPT | Mod: S$PBB,,, | Performed by: SURGERY

## 2022-06-14 PROCEDURE — 10061 INCISION & DRAINAGE: ICD-10-PCS | Mod: S$PBB,,, | Performed by: SURGERY

## 2022-06-14 PROCEDURE — 3008F PR BODY MASS INDEX (BMI) DOCUMENTED: ICD-10-PCS | Mod: CPTII,,, | Performed by: SURGERY

## 2022-06-14 PROCEDURE — 4010F ACE/ARB THERAPY RXD/TAKEN: CPT | Mod: CPTII,,, | Performed by: SURGERY

## 2022-06-14 PROCEDURE — 1159F MED LIST DOCD IN RCRD: CPT | Mod: CPTII,,, | Performed by: SURGERY

## 2022-06-14 PROCEDURE — 3008F BODY MASS INDEX DOCD: CPT | Mod: CPTII,,, | Performed by: SURGERY

## 2022-06-14 PROCEDURE — 67700 BLEPHAROTOMY DRG ABSC EYELID: CPT | Mod: PBBFAC | Performed by: SURGERY

## 2022-06-14 PROCEDURE — 99499 NO LOS: ICD-10-PCS | Mod: S$PBB,,, | Performed by: SURGERY

## 2022-06-14 PROCEDURE — 67700 INCISION & DRAINAGE: ICD-10-PCS | Mod: S$PBB,51,RT, | Performed by: SURGERY

## 2022-06-14 PROCEDURE — 99213 OFFICE O/P EST LOW 20 MIN: CPT | Mod: PBBFAC | Performed by: SURGERY

## 2022-06-21 PROBLEM — H00.033: Status: ACTIVE | Noted: 2022-06-21

## 2022-06-21 NOTE — PROCEDURES
"Incision & Drainage    Date/Time: 6/14/2022 9:15 AM  Performed by: Thiago Bernal MD  Authorized by: Thiago Bernal MD     Time out: Immediately prior to procedure a "time out" was called to verify the correct patient, procedure, equipment, support staff and site/side marked as required.      Type:  Abscess  Body area:  Head/neck  Location details:  Right eyelid  Anesthesia:  Local infiltration  Local anesthetic: lidocaine 1% without epinephrine  Scalpel size:  11  Incision type:  Single straight  Incision depth: dermal    Complexity:  Simple  Drainage:  Purulent  Drainage amount:  Moderate  Wound treatment:  Incision and drainage (loose closure with chromic)  Patient tolerance:  Patient tolerated the procedure well with no immediate complications    cultures  Incision & Drainage    Date/Time: 6/14/2022 9:15 AM  Performed by: Thiago Bernal MD  Authorized by: Thiago Bernal MD     Time out: Immediately prior to procedure a "time out" was called to verify the correct patient, procedure, equipment, support staff and site/side marked as required.      Type:  Abscess  Body area:  Head/neck  Location details:  Neck  Anesthesia:  Local infiltration  Local anesthetic: lidocaine 1% without epinephrine  Scalpel size:  11  Incision type:  Single straight  Incision depth: dermal    Complexity:  Simple  Drainage:  Purulent  Drainage amount:  Scant  Wound treatment:  Incision and wound left open  Packing material:  Wick placed  Patient tolerance:  Patient tolerated the procedure well with no immediate complications  Incision & Drainage    Date/Time: 6/14/2022 9:15 AM  Performed by: Thiago Bernal MD  Authorized by: Thiago Bernal MD     Time out: Immediately prior to procedure a "time out" was called to verify the correct patient, procedure, equipment, support staff and site/side marked as required.      Type:  Abscess  Body area:  Anogenital  Location details:  Gluteal cleft  Anesthesia:  Local infiltration  Local anesthetic: " lidocaine 1% without epinephrine  Scalpel size:  11  Incision type:  Single straight  Incision depth: dermal    Complexity:  Simple  Drainage:  Purulent  Drainage amount:  Scant  Wound treatment:  Incision and drainage  Packing material:  Wick placed  Patient tolerance:  Patient tolerated the procedure well with no immediate complications

## 2022-06-30 ENCOUNTER — OFFICE VISIT (OUTPATIENT)
Dept: FAMILY MEDICINE | Facility: CLINIC | Age: 47
End: 2022-06-30
Payer: MEDICAID

## 2022-06-30 VITALS
BODY MASS INDEX: 31.86 KG/M2 | HEART RATE: 71 BPM | OXYGEN SATURATION: 99 % | DIASTOLIC BLOOD PRESSURE: 84 MMHG | WEIGHT: 203 LBS | SYSTOLIC BLOOD PRESSURE: 140 MMHG | HEIGHT: 67 IN | TEMPERATURE: 98 F

## 2022-06-30 DIAGNOSIS — H00.033 ABSCESS, EYELID, RIGHT: ICD-10-CM

## 2022-06-30 DIAGNOSIS — L02.31 ABSCESS OF LEFT BUTTOCK: Primary | ICD-10-CM

## 2022-06-30 DIAGNOSIS — L02.11 ABSCESS, NECK: ICD-10-CM

## 2022-06-30 PROCEDURE — 3077F PR MOST RECENT SYSTOLIC BLOOD PRESSURE >= 140 MM HG: ICD-10-PCS | Mod: CPTII,,, | Performed by: NURSE PRACTITIONER

## 2022-06-30 PROCEDURE — 4010F PR ACE/ARB THEARPY RXD/TAKEN: ICD-10-PCS | Mod: CPTII,,, | Performed by: NURSE PRACTITIONER

## 2022-06-30 PROCEDURE — 99213 PR OFFICE/OUTPT VISIT, EST, LEVL III, 20-29 MIN: ICD-10-PCS | Mod: ,,, | Performed by: NURSE PRACTITIONER

## 2022-06-30 PROCEDURE — 1160F PR REVIEW ALL MEDS BY PRESCRIBER/CLIN PHARMACIST DOCUMENTED: ICD-10-PCS | Mod: CPTII,,, | Performed by: NURSE PRACTITIONER

## 2022-06-30 PROCEDURE — 4010F ACE/ARB THERAPY RXD/TAKEN: CPT | Mod: CPTII,,, | Performed by: NURSE PRACTITIONER

## 2022-06-30 PROCEDURE — 3077F SYST BP >= 140 MM HG: CPT | Mod: CPTII,,, | Performed by: NURSE PRACTITIONER

## 2022-06-30 PROCEDURE — 1159F MED LIST DOCD IN RCRD: CPT | Mod: CPTII,,, | Performed by: NURSE PRACTITIONER

## 2022-06-30 PROCEDURE — 3079F DIAST BP 80-89 MM HG: CPT | Mod: CPTII,,, | Performed by: NURSE PRACTITIONER

## 2022-06-30 PROCEDURE — 3008F BODY MASS INDEX DOCD: CPT | Mod: CPTII,,, | Performed by: NURSE PRACTITIONER

## 2022-06-30 PROCEDURE — 3079F PR MOST RECENT DIASTOLIC BLOOD PRESSURE 80-89 MM HG: ICD-10-PCS | Mod: CPTII,,, | Performed by: NURSE PRACTITIONER

## 2022-06-30 PROCEDURE — 3008F PR BODY MASS INDEX (BMI) DOCUMENTED: ICD-10-PCS | Mod: CPTII,,, | Performed by: NURSE PRACTITIONER

## 2022-06-30 PROCEDURE — 1160F RVW MEDS BY RX/DR IN RCRD: CPT | Mod: CPTII,,, | Performed by: NURSE PRACTITIONER

## 2022-06-30 PROCEDURE — 99213 OFFICE O/P EST LOW 20 MIN: CPT | Mod: ,,, | Performed by: NURSE PRACTITIONER

## 2022-06-30 PROCEDURE — 1159F PR MEDICATION LIST DOCUMENTED IN MEDICAL RECORD: ICD-10-PCS | Mod: CPTII,,, | Performed by: NURSE PRACTITIONER

## 2022-06-30 NOTE — PROGRESS NOTES
[facility]          Chief Complaint        Chief Complaint   Patient presents with    Follow-up             History of Present Illness           Dai Velez is a 46 y.o. female with chronic conditions of HTN who presents today for follow up on I&D.  Pt saw Dr. Bernal on 22 for I&D of three abscesses located on right eyelid, left buttock, and right side of neck.  Pt has no complaints at this time.            Past Medical History:     Past Medical History:   Diagnosis Date    HTN (hypertension)              Past Surgical History:      has a past surgical history that includes  section.          Social History:     Social History     Tobacco Use    Smoking status: Current Some Day Smoker    Smokeless tobacco: Never Used   Substance Use Topics    Alcohol use: Yes    Drug use: Never             I personally reviewed all past medical, surgical, and social.           Review of Systems   Constitutional: Negative.    HENT: Negative.    Eyes: Negative.    Respiratory: Negative.    Cardiovascular: Negative.    Gastrointestinal: Negative.    Endocrine: Negative.    Genitourinary: Negative.    Musculoskeletal: Negative.    Skin: Negative.    Allergic/Immunologic: Negative.    Neurological: Negative.    Psychiatric/Behavioral: Negative.               Medications:     Outpatient Encounter Medications as of 2022   Medication Sig Dispense Refill    amLODIPine-benazepriL (LOTREL) 10-40 mg per capsule Take 1 capsule by mouth once daily.      ondansetron (ZOFRAN) 4 MG tablet Take 1 tablet (4 mg total) by mouth every 6 (six) hours. 12 tablet 0    pantoprazole (PROTONIX) 20 MG tablet Take 1 tablet (20 mg total) by mouth once daily. 30 tablet 0    promethazine (PHENERGAN) 25 MG suppository Place 1 suppository (25 mg total) rectally every 6 (six) hours as needed for Nausea. Use if unable to tolerate oral Phenergan. 12 suppository 0    promethazine (PHENERGAN) 25 MG tablet Take 1 tablet (25 mg total) by  "mouth every 6 (six) hours as needed for Nausea. 10 tablet 0     No facility-administered encounter medications on file as of 6/30/2022.             Allergies:     Review of patient's allergies indicates:  No Known Allergies          Health Maintenance:     Immunization History   Administered Date(s) Administered    Tdap 10/12/2021         Health Maintenance   Topic Date Due    Hepatitis C Screening  Never done    Lipid Panel  Never done    Mammogram  Never done    TETANUS VACCINE  10/12/2031              Physical Exam           Vital Signs  Temp: 98.4 °F (36.9 °C)  Temp src: Oral  Pulse: 71  SpO2: 99 %  BP: (!) 140/84  BP Location: Right arm  Patient Position: Sitting  Height and Weight  Height: 5' 7" (170.2 cm)  Weight: 92.1 kg (203 lb)  BSA (Calculated - sq m): 2.09 sq meters  BMI (Calculated): 31.8  Weight in (lb) to have BMI = 25: 159.3]          Physical Exam  Constitutional:       General: She is not in acute distress.     Appearance: Normal appearance. She is obese. She is not ill-appearing.   HENT:      Head: Normocephalic.      Right Ear: External ear normal.      Left Ear: External ear normal.      Nose: Nose normal.   Eyes:      Conjunctiva/sclera: Conjunctivae normal.   Cardiovascular:      Rate and Rhythm: Normal rate.   Pulmonary:      Effort: Pulmonary effort is normal. No respiratory distress.   Abdominal:      General: There is no distension.   Musculoskeletal:         General: Normal range of motion.      Cervical back: Normal range of motion and neck supple. No tenderness.   Skin:     General: Skin is warm.      Coloration: Skin is not pale.      Findings: No erythema.   Neurological:      Mental Status: She is alert.   Psychiatric:         Mood and Affect: Mood normal.         Behavior: Behavior normal.         Thought Content: Thought content normal.         Judgment: Judgment normal.                Laboratory:     CBC:     Recent Labs   Lab 05/12/22  0944 05/13/22  0359 05/15/22  0457 "   WBC 7.53 5.78 4.99   RBC 4.68 4.59 4.59   Hemoglobin 13.2 12.9 12.8   Hematocrit 39.9 40.6 39.7   Platelet Count 162 176 192   MCV 85.3 88.5 86.5   MCH 28.2 28.1 27.9   MCHC 33.1 31.8 L 32.2        CMP:     Recent Labs   Lab 05/10/22  1651 05/11/22  2025 05/12/22  0728 05/13/22  0359 05/15/22  0457   Glucose 110 H 100 84   < > 94   Calcium 9.4 8.8 8.8   < > 9.4   Albumin 3.8 3.2 L 3.2 L  --   --    Total Protein 7.7 7.3 7.1  --   --    Sodium 141 138 139   < > 141   Potassium 3.3 L 2.9 L 3.5   < > 4.0   CO2 22 20 L 19 L   < > 22   Chloride 104 108 H 103   < > 104   BUN 9 5 L 7   < > 9   Alk Phos 74 73 72  --   --    ALT 24 26 32  --   --    AST 21 26 37  --   --    Bilirubin, Total 0.6 0.8 0.8  --   --     < > = values in this interval not displayed.        LIPIDS:     Recent Labs   Lab 05/13/22  0400   TSH 1.610        TSH:     Recent Labs   Lab 05/13/22  0400   TSH 1.610        A1C:                 Assessment/Plan          Dai Velez is a 46 y.o.female with:           1. Abscess of left buttock    2. Abscess, eyelid, right    3. Abscess, neck  -all abscesses were I&D'd by Dr. Bernal; pt tolerated them well and is doing well today, no complications; healing well; monitor           Total time spent face-to-face and non-face-to-face coordinating care for this encounter was: 20 min          Chronic conditions status updated as per HPI.  Other than changes above, cont current medications and maintain follow up with specialists.  Return to clinic PRN.          LOUISE Rapp     [facility]

## 2022-07-02 ENCOUNTER — HOSPITAL ENCOUNTER (EMERGENCY)
Facility: HOSPITAL | Age: 47
Discharge: HOME | End: 2022-07-02
Attending: EMERGENCY MEDICINE
Payer: MEDICAID

## 2022-07-02 VITALS
HEART RATE: 81 BPM | SYSTOLIC BLOOD PRESSURE: 174 MMHG | RESPIRATION RATE: 16 BRPM | HEIGHT: 67 IN | TEMPERATURE: 99 F | WEIGHT: 203 LBS | BODY MASS INDEX: 31.86 KG/M2 | OXYGEN SATURATION: 96 % | DIASTOLIC BLOOD PRESSURE: 117 MMHG

## 2022-07-02 DIAGNOSIS — R00.0 TACHYCARDIA: ICD-10-CM

## 2022-07-02 DIAGNOSIS — N30.01 ACUTE CYSTITIS WITH HEMATURIA: ICD-10-CM

## 2022-07-02 DIAGNOSIS — F12.10 MARIJUANA ABUSE, CONTINUOUS: Primary | ICD-10-CM

## 2022-07-02 LAB
ALBUMIN SERPL BCP-MCNC: 3.8 G/DL (ref 3.5–5)
ALBUMIN/GLOB SERPL: 0.9 {RATIO}
ALP SERPL-CCNC: 75 U/L (ref 39–100)
ALT SERPL W P-5'-P-CCNC: 29 U/L (ref 13–56)
AMPHET UR QL SCN: NEGATIVE
ANION GAP SERPL CALCULATED.3IONS-SCNC: 18 MMOL/L (ref 7–16)
AST SERPL W P-5'-P-CCNC: 27 U/L (ref 15–37)
B-HCG UR QL: NEGATIVE
BACTERIA #/AREA URNS HPF: ABNORMAL /HPF
BARBITURATES UR QL SCN: NEGATIVE
BASOPHILS # BLD AUTO: 0.07 K/UL (ref 0–0.2)
BASOPHILS NFR BLD AUTO: 0.8 % (ref 0–1)
BENZODIAZ METAB UR QL SCN: NEGATIVE
BILIRUB SERPL-MCNC: 0.4 MG/DL (ref 0–1.2)
BILIRUB UR QL STRIP: NEGATIVE
BUN SERPL-MCNC: 11 MG/DL (ref 7–18)
BUN/CREAT SERPL: 13 (ref 6–20)
CALCIUM SERPL-MCNC: 9.1 MG/DL (ref 8.5–10.1)
CANNABINOIDS UR QL SCN: POSITIVE
CHLORIDE SERPL-SCNC: 108 MMOL/L (ref 98–107)
CLARITY UR: CLEAR
CO2 SERPL-SCNC: 24 MMOL/L (ref 21–32)
COCAINE UR QL SCN: NEGATIVE
COLOR UR: YELLOW
CREAT SERPL-MCNC: 0.86 MG/DL (ref 0.55–1.02)
CTP QC/QA: YES
DIFFERENTIAL METHOD BLD: ABNORMAL
EOSINOPHIL # BLD AUTO: 0.1 K/UL (ref 0–0.5)
EOSINOPHIL NFR BLD AUTO: 1.2 % (ref 1–4)
EOSINOPHIL NFR BLD MANUAL: 3 % (ref 1–4)
ERYTHROCYTE [DISTWIDTH] IN BLOOD BY AUTOMATED COUNT: 14.5 % (ref 11.5–14.5)
GLOBULIN SER-MCNC: 4.2 G/DL (ref 2–4)
GLUCOSE SERPL-MCNC: 136 MG/DL (ref 74–106)
GLUCOSE UR STRIP-MCNC: NEGATIVE MG/DL
HCO3 UR-SCNC: 29 MMOL/L (ref 24–28)
HCT VFR BLD AUTO: 43.1 % (ref 38–47)
HCT VFR BLD CALC: 46 % (ref 35–51)
HGB BLD-MCNC: 14.1 G/DL (ref 12–16)
IMM GRANULOCYTES # BLD AUTO: 0.06 K/UL (ref 0–0.04)
IMM GRANULOCYTES NFR BLD: 0.7 % (ref 0–0.4)
KETONES UR STRIP-SCNC: NEGATIVE MG/DL
LDH SERPL L TO P-CCNC: 2.7 MMOL/L (ref 0.3–1.2)
LEUKOCYTE ESTERASE UR QL STRIP: ABNORMAL
LIPASE SERPL-CCNC: 281 U/L (ref 73–393)
LYMPHOCYTES # BLD AUTO: 2.3 K/UL (ref 1–4.8)
LYMPHOCYTES NFR BLD AUTO: 26.7 % (ref 27–41)
LYMPHOCYTES NFR BLD MANUAL: 32 % (ref 27–41)
MAGNESIUM SERPL-MCNC: 2 MG/DL (ref 1.7–2.3)
MCH RBC QN AUTO: 27.9 PG (ref 27–31)
MCHC RBC AUTO-ENTMCNC: 32.7 G/DL (ref 32–36)
MCV RBC AUTO: 85.2 FL (ref 80–96)
MONOCYTES # BLD AUTO: 0.66 K/UL (ref 0–0.8)
MONOCYTES NFR BLD AUTO: 7.7 % (ref 2–6)
MONOCYTES NFR BLD MANUAL: 4 % (ref 2–6)
MPC BLD CALC-MCNC: 12 FL (ref 9.4–12.4)
NEUTROPHILS # BLD AUTO: 5.43 K/UL (ref 1.8–7.7)
NEUTROPHILS NFR BLD AUTO: 62.9 % (ref 53–65)
NEUTS SEG NFR BLD MANUAL: 61 % (ref 50–62)
NITRITE UR QL STRIP: NEGATIVE
NRBC # BLD AUTO: 0 X10E3/UL
NRBC, AUTO (.00): 0 %
OPIATES UR QL SCN: NEGATIVE
PCO2 BLDA: 38 MMHG (ref 41–51)
PCP UR QL SCN: NEGATIVE
PH SMN: 7.49 [PH] (ref 7.32–7.42)
PH UR STRIP: 8 PH UNITS
PLATELET # BLD AUTO: 243 K/UL (ref 150–400)
PLATELET MORPHOLOGY: ABNORMAL
PO2 BLDA: 64 MMHG (ref 25–40)
POC BASE EXCESS: 5.4 MMOL/L (ref -2–3)
POC CO2: 30.2 MMOL/L
POC IONIZED CALCIUM: 1.12 MMOL/L (ref 1.15–1.35)
POC SATURATED O2: 94 % (ref 40–70)
POCT GLUCOSE: 131 MG/DL (ref 60–95)
POTASSIUM BLD-SCNC: 4.2 MMOL/L (ref 3.4–4.5)
POTASSIUM SERPL-SCNC: 4.2 MMOL/L (ref 3.5–5.1)
PROT SERPL-MCNC: 8 G/DL (ref 6.4–8.2)
PROT UR QL STRIP: NEGATIVE
RBC # BLD AUTO: 5.06 M/UL (ref 4.2–5.4)
RBC # UR STRIP: ABNORMAL /UL
RBC #/AREA URNS HPF: ABNORMAL /HPF
RBC MORPH BLD: NORMAL
SARS-COV-2 RDRP RESP QL NAA+PROBE: NEGATIVE
SODIUM BLD-SCNC: 142 MMOL/L (ref 136–145)
SODIUM SERPL-SCNC: 146 MMOL/L (ref 136–145)
SP GR UR STRIP: 1.01
SQUAMOUS #/AREA URNS LPF: ABNORMAL /LPF
UROBILINOGEN UR STRIP-ACNC: 0.2 MG/DL
WBC # BLD AUTO: 8.62 K/UL (ref 4.5–11)
WBC #/AREA URNS HPF: ABNORMAL /HPF
WBC CLUMPS, UA: ABNORMAL /HPF

## 2022-07-02 PROCEDURE — 82330 ASSAY OF CALCIUM: CPT

## 2022-07-02 PROCEDURE — 87086 URINE CULTURE/COLONY COUNT: CPT | Performed by: EMERGENCY MEDICINE

## 2022-07-02 PROCEDURE — 83605 ASSAY OF LACTIC ACID: CPT

## 2022-07-02 PROCEDURE — 81025 URINE PREGNANCY TEST: CPT | Performed by: EMERGENCY MEDICINE

## 2022-07-02 PROCEDURE — 93005 ELECTROCARDIOGRAM TRACING: CPT

## 2022-07-02 PROCEDURE — 96372 THER/PROPH/DIAG INJ SC/IM: CPT | Mod: 59

## 2022-07-02 PROCEDURE — 82947 ASSAY GLUCOSE BLOOD QUANT: CPT

## 2022-07-02 PROCEDURE — 85014 HEMATOCRIT: CPT | Mod: 59

## 2022-07-02 PROCEDURE — 84132 ASSAY OF SERUM POTASSIUM: CPT

## 2022-07-02 PROCEDURE — 99283 PR EMERGENCY DEPT VISIT,LEVEL III: ICD-10-PCS | Mod: ,,, | Performed by: FAMILY MEDICINE

## 2022-07-02 PROCEDURE — 93010 ELECTROCARDIOGRAM REPORT: CPT | Mod: ,,, | Performed by: HOSPITALIST

## 2022-07-02 PROCEDURE — 83735 ASSAY OF MAGNESIUM: CPT | Performed by: EMERGENCY MEDICINE

## 2022-07-02 PROCEDURE — 93010 EKG 12-LEAD: ICD-10-PCS | Mod: ,,, | Performed by: HOSPITALIST

## 2022-07-02 PROCEDURE — 80307 DRUG TEST PRSMV CHEM ANLYZR: CPT | Performed by: EMERGENCY MEDICINE

## 2022-07-02 PROCEDURE — 84295 ASSAY OF SERUM SODIUM: CPT

## 2022-07-02 PROCEDURE — 87077 CULTURE AEROBIC IDENTIFY: CPT | Performed by: EMERGENCY MEDICINE

## 2022-07-02 PROCEDURE — 85025 COMPLETE CBC W/AUTO DIFF WBC: CPT | Performed by: EMERGENCY MEDICINE

## 2022-07-02 PROCEDURE — 63600175 PHARM REV CODE 636 W HCPCS: Performed by: FAMILY MEDICINE

## 2022-07-02 PROCEDURE — 99283 EMERGENCY DEPT VISIT LOW MDM: CPT | Mod: ,,, | Performed by: FAMILY MEDICINE

## 2022-07-02 PROCEDURE — 80053 COMPREHEN METABOLIC PANEL: CPT | Performed by: EMERGENCY MEDICINE

## 2022-07-02 PROCEDURE — 82803 BLOOD GASES ANY COMBINATION: CPT

## 2022-07-02 PROCEDURE — 25000003 PHARM REV CODE 250: Performed by: FAMILY MEDICINE

## 2022-07-02 PROCEDURE — 63600175 PHARM REV CODE 636 W HCPCS: Performed by: EMERGENCY MEDICINE

## 2022-07-02 PROCEDURE — 99284 EMERGENCY DEPT VISIT MOD MDM: CPT | Mod: 25

## 2022-07-02 PROCEDURE — 96365 THER/PROPH/DIAG IV INF INIT: CPT

## 2022-07-02 PROCEDURE — 96374 THER/PROPH/DIAG INJ IV PUSH: CPT | Mod: 59

## 2022-07-02 PROCEDURE — 83690 ASSAY OF LIPASE: CPT | Performed by: EMERGENCY MEDICINE

## 2022-07-02 PROCEDURE — 81001 URINALYSIS AUTO W/SCOPE: CPT | Mod: 59 | Performed by: EMERGENCY MEDICINE

## 2022-07-02 PROCEDURE — 87635 SARS-COV-2 COVID-19 AMP PRB: CPT | Performed by: EMERGENCY MEDICINE

## 2022-07-02 PROCEDURE — 36415 COLL VENOUS BLD VENIPUNCTURE: CPT | Performed by: EMERGENCY MEDICINE

## 2022-07-02 RX ORDER — PROMETHAZINE HYDROCHLORIDE 25 MG/ML
12.5 INJECTION, SOLUTION INTRAMUSCULAR; INTRAVENOUS
Status: COMPLETED | OUTPATIENT
Start: 2022-07-02 | End: 2022-07-02

## 2022-07-02 RX ORDER — ONDANSETRON 2 MG/ML
4 INJECTION INTRAMUSCULAR; INTRAVENOUS
Status: COMPLETED | OUTPATIENT
Start: 2022-07-02 | End: 2022-07-02

## 2022-07-02 RX ORDER — ONDANSETRON 4 MG/1
4 TABLET, FILM COATED ORAL EVERY 6 HOURS
Qty: 12 TABLET | Refills: 0 | Status: SHIPPED | OUTPATIENT
Start: 2022-07-02 | End: 2022-07-11

## 2022-07-02 RX ORDER — CIPROFLOXACIN 500 MG/1
500 TABLET ORAL 2 TIMES DAILY
Qty: 20 TABLET | Refills: 0 | Status: SHIPPED | OUTPATIENT
Start: 2022-07-02 | End: 2022-07-11

## 2022-07-02 RX ADMIN — ONDANSETRON 4 MG: 2 INJECTION INTRAMUSCULAR; INTRAVENOUS at 05:07

## 2022-07-02 RX ADMIN — CEFTRIAXONE SODIUM 1 G: 1 INJECTION, POWDER, FOR SOLUTION INTRAMUSCULAR; INTRAVENOUS at 06:07

## 2022-07-02 RX ADMIN — PROMETHAZINE HYDROCHLORIDE 12.5 MG: 25 INJECTION INTRAMUSCULAR; INTRAVENOUS at 06:07

## 2022-07-02 NOTE — ED PROVIDER NOTES
Encounter Date: 2022       History     Chief Complaint   Patient presents with    Vomiting     45 Y/O FEMALE WITH NAUSEA AND VOMITING WHICH STARTED ABOUT AN HOUR PRIOR TO ARRIVAL.  SYMPTOMS ARE MODERATE.  SHE NOTES NO REMITTING OR EXACERBATING FACTORS.          Review of patient's allergies indicates:   Allergen Reactions    Penicillins      Past Medical History:   Diagnosis Date    HTN (hypertension)      Past Surgical History:   Procedure Laterality Date     SECTION       No family history on file.  Social History     Tobacco Use    Smoking status: Current Some Day Smoker    Smokeless tobacco: Never Used   Substance Use Topics    Alcohol use: Yes    Drug use: Never     Review of Systems   All other systems reviewed and are negative.      Physical Exam     Initial Vitals [22 0530]   BP Pulse Resp Temp SpO2   (!) 209/123 88 (!) 24 99.1 °F (37.3 °C) 100 %      MAP       --         Physical Exam    Nursing note and vitals reviewed.  Constitutional: She appears well-developed and well-nourished.   HENT:   Head: Normocephalic and atraumatic.   Nose: Nose normal.   Mouth/Throat: Oropharynx is clear and moist.   Eyes: Conjunctivae and EOM are normal. Pupils are equal, round, and reactive to light.   Neck: Neck supple.   Normal range of motion.  Cardiovascular: Normal rate, regular rhythm, normal heart sounds and intact distal pulses.   Pulmonary/Chest: Breath sounds normal.   Abdominal: Abdomen is soft. Bowel sounds are normal.   Musculoskeletal:         General: Normal range of motion.      Cervical back: Normal range of motion and neck supple.     Neurological: She is alert and oriented to person, place, and time. She has normal strength. GCS score is 15. GCS eye subscore is 4. GCS verbal subscore is 5. GCS motor subscore is 6.   Skin: Skin is warm and dry. Capillary refill takes less than 2 seconds.   Psychiatric: She has a normal mood and affect. Thought content normal.         Medical  Screening Exam   See Full Note    ED Course   Procedures  Labs Reviewed   COMPREHENSIVE METABOLIC PANEL - Abnormal; Notable for the following components:       Result Value    Sodium 146 (*)     Chloride 108 (*)     Anion Gap 18 (*)     Glucose 136 (*)     Globulin 4.2 (*)     All other components within normal limits   DRUG SCREEN, URINE (BEAKER) - Abnormal; Notable for the following components:    Cannabinoid, Urine Positive (*)     All other components within normal limits    Narrative:     The results of screening tests should be considered presumptive. Confirmatory testing is available upon request.    Cutoff Points:  PCP:         25ng/mL  AMPH:        500ng/mL  LOBO:        200ng/mL  DARYN:        200ng/mL  THC:         50ng/mL  DEMETRIA:         300ng/mL  OPI:         2000ng/mL   CBC WITH DIFFERENTIAL - Abnormal; Notable for the following components:    Lymphocytes % 26.7 (*)     Monocytes % 7.7 (*)     Immature Granulocytes % 0.7 (*)     Immature Granulocytes, Absolute 0.06 (*)     All other components within normal limits   MANUAL DIFFERENTIAL - Abnormal; Notable for the following components:    Platelet Morphology Few Large Platelets (*)     All other components within normal limits   URINALYSIS, REFLEX TO URINE CULTURE - Abnormal; Notable for the following components:    Leukocytes, UA Large (*)     Blood, UA Trace-Intact (*)     All other components within normal limits   URINALYSIS, MICROSCOPIC - Abnormal; Notable for the following components:    WBC, UA 15-25 (*)     RBC, UA 3-5 (*)     Bacteria, UA Few (*)     Squamous Epithelial Cells, UA Few (*)     WBC Clumps Few (*)     All other components within normal limits   MAGNESIUM - Normal   SARS-COV-2 RNA AMPLIFICATION, QUAL - Normal    Narrative:     Negative SARS-CoV results should not be used as the sole basis for treatment or patient management decisions; negative results should be considered in the context of a patient's recent exposures, history and the  presene of clinical signs and symptoms consistent with COVID-19.  Negative results should be treated as presumptive and confirmed by molecular assay, if necessary for patient management.   LIPASE - Normal   CULTURE, URINE   CBC W/ AUTO DIFFERENTIAL    Narrative:     The following orders were created for panel order CBC auto differential.  Procedure                               Abnormality         Status                     ---------                               -----------         ------                     CBC with Differential[[id number]]        Abnormal            Final result               Manual Differential[[id number]]          Abnormal            Final result                 Please view results for these tests on the individual orders.   POCT URINE PREGNANCY   POCT GLUCOSE MONITORING CONTINUOUS          Imaging Results    None          Medications   cefTRIAXone (ROCEPHIN) 1 g in dextrose 5 % in water (D5W) 5 % 50 mL IVPB (MB+) (1 g Intravenous New Bag 7/2/22 0636)   ondansetron injection 4 mg (4 mg Intravenous Given 7/2/22 0542)                       Clinical Impression:   Final diagnoses:  [R00.0] Tachycardia  [F12.10] Marijuana abuse, continuous (Primary)  [N30.01] Acute cystitis with hematuria          ED Disposition Condition    Discharge Stable        ED Prescriptions     Medication Sig Dispense Start Date End Date Auth. Provider    ciprofloxacin HCl (CIPRO) 500 MG tablet Take 1 tablet (500 mg total) by mouth 2 (two) times daily. for 10 days 20 tablet 7/2/2022 7/12/2022 Humera Chiang MD    ondansetron (ZOFRAN) 4 MG tablet Take 1 tablet (4 mg total) by mouth every 6 (six) hours. 12 tablet 7/2/2022  Humera Chiang MD        Follow-up Information     Follow up With Specialties Details Why Contact [facility]  Schedule an appointment as soon as possible for a visit in 2 days  [address] [facility] [place] [address] [phone]             Humera Chiang MD  07/02/22  6373

## 2022-07-02 NOTE — ED TRIAGE NOTES
Triage stat called to alondra and patient had to be lifted from the back seat of the car and placed in the wheelchair with maximum assist.  Patient's  reports that she was vomiting and it woke him up and then she would not walk.  Patient was not responding to verbal stimuli and was taken to [facility] where she was placed on the monitor and then began to answer questions appropriately.

## 2022-07-05 ENCOUNTER — TELEPHONE (OUTPATIENT)
Dept: EMERGENCY MEDICINE | Facility: HOSPITAL | Age: 47
End: 2022-07-05
Payer: MEDICAID

## 2022-07-05 LAB — UA COMPLETE W REFLEX CULTURE PNL UR: ABNORMAL

## 2022-07-05 RX ORDER — CEFUROXIME AXETIL 500 MG/1
500 TABLET ORAL EVERY 12 HOURS
Qty: 20 TABLET | Refills: 0 | Status: SHIPPED | OUTPATIENT
Start: 2022-07-05 | End: 2022-07-11

## 2022-07-05 NOTE — TELEPHONE ENCOUNTER
----- Message from HORTENSIA Zaldivar sent at 7/5/2022  7:43 AM CDT -----  Please notify the patient she needs to  prescription for ceftin and take as directed

## 2022-07-11 ENCOUNTER — OFFICE VISIT (OUTPATIENT)
Dept: FAMILY MEDICINE | Facility: CLINIC | Age: 47
End: 2022-07-11
Payer: MEDICAID

## 2022-07-11 VITALS
RESPIRATION RATE: 18 BRPM | SYSTOLIC BLOOD PRESSURE: 154 MMHG | OXYGEN SATURATION: 98 % | HEIGHT: 67 IN | DIASTOLIC BLOOD PRESSURE: 90 MMHG | BODY MASS INDEX: 31.86 KG/M2 | TEMPERATURE: 98 F | WEIGHT: 203 LBS | HEART RATE: 85 BPM

## 2022-07-11 DIAGNOSIS — R10.9 ACUTE RIGHT FLANK PAIN: ICD-10-CM

## 2022-07-11 DIAGNOSIS — N20.0 RIGHT RENAL STONE: Primary | ICD-10-CM

## 2022-07-11 PROBLEM — R51.9 CEPHALALGIA: Status: ACTIVE | Noted: 2018-12-10

## 2022-07-11 LAB
BACTERIA #/AREA URNS HPF: ABNORMAL /HPF
BILIRUB UR QL STRIP: NEGATIVE
CLARITY UR: CLEAR
COLOR UR: YELLOW
GLUCOSE UR STRIP-MCNC: NEGATIVE MG/DL
KETONES UR STRIP-SCNC: NEGATIVE MG/DL
LEUKOCYTE ESTERASE UR QL STRIP: ABNORMAL
NITRITE UR QL STRIP: NEGATIVE
PH UR STRIP: 7 PH UNITS
PROT UR QL STRIP: NEGATIVE
RBC # UR STRIP: NEGATIVE /UL
RBC #/AREA URNS HPF: ABNORMAL /HPF
SP GR UR STRIP: 1.01
SQUAMOUS #/AREA URNS LPF: ABNORMAL /LPF
UROBILINOGEN UR STRIP-ACNC: 0.2 MG/DL
WBC #/AREA URNS HPF: ABNORMAL /HPF

## 2022-07-11 PROCEDURE — 4010F PR ACE/ARB THEARPY RXD/TAKEN: ICD-10-PCS | Mod: CPTII,,, | Performed by: NURSE PRACTITIONER

## 2022-07-11 PROCEDURE — 1160F PR REVIEW ALL MEDS BY PRESCRIBER/CLIN PHARMACIST DOCUMENTED: ICD-10-PCS | Mod: CPTII,,, | Performed by: NURSE PRACTITIONER

## 2022-07-11 PROCEDURE — 3077F SYST BP >= 140 MM HG: CPT | Mod: CPTII,,, | Performed by: NURSE PRACTITIONER

## 2022-07-11 PROCEDURE — 1159F MED LIST DOCD IN RCRD: CPT | Mod: CPTII,,, | Performed by: NURSE PRACTITIONER

## 2022-07-11 PROCEDURE — 96372 THER/PROPH/DIAG INJ SC/IM: CPT | Mod: ,,, | Performed by: NURSE PRACTITIONER

## 2022-07-11 PROCEDURE — 96372 PR INJECTION,THERAP/PROPH/DIAG2ST, IM OR SUBCUT: ICD-10-PCS | Mod: ,,, | Performed by: NURSE PRACTITIONER

## 2022-07-11 PROCEDURE — 99214 PR OFFICE/OUTPT VISIT, EST, LEVL IV, 30-39 MIN: ICD-10-PCS | Mod: 25,,, | Performed by: NURSE PRACTITIONER

## 2022-07-11 PROCEDURE — 4010F ACE/ARB THERAPY RXD/TAKEN: CPT | Mod: CPTII,,, | Performed by: NURSE PRACTITIONER

## 2022-07-11 PROCEDURE — 99214 OFFICE O/P EST MOD 30 MIN: CPT | Mod: 25,,, | Performed by: NURSE PRACTITIONER

## 2022-07-11 PROCEDURE — 81001 URINALYSIS, REFLEX TO URINE CULTURE: ICD-10-PCS | Mod: ,,, | Performed by: CLINICAL MEDICAL LABORATORY

## 2022-07-11 PROCEDURE — 3077F PR MOST RECENT SYSTOLIC BLOOD PRESSURE >= 140 MM HG: ICD-10-PCS | Mod: CPTII,,, | Performed by: NURSE PRACTITIONER

## 2022-07-11 PROCEDURE — 3008F PR BODY MASS INDEX (BMI) DOCUMENTED: ICD-10-PCS | Mod: CPTII,,, | Performed by: NURSE PRACTITIONER

## 2022-07-11 PROCEDURE — 1159F PR MEDICATION LIST DOCUMENTED IN MEDICAL RECORD: ICD-10-PCS | Mod: CPTII,,, | Performed by: NURSE PRACTITIONER

## 2022-07-11 PROCEDURE — 3080F PR MOST RECENT DIASTOLIC BLOOD PRESSURE >= 90 MM HG: ICD-10-PCS | Mod: CPTII,,, | Performed by: NURSE PRACTITIONER

## 2022-07-11 PROCEDURE — 81001 URINALYSIS AUTO W/SCOPE: CPT | Mod: ,,, | Performed by: CLINICAL MEDICAL LABORATORY

## 2022-07-11 PROCEDURE — 3008F BODY MASS INDEX DOCD: CPT | Mod: CPTII,,, | Performed by: NURSE PRACTITIONER

## 2022-07-11 PROCEDURE — 1160F RVW MEDS BY RX/DR IN RCRD: CPT | Mod: CPTII,,, | Performed by: NURSE PRACTITIONER

## 2022-07-11 PROCEDURE — 3080F DIAST BP >= 90 MM HG: CPT | Mod: CPTII,,, | Performed by: NURSE PRACTITIONER

## 2022-07-11 RX ORDER — AMLODIPINE BESYLATE 5 MG/1
5 TABLET ORAL DAILY
COMMUNITY
Start: 2022-07-04 | End: 2022-07-11 | Stop reason: SDUPTHER

## 2022-07-11 RX ORDER — TRAMADOL HYDROCHLORIDE 50 MG/1
50 TABLET ORAL EVERY 6 HOURS
Qty: 20 TABLET | Refills: 0 | Status: SHIPPED | OUTPATIENT
Start: 2022-07-11 | End: 2022-07-16

## 2022-07-11 RX ORDER — TAMSULOSIN HYDROCHLORIDE 0.4 MG/1
1 CAPSULE ORAL DAILY
COMMUNITY
Start: 2022-07-06 | End: 2023-09-06

## 2022-07-11 RX ORDER — POTASSIUM CHLORIDE 20 MEQ/1
20 TABLET, EXTENDED RELEASE ORAL
COMMUNITY
End: 2023-09-06 | Stop reason: SDUPTHER

## 2022-07-11 RX ORDER — KETOROLAC TROMETHAMINE 30 MG/ML
30 INJECTION, SOLUTION INTRAMUSCULAR; INTRAVENOUS
Status: COMPLETED | OUTPATIENT
Start: 2022-07-11 | End: 2022-07-11

## 2022-07-11 RX ADMIN — KETOROLAC TROMETHAMINE 30 MG: 30 INJECTION, SOLUTION INTRAMUSCULAR; INTRAVENOUS at 02:07

## 2022-07-11 NOTE — PROGRESS NOTES
[facility]    Chief Complaint      Chief Complaint   Patient presents with    Follow-up     F/u hospital discharge last week. Pt stated admitted for 8cm kidney stone.     History of Present Illness      Dai Velez is a 46 y.o. female with chronic conditions of hypertension who presents today for c/o right flank pain. She reports that she was admitted to [facility] 22-22. She presents today for hospital follow up but c/o right flank pain and burning urination.    Flank Pain  This is a recurrent problem. The current episode started 1 to 4 weeks ago. The problem occurs intermittently. The problem has been gradually worsening since onset. The pain is present in the costovertebral angle. The quality of the pain is described as cramping. The pain radiates to the right thigh. The pain is at a severity of 8/10. The pain is moderate. The pain is the same all the time. The symptoms are aggravated by urinating. Associated symptoms include dysuria. Pertinent negatives include no abdominal pain, bladder incontinence, bowel incontinence, chest pain, fever, headaches, leg pain, numbness, paresis, paresthesias, pelvic pain, perianal numbness, tingling, weakness or weight loss. Risk factors include renal stones. She has tried nothing for the symptoms.     Past Medical History:  Past Medical History:   Diagnosis Date    HTN (hypertension)      Past Surgical History:   has a past surgical history that includes  section.    Social History:  Social History     Tobacco Use    Smoking status: Current Some Day Smoker    Smokeless tobacco: Never Used   Substance Use Topics    Alcohol use: Yes    Drug use: Never     I personally reviewed all past medical, surgical, and social.     Review of Systems   Constitutional: Negative for fever and weight loss.   Cardiovascular: Negative for chest pain.   Gastrointestinal: Negative for abdominal pain and bowel incontinence.   Genitourinary: Positive for  dysuria and flank pain. Negative for bladder incontinence and pelvic pain.   Neurological: Negative for tingling, weakness, numbness, headaches and paresthesias.      Medications:  Outpatient Encounter Medications as of 7/11/2022   Medication Sig Dispense Refill    ondansetron (ZOFRAN) 4 MG tablet Take 1 tablet (4 mg total) by mouth every 6 (six) hours. 12 tablet 0    pantoprazole (PROTONIX) 20 MG tablet Take 1 tablet (20 mg total) by mouth once daily. 30 tablet 0    potassium chloride SA (K-DUR,KLOR-CON) 20 MEQ tablet Take 20 mEq by mouth.      promethazine (PHENERGAN) 25 MG suppository Place 1 suppository (25 mg total) rectally every 6 (six) hours as needed for Nausea. Use if unable to tolerate oral Phenergan. 12 suppository 0    tamsulosin (FLOMAX) 0.4 mg Cap Take 1 capsule by mouth once daily.      [DISCONTINUED] amLODIPine (NORVASC) 5 MG tablet Take 5 mg by mouth once daily.      [DISCONTINUED] cefUROXime (CEFTIN) 500 MG tablet Take 1 tablet (500 mg total) by mouth every 12 (twelve) hours. for 10 days 20 tablet 0    [DISCONTINUED] ciprofloxacin HCl (CIPRO) 500 MG tablet Take 1 tablet (500 mg total) by mouth 2 (two) times daily. for 10 days 20 tablet 0    amLODIPine-benazepriL (LOTREL) 10-40 mg per capsule Take 1 capsule by mouth once daily.      traMADoL (ULTRAM) 50 mg tablet Take 1 tablet (50 mg total) by mouth every 6 (six) hours. for 5 days 20 tablet 0    [DISCONTINUED] ondansetron (ZOFRAN) 4 MG tablet Take 1 tablet (4 mg total) by mouth every 6 (six) hours. (Patient not taking: Reported on 7/11/2022) 12 tablet 0    [DISCONTINUED] promethazine (PHENERGAN) 25 MG tablet Take 1 tablet (25 mg total) by mouth every 6 (six) hours as needed for Nausea. (Patient not taking: Reported on 7/11/2022) 10 tablet 0     Facility-Administered Encounter Medications as of 7/11/2022   Medication Dose Route Frequency Provider Last Rate Last Admin    [COMPLETED] ketorolac injection 30 mg  30 mg Intramuscular 1 time  "in Clinic/[facility] Deja Lange, FNP   30 mg at 07/11/22 1447     Allergies:  Review of patient's allergies indicates:   Allergen Reactions    Penicillins      Health Maintenance:  Immunization History   Administered Date(s) Administered    Tdap 10/12/2021      Health Maintenance   Topic Date Due    Hepatitis C Screening  Never done    Lipid Panel  Never done    Mammogram  Never done    TETANUS VACCINE  10/12/2031      Physical Exam      Vital Signs  Temp: 98.3 °F (36.8 °C)  Pulse: 85  Resp: 18  SpO2: 98 %  BP: (!) 154/90  Pain Score:   8  Height and Weight  Height: 5' 7" (170.2 cm)  Weight: 92.1 kg (203 lb)  BSA (Calculated - sq m): 2.09 sq meters  BMI (Calculated): 31.8  Weight in (lb) to have BMI = 25: 159.3]    Physical Exam     Laboratory:  CBC:  Recent Labs   Lab 05/13/22  0359 05/15/22  0457 07/02/22 0528 07/02/22  0533   WBC 5.78 4.99 8.62  --    RBC 4.59 4.59 5.06  --    Hemoglobin 12.9 12.8 14.1  --    POC Hematocrit  --   --   --  46   Hematocrit 40.6 39.7 43.1  --    Platelet Count 176 192 243  --    MCV 88.5 86.5 85.2  --    MCH 28.1 27.9 27.9  --    MCHC 31.8 L 32.2 32.7  --      CMP:  Recent Labs   Lab 05/11/22 2025 05/12/22 0728 05/13/22 0359 07/02/22  0528   Glucose 100 84   < > 136 H   Calcium 8.8 8.8   < > 9.1   Albumin 3.2 L 3.2 L  --  3.8   Total Protein 7.3 7.1  --  8.0   Sodium 138 139   < > 146 H   Potassium 2.9 L 3.5   < > 4.2   CO2 20 L 19 L   < > 24   Chloride 108 H 103   < > 108 H   BUN 5 L 7   < > 11   Alk Phos 73 72  --  75   ALT 26 32  --  29   AST 26 37  --  27   Bilirubin, Total 0.8 0.8  --  0.4    < > = values in this interval not displayed.     LIPIDS:  Recent Labs   Lab 05/13/22  0400   TSH 1.610     TSH:  Recent Labs   Lab 05/13/22  0400   TSH 1.610     Assessment/Plan     Dai Velez is a 46 y.o.female with:    1. Acute right flank pain  - POCT URINALYSIS W/O SCOPE  - ketorolac injection 30 mg  - traMADoL (ULTRAM) 50 mg tablet; Take 1 tablet (50 mg total) by mouth every " 6 (six) hours. for 5 days  Dispense: 20 tablet; Refill: 0    2. Right renal stone  - traMADoL (ULTRAM) 50 mg tablet; Take 1 tablet (50 mg total) by mouth every 6 (six) hours. for 5 days  Dispense: 20 tablet; Refill: 0  -Contact information for Dr. Salguero provided. Instructed patient to call for follow up information. Patient spoke Dr. Salguero's office and is scheduled for cystoscope 07/18/2022.  -Medical release signed to obtain records from [facility].     Chronic conditions status updated as per HPI.  Other than changes above, cont current medications and maintain follow up with specialists.  Return to clinic in 2 weeks.     Deja Lange NP  [facility]

## 2022-07-21 ENCOUNTER — TELEPHONE (OUTPATIENT)
Dept: FAMILY MEDICINE | Facility: CLINIC | Age: 47
End: 2022-07-21
Payer: MEDICAID

## 2022-07-21 NOTE — TELEPHONE ENCOUNTER
----- Message from HORTENSIA Guillen sent at 7/12/2022  5:51 PM CDT -----  Urine is okay. Keep schedule follow up with urology.

## 2022-07-21 NOTE — TELEPHONE ENCOUNTER
----- Message from HORTENSIA uGillen sent at 7/12/2022  5:51 PM CDT -----  Urine is okay. Keep schedule follow up with urology.

## 2022-07-21 NOTE — TELEPHONE ENCOUNTER
Spoke with Ms Velez and states she has had a Lipotripsy performed in [place], [place] where she is currently hospitalized and has stents now placed due to stones, no other concerns voiced @ this time. Magana end send Task to provider, on this new information.

## 2022-07-28 ENCOUNTER — HOSPITAL ENCOUNTER (EMERGENCY)
Facility: HOSPITAL | Age: 47
Discharge: HOME | End: 2022-07-28
Attending: EMERGENCY MEDICINE
Payer: MEDICAID

## 2022-07-28 VITALS
OXYGEN SATURATION: 97 % | SYSTOLIC BLOOD PRESSURE: 138 MMHG | RESPIRATION RATE: 16 BRPM | DIASTOLIC BLOOD PRESSURE: 84 MMHG | HEIGHT: 67 IN | HEART RATE: 84 BPM | BODY MASS INDEX: 29.35 KG/M2 | TEMPERATURE: 98 F | WEIGHT: 187 LBS

## 2022-07-28 DIAGNOSIS — R11.2 NAUSEA AND VOMITING, INTRACTABILITY OF VOMITING NOT SPECIFIED, UNSPECIFIED VOMITING TYPE: Primary | ICD-10-CM

## 2022-07-28 DIAGNOSIS — A59.9 TRICHOMONAS INFECTION: ICD-10-CM

## 2022-07-28 DIAGNOSIS — R31.9 URINARY TRACT INFECTION WITH HEMATURIA, SITE UNSPECIFIED: ICD-10-CM

## 2022-07-28 DIAGNOSIS — N39.0 URINARY TRACT INFECTION WITH HEMATURIA, SITE UNSPECIFIED: ICD-10-CM

## 2022-07-28 LAB
ALBUMIN SERPL BCP-MCNC: 3.1 G/DL (ref 3.5–5)
ALBUMIN/GLOB SERPL: 0.8 {RATIO}
ALP SERPL-CCNC: 84 U/L (ref 39–100)
ALT SERPL W P-5'-P-CCNC: 35 U/L (ref 13–56)
ANION GAP SERPL CALCULATED.3IONS-SCNC: 14 MMOL/L (ref 7–16)
ANISOCYTOSIS BLD QL SMEAR: ABNORMAL
AST SERPL W P-5'-P-CCNC: 21 U/L (ref 15–37)
BACTERIA #/AREA URNS HPF: ABNORMAL /HPF
BASOPHILS # BLD AUTO: 0.09 K/UL (ref 0–0.2)
BASOPHILS NFR BLD AUTO: 0.6 % (ref 0–1)
BASOPHILS NFR BLD MANUAL: 1 % (ref 0–1)
BILIRUB SERPL-MCNC: 0.2 MG/DL (ref 0–1.2)
BILIRUB UR QL STRIP: NEGATIVE
BUN SERPL-MCNC: 11 MG/DL (ref 7–18)
BUN/CREAT SERPL: 15 (ref 6–20)
CALCIUM SERPL-MCNC: 9.1 MG/DL (ref 8.5–10.1)
CAOX CRY #/AREA URNS LPF: ABNORMAL /LPF
CHLORIDE SERPL-SCNC: 104 MMOL/L (ref 98–107)
CLARITY UR: ABNORMAL
CO2 SERPL-SCNC: 25 MMOL/L (ref 21–32)
COLOR UR: YELLOW
CREAT SERPL-MCNC: 0.72 MG/DL (ref 0.55–1.02)
DIFFERENTIAL METHOD BLD: ABNORMAL
EOSINOPHIL # BLD AUTO: 0.15 K/UL (ref 0–0.5)
EOSINOPHIL NFR BLD AUTO: 1.1 % (ref 1–4)
EOSINOPHIL NFR BLD MANUAL: 2 % (ref 1–4)
ERYTHROCYTE [DISTWIDTH] IN BLOOD BY AUTOMATED COUNT: 14.6 % (ref 11.5–14.5)
GLOBULIN SER-MCNC: 3.7 G/DL (ref 2–4)
GLUCOSE SERPL-MCNC: 93 MG/DL (ref 74–106)
GLUCOSE UR STRIP-MCNC: NORMAL MG/DL
HCT VFR BLD AUTO: 40.7 % (ref 38–47)
HGB BLD-MCNC: 13.2 G/DL (ref 12–16)
HYPOCHROMIA BLD QL SMEAR: ABNORMAL
IMM GRANULOCYTES # BLD AUTO: 0.99 K/UL (ref 0–0.04)
IMM GRANULOCYTES NFR BLD: 7.1 % (ref 0–0.4)
KETONES UR STRIP-SCNC: NEGATIVE MG/DL
LEUKOCYTE ESTERASE UR QL STRIP: ABNORMAL
LYMPHOCYTES # BLD AUTO: 2.95 K/UL (ref 1–4.8)
LYMPHOCYTES NFR BLD AUTO: 21.3 % (ref 27–41)
LYMPHOCYTES NFR BLD MANUAL: 20 % (ref 27–41)
MCH RBC QN AUTO: 27.7 PG (ref 27–31)
MCHC RBC AUTO-ENTMCNC: 32.4 G/DL (ref 32–36)
MCV RBC AUTO: 85.3 FL (ref 80–96)
MIXED CELL CASTS #/AREA UR COMP ASSIST: ABNORMAL /[LPF]
MONOCYTES # BLD AUTO: 0.8 K/UL (ref 0–0.8)
MONOCYTES NFR BLD AUTO: 5.8 % (ref 2–6)
MONOCYTES NFR BLD MANUAL: 4 % (ref 2–6)
MPC BLD CALC-MCNC: 10.8 FL (ref 9.4–12.4)
MUCOUS THREADS #/AREA URNS HPF: ABNORMAL /HPF
NEUTROPHILS # BLD AUTO: 8.87 K/UL (ref 1.8–7.7)
NEUTROPHILS NFR BLD AUTO: 64.1 % (ref 53–65)
NEUTS SEG NFR BLD MANUAL: 73 % (ref 50–62)
NITRITE UR QL STRIP: NEGATIVE
NRBC # BLD AUTO: 0 X10E3/UL
NRBC BLD MANUAL-RTO: 1 /100 WBC
NRBC, AUTO (.00): 0 %
OVALOCYTES BLD QL SMEAR: ABNORMAL
PH UR STRIP: 5.5 PH UNITS
PLATELET # BLD AUTO: 518 K/UL (ref 150–400)
PLATELET MORPHOLOGY: ABNORMAL
POLYCHROMASIA BLD QL SMEAR: ABNORMAL
POTASSIUM SERPL-SCNC: 4.1 MMOL/L (ref 3.5–5.1)
PROT SERPL-MCNC: 6.8 G/DL (ref 6.4–8.2)
PROT UR QL STRIP: 70
RBC # BLD AUTO: 4.77 M/UL (ref 4.2–5.4)
RBC # UR STRIP: ABNORMAL /UL
RBC #/AREA URNS HPF: ABNORMAL /HPF
SODIUM SERPL-SCNC: 139 MMOL/L (ref 136–145)
SP GR UR STRIP: 1.01
SQUAMOUS #/AREA URNS LPF: ABNORMAL /LPF
TRANS CELLS #/AREA URNS LPF: ABNORMAL /LPF
TRICHOMONAS #/AREA URNS HPF: ABNORMAL /HPF
UROBILINOGEN UR STRIP-ACNC: NORMAL MG/DL
WBC # BLD AUTO: 13.85 K/UL (ref 4.5–11)
WBC #/AREA URNS HPF: ABNORMAL /HPF
YEAST #/AREA URNS HPF: ABNORMAL /HPF

## 2022-07-28 PROCEDURE — 87086 URINE CULTURE/COLONY COUNT: CPT | Performed by: EMERGENCY MEDICINE

## 2022-07-28 PROCEDURE — 96361 HYDRATE IV INFUSION ADD-ON: CPT

## 2022-07-28 PROCEDURE — 85025 COMPLETE CBC W/AUTO DIFF WBC: CPT | Performed by: EMERGENCY MEDICINE

## 2022-07-28 PROCEDURE — 99285 EMERGENCY DEPT VISIT HI MDM: CPT | Mod: 25

## 2022-07-28 PROCEDURE — 96375 TX/PRO/DX INJ NEW DRUG ADDON: CPT

## 2022-07-28 PROCEDURE — 80053 COMPREHEN METABOLIC PANEL: CPT | Performed by: EMERGENCY MEDICINE

## 2022-07-28 PROCEDURE — 99284 EMERGENCY DEPT VISIT MOD MDM: CPT | Mod: ,,, | Performed by: EMERGENCY MEDICINE

## 2022-07-28 PROCEDURE — 25000003 PHARM REV CODE 250: Performed by: EMERGENCY MEDICINE

## 2022-07-28 PROCEDURE — 36415 COLL VENOUS BLD VENIPUNCTURE: CPT | Performed by: EMERGENCY MEDICINE

## 2022-07-28 PROCEDURE — 63600175 PHARM REV CODE 636 W HCPCS: Performed by: EMERGENCY MEDICINE

## 2022-07-28 PROCEDURE — 96365 THER/PROPH/DIAG IV INF INIT: CPT

## 2022-07-28 PROCEDURE — 82040 ASSAY OF SERUM ALBUMIN: CPT | Performed by: EMERGENCY MEDICINE

## 2022-07-28 PROCEDURE — 81001 URINALYSIS AUTO W/SCOPE: CPT | Performed by: EMERGENCY MEDICINE

## 2022-07-28 PROCEDURE — 99284 PR EMERGENCY DEPT VISIT,LEVEL IV: ICD-10-PCS | Mod: ,,, | Performed by: EMERGENCY MEDICINE

## 2022-07-28 RX ORDER — ONDANSETRON 2 MG/ML
4 INJECTION INTRAMUSCULAR; INTRAVENOUS
Status: COMPLETED | OUTPATIENT
Start: 2022-07-28 | End: 2022-07-28

## 2022-07-28 RX ORDER — CEPHALEXIN 500 MG/1
500 CAPSULE ORAL 4 TIMES DAILY
Qty: 40 CAPSULE | Refills: 0 | Status: SHIPPED | OUTPATIENT
Start: 2022-07-28 | End: 2022-08-07

## 2022-07-28 RX ORDER — HYDROMORPHONE HYDROCHLORIDE 2 MG/ML
1 INJECTION, SOLUTION INTRAMUSCULAR; INTRAVENOUS; SUBCUTANEOUS
Status: COMPLETED | OUTPATIENT
Start: 2022-07-28 | End: 2022-07-28

## 2022-07-28 RX ORDER — METRONIDAZOLE 500 MG/1
500 TABLET ORAL 3 TIMES DAILY
Qty: 21 TABLET | Refills: 0 | Status: SHIPPED | OUTPATIENT
Start: 2022-07-28 | End: 2022-08-04

## 2022-07-28 RX ADMIN — ONDANSETRON 4 MG: 2 INJECTION INTRAMUSCULAR; INTRAVENOUS at 05:07

## 2022-07-28 RX ADMIN — SODIUM CHLORIDE 1000 ML: 9 INJECTION, SOLUTION INTRAVENOUS at 05:07

## 2022-07-28 RX ADMIN — HYDROMORPHONE HYDROCHLORIDE 1 MG: 2 INJECTION INTRAMUSCULAR; INTRAVENOUS; SUBCUTANEOUS at 05:07

## 2022-07-28 RX ADMIN — CEFTRIAXONE SODIUM 1 G: 1 INJECTION, POWDER, FOR SOLUTION INTRAMUSCULAR; INTRAVENOUS at 06:07

## 2022-07-28 NOTE — DISCHARGE INSTRUCTIONS
Take medications as prescribed.  Have sexual partner checked and treated for Trichomonas.  Return to emergency department for any worsening or further problems.

## 2022-07-28 NOTE — ED PROVIDER NOTES
Encounter Date: 2022       History     Chief Complaint   Patient presents with    Abdominal Pain     Patient is a 46-year-old female who had a ureteral stent placed on the right side at [facility] in [place] 1 week ago.  Patient states that she was doing okay until yesterday when she began having severe right flank pain and right groin pain similar to her kidney stone pain.  Patient is also complaining of severe dysuria since yesterday.  Patient also complains of some vomiting and diarrhea.  No fever, no cough, no other acute problems or complaints at this time.  No previous abdominal surgery other than .        Review of patient's allergies indicates:   Allergen Reactions    Penicillins      Past Medical History:   Diagnosis Date    HTN (hypertension)      Past Surgical History:   Procedure Laterality Date     SECTION       No family history on file.  Social History     Tobacco Use    Smoking status: Current Some Day Smoker    Smokeless tobacco: Never Used   Substance Use Topics    Alcohol use: Yes    Drug use: Never     Review of Systems   Gastrointestinal: Positive for abdominal pain, diarrhea, nausea and vomiting.   Genitourinary: Positive for dysuria and flank pain.   All other systems reviewed and are negative.      Physical Exam     Initial Vitals [22 1532]   BP Pulse Resp Temp SpO2   (!) 145/95 (!) 112 20 98.3 °F (36.8 °C) 96 %      MAP       --         Physical Exam    Nursing note and vitals reviewed.  Constitutional: She appears well-developed and well-nourished.   HENT:   Head: Normocephalic.   Eyes: Pupils are equal, round, and reactive to light.   Cardiovascular: Normal rate.   Pulmonary/Chest: Breath sounds normal.   Abdominal: Abdomen is soft.   Musculoskeletal:         General: Normal range of motion.     Neurological: She is alert.   Skin: Skin is warm. Capillary refill takes less than 2 seconds.   Psychiatric: She has a normal mood and affect.         Medical  Screening Exam   See Full Note    ED Course   Procedures  Labs Reviewed   COMPREHENSIVE METABOLIC PANEL - Abnormal; Notable for the following components:       Result Value    Albumin 3.1 (*)     All other components within normal limits   URINALYSIS, REFLEX TO URINE CULTURE - Abnormal; Notable for the following components:    Leukocytes, UA Large (*)     Protein, UA 70  (*)     Blood, UA Large (*)     All other components within normal limits   CBC WITH DIFFERENTIAL - Abnormal; Notable for the following components:    WBC 13.85 (*)     RDW 14.6 (*)     Platelet Count 518 (*)     Lymphocytes % 21.3 (*)     Immature Granulocytes % 7.1 (*)     Neutrophils, Abs 8.87 (*)     Immature Granulocytes, Absolute 0.99 (*)     All other components within normal limits   URINALYSIS, MICROSCOPIC - Abnormal; Notable for the following components:    WBC, UA 11-15 (*)     RBC, UA Too Numerous To Count (*)     Bacteria, UA Moderate (*)     Yeast, UA Rare (*)     Squamous Epithelial Cells, UA Moderate (*)     Transitional Epithelial Cells, UA Few (*)     Mucus, UA Moderate (*)     Trichomonas, UA Few (*)     Calcium Oxalate Crystals, UA Few (*)     All other components within normal limits   MANUAL DIFFERENTIAL - Abnormal; Notable for the following components:    Segmented Neutrophils, Man % 73 (*)     Lymphocytes, Man % 20 (*)     nRBC, Manual 1 (*)     Platelet Morphology Few Large Platelets (*)     All other components within normal limits   CULTURE, URINE   CBC W/ AUTO DIFFERENTIAL    Narrative:     The following orders were created for panel order CBC auto differential.  Procedure                               Abnormality         Status                     ---------                               -----------         ------                     CBC with Differential[[id number]]        Abnormal            Final result               Manual Differential[[id number]]          Abnormal            Final result                 Please view  results for these tests on the individual orders.          Imaging Results          CT Renal Stone Study ABD Pelvis WO (Final result)  Result time 07/28/22 17:24:30    Final result by Jairo Villegas MD (07/28/22 17:24:30)                 Impression:      Right ureteral stent is well positioned.  There is no hydronephrosis.  There is some fat stranding surrounding the right ureter throughout much of its length, presumably related to the recent urologic procedure.    There is bilateral nephrolithiasis.  There is no significant right ureterolithiasis.    No definite acute process otherwise      Electronically signed by: Jairo Villegas  Date:    07/28/2022  Time:    17:24             Narrative:    EXAMINATION:  CT ABDOMEN AND PELVIS without contrast    CLINICAL HISTORY:  Right flank pain.  Kidney stones.  Ureteral stent placed 1 week ago    TECHNIQUE:  Axial CT images were obtained through the abdomen and pelvis without IV contrast.  Oral contrast was not given.  Coronal and sagittal reconstructions submitted and interpreted.  Total .1 mGycm.  Automated exposure control utilized.    COMPARISON:  05/13/2022 abdomen pelvis CT    FINDINGS:  CT abdomen:    There is no evidence of pneumoperitoneum.    Partially visualized lung bases are without acute infiltrate.  There is a calcified granuloma left lower lobe.  There is no pleural or pericardial effusion.    Liver, gallbladder, bile ducts, pancreas, spleen, and adrenal glands are unremarkable in noncontrast CT appearance.    There is a double pigtail ureteral stent in satisfactory position on the right, extending from the right renal pelvis to the bladder lumen.  There is mild right nephrolithiasis.  There is no definite right ureterolithiasis.  There is no right-sided hydronephrosis.  There is some Periureteral soft tissue stranding throughout much of the length of the right ureter which presumably is postprocedure in nature.  There is no confined fluid  collection to suggest abscess.    There is a punctate 1-2 mm nonobstructing calculus within lower pole left kidney.    There is no aneurysm of the mildly calcified abdominal aorta.    Appendix is identified and appears normal.    Some mild prominence of the wall of the ascending colon may be artifactual and related to lack of distention.  There is no cecilia bowel obstruction.    There is a small periumbilical hernia containing only fat.    CT pelvis:    Urinary bladder is moderately distended.  No pelvic mass is seen.    No acute osseous findings.  There is mild anterolisthesis of L5 on S1.  There is moderate degenerative disc narrowing at L5-S1.  There is scattered mild-to-moderate lumbar spondylosis                                 Medications   sodium chloride 0.9% bolus 1,000 mL (0 mLs Intravenous Stopped 7/28/22 1810)   HYDROmorphone (PF) injection 1 mg (1 mg Intravenous Given 7/28/22 1711)   ondansetron injection 4 mg (4 mg Intravenous Given 7/28/22 1712)   cefTRIAXone (ROCEPHIN) 1 g in dextrose 5 % in water (D5W) 5 % 50 mL IVPB (MB+) (0 g Intravenous Stopped 7/28/22 1842)                 ED Course as of 07/29/22 1747   Thu Jul 28, 2022   1726 WBC, UA(!): 11-15 [BB]   1726 RBC, UA(!): Too Numerous To Count [BB]   1726 Leukocytes, UA(!): Large [BB]   1735 WBC, UA(!): 11-15 [BB]   1737 WBC(!): 13.85 [BB]   1737 Hemoglobin: 13.2 [BB]   1737 Hematocrit: 40.7 [BB]   1737 Platelets(!): 518 [BB]   1758 BUN: 11 [BB]   1759 Creatinine: 0.72 [BB]   1801 BUN: 11 [BB]   1801 Creatinine: 0.72 [BB]   1801 WBC(!): 13.85 [BB]   1801 Trichomonas, UA(!): Few [BB]   1803 Patient feels much better and is ready for discharge home now. [BB]      ED Course User Index  [BB] Deion Stewart MD          Clinical Impression:   Final diagnoses:  [R11.2] Nausea and vomiting, intractability of vomiting not specified, unspecified vomiting type (Primary)  [N39.0, R31.9] Urinary tract infection with hematuria, site unspecified  [A59.9]  Trichomonas infection          ED Disposition Condition    Discharge Stable        ED Prescriptions     Medication Sig Dispense Start Date End Date Auth. Provider    metroNIDAZOLE (FLAGYL) 500 MG tablet Take 1 tablet (500 mg total) by mouth 3 (three) times daily. for 7 days 21 tablet 7/28/2022 8/4/2022 eDion Stewart MD    cephALEXin (KEFLEX) 500 MG capsule Take 1 capsule (500 mg total) by mouth 4 (four) times daily. for 10 days 40 capsule 7/28/2022 8/7/2022 Deion Stewart MD        Follow-up Information    None          Deion Stewart MD  07/29/22 7850

## 2022-07-30 LAB — UA COMPLETE W REFLEX CULTURE PNL UR: NORMAL

## 2022-08-11 ENCOUNTER — OFFICE VISIT (OUTPATIENT)
Dept: FAMILY MEDICINE | Facility: CLINIC | Age: 47
End: 2022-08-11
Payer: MEDICAID

## 2022-08-11 VITALS
DIASTOLIC BLOOD PRESSURE: 92 MMHG | BODY MASS INDEX: 31.71 KG/M2 | WEIGHT: 202 LBS | HEART RATE: 98 BPM | HEIGHT: 67 IN | OXYGEN SATURATION: 98 % | SYSTOLIC BLOOD PRESSURE: 142 MMHG | TEMPERATURE: 100 F

## 2022-08-11 DIAGNOSIS — I10 ESSENTIAL HYPERTENSION: Primary | ICD-10-CM

## 2022-08-11 PROBLEM — S61.210A LACERATION OF RIGHT INDEX FINGER WITHOUT FOREIGN BODY WITHOUT DAMAGE TO NAIL: Status: RESOLVED | Noted: 2021-10-12 | Resolved: 2022-08-11

## 2022-08-11 PROBLEM — H00.033: Status: RESOLVED | Noted: 2022-06-21 | Resolved: 2022-08-11
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
## 2022-08-11 PROBLEM — S00.01XA ABRASION OF SCALP: Status: RESOLVED | Noted: 2021-10-12 | Resolved: 2022-08-11

## 2022-08-11 PROBLEM — R03.0 ELEVATED BLOOD PRESSURE READING WITHOUT DIAGNOSIS OF HYPERTENSION: Status: RESOLVED | Noted: 2022-05-15 | Resolved: 2022-08-11

## 2022-08-11 PROCEDURE — 1159F MED LIST DOCD IN RCRD: CPT | Mod: CPTII,,, | Performed by: NURSE PRACTITIONER

## 2022-08-11 PROCEDURE — 3077F SYST BP >= 140 MM HG: CPT | Mod: CPTII,,, | Performed by: NURSE PRACTITIONER

## 2022-08-11 PROCEDURE — 4010F ACE/ARB THERAPY RXD/TAKEN: CPT | Mod: CPTII,,, | Performed by: NURSE PRACTITIONER

## 2022-08-11 PROCEDURE — 3080F PR MOST RECENT DIASTOLIC BLOOD PRESSURE >= 90 MM HG: ICD-10-PCS | Mod: CPTII,,, | Performed by: NURSE PRACTITIONER

## 2022-08-11 PROCEDURE — 1159F PR MEDICATION LIST DOCUMENTED IN MEDICAL RECORD: ICD-10-PCS | Mod: CPTII,,, | Performed by: NURSE PRACTITIONER

## 2022-08-11 PROCEDURE — 3008F PR BODY MASS INDEX (BMI) DOCUMENTED: ICD-10-PCS | Mod: CPTII,,, | Performed by: NURSE PRACTITIONER

## 2022-08-11 PROCEDURE — 99213 OFFICE O/P EST LOW 20 MIN: CPT | Mod: ,,, | Performed by: NURSE PRACTITIONER

## 2022-08-11 PROCEDURE — 99213 PR OFFICE/OUTPT VISIT, EST, LEVL III, 20-29 MIN: ICD-10-PCS | Mod: ,,, | Performed by: NURSE PRACTITIONER

## 2022-08-11 PROCEDURE — 3080F DIAST BP >= 90 MM HG: CPT | Mod: CPTII,,, | Performed by: NURSE PRACTITIONER

## 2022-08-11 PROCEDURE — 3077F PR MOST RECENT SYSTOLIC BLOOD PRESSURE >= 140 MM HG: ICD-10-PCS | Mod: CPTII,,, | Performed by: NURSE PRACTITIONER

## 2022-08-11 PROCEDURE — 4010F PR ACE/ARB THEARPY RXD/TAKEN: ICD-10-PCS | Mod: CPTII,,, | Performed by: NURSE PRACTITIONER

## 2022-08-11 PROCEDURE — 3008F BODY MASS INDEX DOCD: CPT | Mod: CPTII,,, | Performed by: NURSE PRACTITIONER

## 2022-08-11 RX ORDER — OXYBUTYNIN CHLORIDE 10 MG/1
10 TABLET, EXTENDED RELEASE ORAL DAILY
COMMUNITY
Start: 2022-08-04 | End: 2023-09-06 | Stop reason: SDUPTHER

## 2022-08-11 RX ORDER — CHLORTHALIDONE 25 MG/1
25 TABLET ORAL EVERY MORNING
Qty: 30 TABLET | Refills: 2 | Status: SHIPPED | OUTPATIENT
Start: 2022-08-11 | End: 2023-07-19 | Stop reason: ALTCHOICE

## 2022-08-11 RX ORDER — AMLODIPINE BESYLATE 5 MG/1
TABLET ORAL
COMMUNITY
End: 2022-08-11 | Stop reason: ALTCHOICE

## 2022-08-11 RX ORDER — AMLODIPINE BESYLATE 10 MG/1
10 TABLET ORAL NIGHTLY
Qty: 30 TABLET | Refills: 2 | Status: SHIPPED | OUTPATIENT
Start: 2022-08-11 | End: 2023-07-19 | Stop reason: SDUPTHER

## 2022-08-11 NOTE — PROGRESS NOTES
[facility] Medicine          Chief Complaint        Chief Complaint   Patient presents with    Hypertension     Patient States She Take Medicine At Night & Wake Up/Blood Pressure Still High  (Requesting Possible Change/Increase Dosage)             History of Present Illness           Dai Velez is a 46 y.o. female with chronic conditions of HTN who presents today for increase blood pressure. The pt has noticed the past several days that her BP is higher and her current dosage of amlodipine at 5mg daily was not helping it.  She has been lately taking amlodipine 10mg and her BP is still elevated and she is having headaches as well.  Pt denies chest pains, n/v, SOB, and dizziness.          Past Medical History:     Past Medical History:   Diagnosis Date    HTN (hypertension)              Past Surgical History:      has a past surgical history that includes  section.          Social History:     Social History     Tobacco Use    Smoking status: Current Some Day Smoker    Smokeless tobacco: Never Used   Substance Use Topics    Alcohol use: Yes    Drug use: Never             I personally reviewed all past medical, surgical, and social.           Review of Systems   Constitutional: Negative.    HENT: Negative.    Eyes: Negative.    Respiratory: Negative.    Cardiovascular: Negative.    Gastrointestinal: Negative.    Endocrine: Negative.    Genitourinary: Negative.    Musculoskeletal: Negative.    Skin: Negative.    Allergic/Immunologic: Negative.    Neurological: Negative.    Psychiatric/Behavioral: Negative.               Medications:     Outpatient Encounter Medications as of 2022   Medication Sig Dispense Refill    oxybutynin (DITROPAN-XL) 10 MG 24 hr tablet Take 10 mg by mouth once daily.      potassium chloride SA (K-DUR,KLOR-CON) 20 MEQ tablet Take 20 mEq by mouth.      tamsulosin (FLOMAX) 0.4 mg Cap Take 1 capsule by mouth once daily.      [DISCONTINUED] amLODIPine (NORVASC) 5 MG tablet  "amlodipine 5 mg tablet   TAKE 1 TABLET BY MOUTH DAILY      amLODIPine (NORVASC) 10 MG tablet Take 1 tablet (10 mg total) by mouth every evening. 30 tablet 2    chlorthalidone (HYGROTEN) 25 MG Tab Take 1 tablet (25 mg total) by mouth every morning. 30 tablet 2    pantoprazole (PROTONIX) 20 MG tablet Take 1 tablet (20 mg total) by mouth once daily. 30 tablet 0    [DISCONTINUED] amLODIPine-benazepriL (LOTREL) 10-40 mg per capsule Take 1 capsule by mouth once daily.      [DISCONTINUED] ondansetron (ZOFRAN) 4 MG tablet Take 1 tablet (4 mg total) by mouth every 6 (six) hours. (Patient not taking: Reported on 8/11/2022) 12 tablet 0    [DISCONTINUED] promethazine (PHENERGAN) 25 MG suppository Place 1 suppository (25 mg total) rectally every 6 (six) hours as needed for Nausea. Use if unable to tolerate oral Phenergan. (Patient not taking: Reported on 8/11/2022) 12 suppository 0     No facility-administered encounter medications on file as of 8/11/2022.             Allergies:     Review of patient's allergies indicates:   Allergen Reactions    Penicillins              Health Maintenance:     Immunization History   Administered Date(s) Administered    Tdap 10/12/2021         Health Maintenance   Topic Date Due    Hepatitis C Screening  Never done    Lipid Panel  Never done    Mammogram  Never done    TETANUS VACCINE  10/12/2031              Physical Exam           Vital Signs  Temp: 99.6 °F (37.6 °C)  Temp src: Oral  Pulse: 98  SpO2: 98 %  BP: (!) 142/92  BP Location: Right arm  Patient Position: Sitting  Height and Weight  Height: 5' 7" (170.2 cm)  Weight: 91.6 kg (202 lb)  BSA (Calculated - sq m): 2.08 sq meters  BMI (Calculated): 31.6  Weight in (lb) to have BMI = 25: 159.3]          Physical Exam  Constitutional:       Appearance: Normal appearance.   HENT:      Head: Normocephalic.      Right Ear: External ear normal.      Left Ear: External ear normal.      Nose: Nose normal.      Mouth/Throat:      Pharynx: " Oropharynx is clear.   Eyes:      Conjunctiva/sclera: Conjunctivae normal.   Cardiovascular:      Rate and Rhythm: Normal rate.   Pulmonary:      Effort: Pulmonary effort is normal.   Abdominal:      General: Abdomen is flat.      Palpations: Abdomen is soft.   Musculoskeletal:         General: No swelling or tenderness. Normal range of motion.      Cervical back: Neck supple.      Right lower leg: No edema.      Left lower leg: No edema.   Skin:     General: Skin is warm.      Coloration: Skin is not jaundiced or pale.      Findings: No bruising, erythema or rash.   Neurological:      General: No focal deficit present.      Mental Status: She is alert and oriented to person, place, and time. Mental status is at baseline.      Cranial Nerves: No cranial nerve deficit.      Sensory: No sensory deficit.      Motor: No weakness.      Gait: Gait normal.   Psychiatric:         Mood and Affect: Mood normal.         Behavior: Behavior normal.         Thought Content: Thought content normal.         Judgment: Judgment normal.                Laboratory:     CBC:     Recent Labs   Lab 05/15/22  0457 07/02/22  0528 07/02/22  0533 07/28/22  1720   WBC 4.99 8.62  --  13.85 H   RBC 4.59 5.06  --  4.77   Hemoglobin 12.8 14.1  --  13.2   POC Hematocrit  --   --    < >  --    Hematocrit 39.7 43.1  --  40.7   Platelet Count 192 243  --  518 H   MCV 86.5 85.2  --  85.3   MCH 27.9 27.9  --  27.7   MCHC 32.2 32.7  --  32.4    < > = values in this interval not displayed.        CMP:     Recent Labs   Lab 05/12/22  0728 05/13/22  0359 07/02/22  0528 07/28/22  1720   Glucose 84   < > 136 H 93   Calcium 8.8   < > 9.1 9.1   Albumin 3.2 L  --  3.8 3.1 L   Total Protein 7.1  --  8.0 6.8   Sodium 139   < > 146 H 139   Potassium 3.5   < > 4.2 4.1   CO2 19 L   < > 24 25   Chloride 103   < > 108 H 104   BUN 7   < > 11 11   Alk Phos 72  --  75 84   ALT 32  --  29 35   AST 37  --  27 21   Bilirubin, Total 0.8  --  0.4 0.2    < > = values in this  interval not displayed.        LIPIDS:     Recent Labs   Lab 05/13/22 0400   TSH 1.610        TSH:     Recent Labs   Lab 05/13/22 0400   TSH 1.610        A1C:                 Assessment/Plan          Dai Velez is a 46 y.o.female with:           1. Essential hypertension  - chlorthalidone (HYGROTEN) 25 MG Tab; Take 1 tablet (25 mg total) by mouth every morning.  Dispense: 30 tablet; Refill: 2  - amLODIPine (NORVASC) 10 MG tablet; Take 1 tablet (10 mg total) by mouth every evening.  Dispense: 30 tablet; Refill: 2      -DASH diet recs, check bp daily and keep a log; return in one month for review  -d/c amlodipine 5mg; start amlodipine 10mg and also add chlorthalidone 25mg in am           Total time spent face-to-face and non-face-to-face coordinating care for this encounter was: 20 min          Chronic conditions status updated as per HPI.  Other than changes above, cont current medications and maintain follow up with specialists.  Return to clinic in one month to review bp log.          ESTRELLITA RappC     [facility]

## 2022-10-30 ENCOUNTER — HOSPITAL ENCOUNTER (EMERGENCY)
Facility: HOSPITAL | Age: 47
Discharge: HOME | End: 2022-10-30
Attending: EMERGENCY MEDICINE
Payer: COMMERCIAL

## 2022-10-30 VITALS
SYSTOLIC BLOOD PRESSURE: 150 MMHG | RESPIRATION RATE: 18 BRPM | BODY MASS INDEX: 33.2 KG/M2 | TEMPERATURE: 99 F | WEIGHT: 212 LBS | HEART RATE: 71 BPM | OXYGEN SATURATION: 100 % | DIASTOLIC BLOOD PRESSURE: 89 MMHG

## 2022-10-30 DIAGNOSIS — K52.9 GASTROENTERITIS: Primary | ICD-10-CM

## 2022-10-30 LAB
ALBUMIN SERPL BCP-MCNC: 4.1 G/DL (ref 3.5–5)
ALBUMIN/GLOB SERPL: 1 {RATIO}
ALP SERPL-CCNC: 87 U/L (ref 39–100)
ALT SERPL W P-5'-P-CCNC: 19 U/L (ref 13–56)
ANION GAP SERPL CALCULATED.3IONS-SCNC: 18 MMOL/L (ref 7–16)
AST SERPL W P-5'-P-CCNC: 20 U/L (ref 15–37)
BACTERIA #/AREA URNS HPF: ABNORMAL /HPF
BASOPHILS # BLD AUTO: 0.06 K/UL (ref 0–0.2)
BASOPHILS NFR BLD AUTO: 0.6 % (ref 0–1)
BILIRUB SERPL-MCNC: 0.4 MG/DL (ref ?–1.2)
BILIRUB UR QL STRIP: NEGATIVE
BUN SERPL-MCNC: 9 MG/DL (ref 7–18)
BUN/CREAT SERPL: 13 (ref 6–20)
CALCIUM SERPL-MCNC: 8.9 MG/DL (ref 8.5–10.1)
CHLORIDE SERPL-SCNC: 107 MMOL/L (ref 98–107)
CLARITY UR: ABNORMAL
CO2 SERPL-SCNC: 20 MMOL/L (ref 21–32)
COLOR UR: ABNORMAL
CREAT SERPL-MCNC: 0.71 MG/DL (ref 0.55–1.02)
DIFFERENTIAL METHOD BLD: ABNORMAL
EGFR (NO RACE VARIABLE) (RUSH/TITUS): 106 ML/MIN/1.73M²
EOSINOPHIL # BLD AUTO: 0 K/UL (ref 0–0.5)
EOSINOPHIL NFR BLD AUTO: 0 % (ref 1–4)
ERYTHROCYTE [DISTWIDTH] IN BLOOD BY AUTOMATED COUNT: 13.9 % (ref 11.5–14.5)
FLUAV AG UPPER RESP QL IA.RAPID: NEGATIVE
FLUBV AG UPPER RESP QL IA.RAPID: NEGATIVE
GLOBULIN SER-MCNC: 4 G/DL (ref 2–4)
GLUCOSE SERPL-MCNC: 126 MG/DL (ref 74–106)
GLUCOSE UR STRIP-MCNC: NORMAL MG/DL
HCT VFR BLD AUTO: 42.7 % (ref 38–47)
HGB BLD-MCNC: 13.9 G/DL (ref 12–16)
IMM GRANULOCYTES # BLD AUTO: 0.11 K/UL (ref 0–0.04)
IMM GRANULOCYTES NFR BLD: 1.1 % (ref 0–0.4)
KETONES UR STRIP-SCNC: 20 MG/DL
LEUKOCYTE ESTERASE UR QL STRIP: ABNORMAL
LIPASE SERPL-CCNC: 76 U/L (ref 73–393)
LYMPHOCYTES # BLD AUTO: 1.04 K/UL (ref 1–4.8)
LYMPHOCYTES NFR BLD AUTO: 10.1 % (ref 27–41)
LYMPHOCYTES NFR BLD MANUAL: 10 % (ref 27–41)
MAGNESIUM SERPL-MCNC: 1.8 MG/DL (ref 1.7–2.3)
MCH RBC QN AUTO: 27.7 PG (ref 27–31)
MCHC RBC AUTO-ENTMCNC: 32.6 G/DL (ref 32–36)
MCV RBC AUTO: 85.1 FL (ref 80–96)
MONOCYTES # BLD AUTO: 0.26 K/UL (ref 0–0.8)
MONOCYTES NFR BLD AUTO: 2.5 % (ref 2–6)
MONOCYTES NFR BLD MANUAL: 2 % (ref 2–6)
MPC BLD CALC-MCNC: 12.3 FL (ref 9.4–12.4)
NEUTROPHILS # BLD AUTO: 8.83 K/UL (ref 1.8–7.7)
NEUTROPHILS NFR BLD AUTO: 85.7 % (ref 53–65)
NEUTS BAND NFR BLD MANUAL: 2 % (ref 1–5)
NEUTS SEG NFR BLD MANUAL: 86 % (ref 50–62)
NITRITE UR QL STRIP: NEGATIVE
NRBC # BLD AUTO: 0 X10E3/UL
NRBC, AUTO (.00): 0 %
PH UR STRIP: 7.5 PH UNITS
PLATELET # BLD AUTO: 217 K/UL (ref 150–400)
PLATELET MORPHOLOGY: ABNORMAL
POTASSIUM SERPL-SCNC: 3.4 MMOL/L (ref 3.5–5.1)
PROT SERPL-MCNC: 8.1 G/DL (ref 6.4–8.2)
PROT UR QL STRIP: 300
RBC # BLD AUTO: 5.02 M/UL (ref 4.2–5.4)
RBC # UR STRIP: ABNORMAL /UL
RBC #/AREA URNS HPF: ABNORMAL /HPF
RBC MORPH BLD: NORMAL
SARS-COV+SARS-COV-2 AG RESP QL IA.RAPID: NEGATIVE
SODIUM SERPL-SCNC: 142 MMOL/L (ref 136–145)
SP GR UR STRIP: 1.01
SQUAMOUS #/AREA URNS LPF: ABNORMAL /LPF
TRANS CELLS #/AREA URNS LPF: ABNORMAL /LPF
UROBILINOGEN UR STRIP-ACNC: NORMAL MG/DL
WBC # BLD AUTO: 10.3 K/UL (ref 4.5–11)
WBC #/AREA URNS HPF: ABNORMAL /HPF
YEAST #/AREA URNS HPF: ABNORMAL /HPF

## 2022-10-30 PROCEDURE — 81001 URINALYSIS AUTO W/SCOPE: CPT | Performed by: EMERGENCY MEDICINE

## 2022-10-30 PROCEDURE — 80053 COMPREHEN METABOLIC PANEL: CPT | Performed by: EMERGENCY MEDICINE

## 2022-10-30 PROCEDURE — 87428 SARSCOV & INF VIR A&B AG IA: CPT | Performed by: EMERGENCY MEDICINE

## 2022-10-30 PROCEDURE — 99285 EMERGENCY DEPT VISIT HI MDM: CPT | Mod: 25

## 2022-10-30 PROCEDURE — 85025 COMPLETE CBC W/AUTO DIFF WBC: CPT | Performed by: EMERGENCY MEDICINE

## 2022-10-30 PROCEDURE — 25500020 PHARM REV CODE 255: Performed by: EMERGENCY MEDICINE

## 2022-10-30 PROCEDURE — 96361 HYDRATE IV INFUSION ADD-ON: CPT

## 2022-10-30 PROCEDURE — 99284 EMERGENCY DEPT VISIT MOD MDM: CPT | Mod: ,,, | Performed by: EMERGENCY MEDICINE

## 2022-10-30 PROCEDURE — 96375 TX/PRO/DX INJ NEW DRUG ADDON: CPT

## 2022-10-30 PROCEDURE — 36415 COLL VENOUS BLD VENIPUNCTURE: CPT | Performed by: EMERGENCY MEDICINE

## 2022-10-30 PROCEDURE — 96374 THER/PROPH/DIAG INJ IV PUSH: CPT

## 2022-10-30 PROCEDURE — 25000003 PHARM REV CODE 250: Performed by: EMERGENCY MEDICINE

## 2022-10-30 PROCEDURE — 99284 PR EMERGENCY DEPT VISIT,LEVEL IV: ICD-10-PCS | Mod: ,,, | Performed by: EMERGENCY MEDICINE

## 2022-10-30 PROCEDURE — 63600175 PHARM REV CODE 636 W HCPCS: Performed by: EMERGENCY MEDICINE

## 2022-10-30 PROCEDURE — 83690 ASSAY OF LIPASE: CPT | Performed by: EMERGENCY MEDICINE

## 2022-10-30 PROCEDURE — 83735 ASSAY OF MAGNESIUM: CPT | Performed by: EMERGENCY MEDICINE

## 2022-10-30 RX ORDER — MORPHINE SULFATE 4 MG/ML
4 INJECTION, SOLUTION INTRAMUSCULAR; INTRAVENOUS
Status: COMPLETED | OUTPATIENT
Start: 2022-10-30 | End: 2022-10-30

## 2022-10-30 RX ORDER — ONDANSETRON 2 MG/ML
4 INJECTION INTRAMUSCULAR; INTRAVENOUS
Status: COMPLETED | OUTPATIENT
Start: 2022-10-30 | End: 2022-10-30

## 2022-10-30 RX ORDER — ONDANSETRON 4 MG/1
4 TABLET, ORALLY DISINTEGRATING ORAL EVERY 6 HOURS PRN
Qty: 10 TABLET | Refills: 0 | Status: SHIPPED | OUTPATIENT
Start: 2022-10-30 | End: 2023-07-19

## 2022-10-30 RX ADMIN — SODIUM CHLORIDE 1000 ML: 9 INJECTION, SOLUTION INTRAVENOUS at 04:10

## 2022-10-30 RX ADMIN — ONDANSETRON HYDROCHLORIDE 4 MG: 2 SOLUTION INTRAMUSCULAR; INTRAVENOUS at 04:10

## 2022-10-30 RX ADMIN — MORPHINE SULFATE 4 MG: 4 INJECTION, SOLUTION INTRAMUSCULAR; INTRAVENOUS at 04:10

## 2022-10-30 RX ADMIN — IOPAMIDOL 100 ML: 755 INJECTION, SOLUTION INTRAVENOUS at 05:10

## 2022-10-30 NOTE — ED PROVIDER NOTES
Encounter Date: 10/30/2022       History     Chief Complaint   Patient presents with    Nausea     Ems from home - c/o throwing up greenish vomitus tonight - states last time it was her gallbladder    Vomiting     Patient complains of abdominal pain and nausea vomiting and diarrhea.  Symptoms started 2 days ago.  Pain is in the periumbilical area and severe.  She is brought in by mL 5 milligrams Compazine in route.  Patient has some subjective fever and chills. No coughing.  No chest pain. decreased appetite.  Patient says she has had kidney stones in the past but this is not associated with dysuria.  Does not radiate to the groin.  Denies prior history of cholecystectomy or appendectomy or hysterectomy    Review of patient's allergies indicates:   Allergen Reactions    Penicillins      Past Medical History:   Diagnosis Date    HTN (hypertension)      Past Surgical History:   Procedure Laterality Date     SECTION       History reviewed. No pertinent family history.  Social History     Tobacco Use    Smoking status: Some Days    Smokeless tobacco: Never   Substance Use Topics    Alcohol use: Yes    Drug use: Never     Review of Systems   Constitutional:  Negative for fever.   HENT:  Negative for sore throat.    Respiratory:  Negative for shortness of breath.    Cardiovascular:  Negative for chest pain.   Gastrointestinal:  Positive for abdominal pain, diarrhea, nausea and vomiting.   Genitourinary:  Negative for dysuria.   Musculoskeletal:  Negative for back pain.   Skin:  Negative for rash.   Neurological:  Negative for weakness.   Hematological:  Does not bruise/bleed easily.     Physical Exam     Initial Vitals [10/30/22 0421]   BP Pulse Resp Temp SpO2   (!) 158/74 84 18 99 °F (37.2 °C) 98 %      MAP       --         Physical Exam    Nursing note and vitals reviewed.  Constitutional: She appears well-developed and well-nourished.   HENT:   Head: Normocephalic and atraumatic.   Eyes: EOM are normal. Pupils  are equal, round, and reactive to light.   Neck: Neck supple. No thyromegaly present.   Normal range of motion.  Cardiovascular:  Normal rate, regular rhythm, normal heart sounds and intact distal pulses.           No murmur heard.  Pulmonary/Chest: Breath sounds normal. No respiratory distress. She has no wheezes.   Abdominal: Abdomen is soft. Bowel sounds are normal. She exhibits no distension. There is no abdominal tenderness.   No tenderness of the right upper quadrant.  No Mason sign.  Moderate periumbilical and lower abdominal tenderness.   Musculoskeletal:         General: No tenderness or edema. Normal range of motion.      Cervical back: Normal range of motion and neck supple.     Lymphadenopathy:     She has no cervical adenopathy.   Neurological: She is alert and oriented to person, place, and time. She has normal strength. No cranial nerve deficit or sensory deficit.   Skin: Skin is warm and dry. No rash noted.   Psychiatric: She has a normal mood and affect.       Medical Screening Exam   See Full Note    ED Course   Procedures  Labs Reviewed   COMPREHENSIVE METABOLIC PANEL - Abnormal; Notable for the following components:       Result Value    Potassium 3.4 (*)     CO2 20 (*)     Anion Gap 18 (*)     Glucose 126 (*)     All other components within normal limits   URINALYSIS, REFLEX TO URINE CULTURE - Abnormal; Notable for the following components:    Leukocytes, UA Small (*)     Protein,  (*)     Ketones, UA 20 (*)     Blood, UA Small (*)     All other components within normal limits   CBC WITH DIFFERENTIAL - Abnormal; Notable for the following components:    Neutrophils % 85.7 (*)     Lymphocytes % 10.1 (*)     Eosinophils % 0.0 (*)     Immature Granulocytes % 1.1 (*)     Neutrophils, Abs 8.83 (*)     Immature Granulocytes, Absolute 0.11 (*)     All other components within normal limits   MANUAL DIFFERENTIAL - Abnormal; Notable for the following components:    Segmented Neutrophils, Man % 86  (*)     Lymphocytes, Man % 10 (*)     Platelet Morphology Few Large Platelets (*)     All other components within normal limits   URINALYSIS, MICROSCOPIC - Abnormal; Notable for the following components:    Squamous Epithelial Cells, UA Many (*)     Transitional Epithelial Cells, UA Few (*)     All other components within normal limits   LIPASE - Normal   MAGNESIUM - Normal   SARS-COV2 (COVID) W/ FLU ANTIGEN - Normal    Narrative:     Negative SARS-CoV results should not be used as the sole basis for treatment or patient management decisions; negative results should be considered in the context of a patient's recent exposures, history and the presene of clinical signs and symptoms consistent with COVID-19.  Negative results should be treated as presumptive and confirmed by molecular assay, if necessary for patient management.   CBC W/ AUTO DIFFERENTIAL    Narrative:     The following orders were created for panel order CBC auto differential.  Procedure                               Abnormality         Status                     ---------                               -----------         ------                     CBC with Differential[[id number]]        Abnormal            Final result               Manual Differential[[id number]]          Abnormal            Final result                 Please view results for these tests on the individual orders.          Imaging Results              CT Abdomen Pelvis With Contrast (Final result)  Result time 10/30/22 08:07:19      Final result by Nasir Woods DO (10/30/22 08:07:19)                   Impression:      1. No evidence to suggest acute pathology within the abdomen or pelvis.      Electronically signed by: Nasir Woods  Date:    10/30/2022  Time:    08:07               Narrative:    EXAMINATION:  CT ABDOMEN PELVIS WITH CONTRAST    CLINICAL HISTORY:  Abdominal abscess/infection suspected;    COMPARISON:  2012    TECHNIQUE:  CT ABDOMEN PELVIS WITH  CONTRAST    FINDINGS:  Lower lobes: Clear.    Cardiac: No effusion.    Abdomen:    Hepatobiliary/gallbladder: Normal    Spleen: Normal    Pancreas: Normal    Adrenal/Genitourinary system: Normal    Bowel and Mesentery: There is no evidence for bowel obstruction.    Peritoneum: Normal.    Retroperitoneum: No enlarged lymph nodes.    Vasculature: Calcified vascular disease    Reproductive: Normal.    Lymph nodes: No enlarged lymph nodes.    Abdominal wall: Normal.    Osseous structures: Grade 1 anterolisthesis L5 on S1.  Severe degenerative disc disease L5-S1.                                       Medications   sodium chloride 0.9% bolus 1,000 mL (0 mLs Intravenous Stopped 10/30/22 0536)   morphine injection 4 mg (4 mg Intravenous Given 10/30/22 0437)   ondansetron injection 4 mg (4 mg Intravenous Given 10/30/22 0437)   iopamidoL (ISOVUE-370) injection 100 mL (100 mLs Intravenous Given 10/30/22 0535)                 ED Course as of 10/31/22 0113   Sun Oct 30, 2022   0626 CT abdomen pelvis with contrast shows no acute abnormality.  Normal appearing appendix.  No hydronephrosis or perinephric fat stranding. [PK]   0627 Lab work was reassuring.  No significant electrolyte abnormality.  Symptoms consistent with viral illness gastroenteritis.  Influenza COVID negative. repeat lung exam benign.  We will have her start with a prescription of Zofran and clear liquid diet and advance gradually as tolerated [PK]      ED Course User Index  [PK] Aleksandr Mitchell MD          Clinical Impression:   Final diagnoses:  [K52.9] Gastroenteritis (Primary)      ED Disposition Condition    Discharge Stable          ED Prescriptions       Medication Sig Dispense Start Date End Date Auth. Provider    ondansetron (ZOFRAN-ODT) 4 MG TbDL Take 1 tablet (4 mg total) by mouth every 6 (six) hours as needed (nausea). 10 tablet 10/30/2022 -- Aleksandr Mitchell MD          Follow-up Information    None          Aleksandr Mitchell MD  10/31/22  0821

## 2022-10-30 NOTE — DISCHARGE INSTRUCTIONS
Start with a clear liquid diet advance gradually as tolerated.  Use prescription Zofran to help control her nausea.    Return if symptoms are worsening or new symptoms develop

## 2023-01-03 ENCOUNTER — OFFICE VISIT (OUTPATIENT)
Dept: FAMILY MEDICINE | Facility: CLINIC | Age: 48
End: 2023-01-03
Payer: COMMERCIAL

## 2023-01-03 VITALS
OXYGEN SATURATION: 99 % | RESPIRATION RATE: 18 BRPM | SYSTOLIC BLOOD PRESSURE: 128 MMHG | WEIGHT: 210 LBS | HEIGHT: 66 IN | TEMPERATURE: 99 F | HEART RATE: 85 BPM | DIASTOLIC BLOOD PRESSURE: 85 MMHG | BODY MASS INDEX: 33.75 KG/M2

## 2023-01-03 DIAGNOSIS — J32.9 SINUSITIS, UNSPECIFIED CHRONICITY, UNSPECIFIED LOCATION: Primary | ICD-10-CM

## 2023-01-03 DIAGNOSIS — Z20.822 ENCOUNTER FOR LABORATORY TESTING FOR COVID-19 VIRUS: ICD-10-CM

## 2023-01-03 LAB
CTP QC/QA: YES
FLUAV AG NPH QL: NEGATIVE
FLUBV AG NPH QL: NEGATIVE
SARS-COV-2 AG RESP QL IA.RAPID: NEGATIVE

## 2023-01-03 PROCEDURE — 3079F DIAST BP 80-89 MM HG: CPT | Mod: CPTII,,, | Performed by: FAMILY MEDICINE

## 2023-01-03 PROCEDURE — 1160F PR REVIEW ALL MEDS BY PRESCRIBER/CLIN PHARMACIST DOCUMENTED: ICD-10-PCS | Mod: CPTII,,, | Performed by: FAMILY MEDICINE

## 2023-01-03 PROCEDURE — 3008F BODY MASS INDEX DOCD: CPT | Mod: CPTII,,, | Performed by: FAMILY MEDICINE

## 2023-01-03 PROCEDURE — 3008F PR BODY MASS INDEX (BMI) DOCUMENTED: ICD-10-PCS | Mod: CPTII,,, | Performed by: FAMILY MEDICINE

## 2023-01-03 PROCEDURE — 87428 POCT SARS-COV2 (COVID) WITH FLU ANTIGEN: ICD-10-PCS | Mod: QW,,, | Performed by: FAMILY MEDICINE

## 2023-01-03 PROCEDURE — 1160F RVW MEDS BY RX/DR IN RCRD: CPT | Mod: CPTII,,, | Performed by: FAMILY MEDICINE

## 2023-01-03 PROCEDURE — 96372 PR INJECTION,THERAP/PROPH/DIAG2ST, IM OR SUBCUT: ICD-10-PCS | Mod: ,,, | Performed by: FAMILY MEDICINE

## 2023-01-03 PROCEDURE — 3079F PR MOST RECENT DIASTOLIC BLOOD PRESSURE 80-89 MM HG: ICD-10-PCS | Mod: CPTII,,, | Performed by: FAMILY MEDICINE

## 2023-01-03 PROCEDURE — 1159F PR MEDICATION LIST DOCUMENTED IN MEDICAL RECORD: ICD-10-PCS | Mod: CPTII,,, | Performed by: FAMILY MEDICINE

## 2023-01-03 PROCEDURE — 3074F SYST BP LT 130 MM HG: CPT | Mod: CPTII,,, | Performed by: FAMILY MEDICINE

## 2023-01-03 PROCEDURE — 87428 SARSCOV & INF VIR A&B AG IA: CPT | Mod: QW,,, | Performed by: FAMILY MEDICINE

## 2023-01-03 PROCEDURE — 1159F MED LIST DOCD IN RCRD: CPT | Mod: CPTII,,, | Performed by: FAMILY MEDICINE

## 2023-01-03 PROCEDURE — 99213 PR OFFICE/OUTPT VISIT, EST, LEVL III, 20-29 MIN: ICD-10-PCS | Mod: 25,,, | Performed by: FAMILY MEDICINE

## 2023-01-03 PROCEDURE — 96372 THER/PROPH/DIAG INJ SC/IM: CPT | Mod: ,,, | Performed by: FAMILY MEDICINE

## 2023-01-03 PROCEDURE — 99213 OFFICE O/P EST LOW 20 MIN: CPT | Mod: 25,,, | Performed by: FAMILY MEDICINE

## 2023-01-03 PROCEDURE — 3074F PR MOST RECENT SYSTOLIC BLOOD PRESSURE < 130 MM HG: ICD-10-PCS | Mod: CPTII,,, | Performed by: FAMILY MEDICINE

## 2023-01-03 RX ORDER — DEXAMETHASONE SODIUM PHOSPHATE 4 MG/ML
4 INJECTION, SOLUTION INTRA-ARTICULAR; INTRALESIONAL; INTRAMUSCULAR; INTRAVENOUS; SOFT TISSUE
Status: COMPLETED | OUTPATIENT
Start: 2023-01-03 | End: 2023-01-03

## 2023-01-03 RX ORDER — PROMETHAZINE HYDROCHLORIDE 25 MG/1
25 TABLET ORAL EVERY 6 HOURS PRN
Qty: 20 TABLET | Refills: 0 | Status: SHIPPED | OUTPATIENT
Start: 2023-01-03 | End: 2023-07-19

## 2023-01-03 RX ORDER — IBUPROFEN 800 MG/1
800 TABLET ORAL 3 TIMES DAILY PRN
Qty: 20 TABLET | Refills: 0 | Status: SHIPPED | OUTPATIENT
Start: 2023-01-03 | End: 2023-07-19

## 2023-01-03 RX ORDER — PREDNISONE 20 MG/1
20 TABLET ORAL DAILY
Qty: 5 TABLET | Refills: 0 | Status: SHIPPED | OUTPATIENT
Start: 2023-01-03 | End: 2023-01-08

## 2023-01-03 RX ORDER — AZITHROMYCIN 250 MG/1
TABLET, FILM COATED ORAL
Qty: 6 TABLET | Refills: 0 | Status: SHIPPED | OUTPATIENT
Start: 2023-01-03 | End: 2023-07-19

## 2023-01-03 RX ADMIN — DEXAMETHASONE SODIUM PHOSPHATE 4 MG: 4 INJECTION, SOLUTION INTRA-ARTICULAR; INTRALESIONAL; INTRAMUSCULAR; INTRAVENOUS; SOFT TISSUE at 11:01

## 2023-01-03 NOTE — PROGRESS NOTES
Subjective:       Patient ID: Dai Velez is a 47 y.o. female.    Chief Complaint: Hypertension (Pt states headache is very bad. Has high BP and does take BP meds. ) and Headache (Hot flashes )    Pt reports 3 days of frontal headache, pressure, congestion and cough. BP is normal today.     Review of Systems   Constitutional:  Negative for activity change, appetite change, chills, diaphoresis, fatigue, fever and unexpected weight change.   HENT:  Positive for nasal congestion, postnasal drip, rhinorrhea and sinus pressure/congestion. Negative for dental problem, drooling, ear discharge, ear pain, facial swelling, hearing loss, mouth sores, nosebleeds, sneezing, sore throat, tinnitus, trouble swallowing, voice change and goiter.    Eyes:  Negative for photophobia, discharge, itching and visual disturbance.   Respiratory:  Positive for cough. Negative for apnea, choking, chest tightness, shortness of breath, wheezing and stridor.    Cardiovascular:  Negative for chest pain, palpitations, leg swelling and claudication.   Gastrointestinal:  Negative for abdominal distention, abdominal pain, anal bleeding, blood in stool, change in bowel habit, constipation, diarrhea, nausea, vomiting and change in bowel habit.   Endocrine: Negative for cold intolerance, heat intolerance, polydipsia, polyphagia and polyuria.   Genitourinary:  Negative for bladder incontinence, decreased urine volume, difficulty urinating, dysuria, enuresis, flank pain, frequency, hematuria, nocturia, pelvic pain and urgency.   Musculoskeletal:  Negative for arthralgias, back pain, gait problem, joint swelling, leg pain, myalgias, neck pain, neck stiffness and joint deformity.   Integumentary:  Negative for pallor, rash, wound, breast mass and breast tenderness.   Allergic/Immunologic: Negative for environmental allergies, food allergies and immunocompromised state.   Neurological:  Positive for headaches. Negative for dizziness, vertigo, tremors,  seizures, syncope, facial asymmetry, speech difficulty, weakness, light-headedness, numbness, coordination difficulties, memory loss and coordination difficulties.   Hematological:  Negative for adenopathy. Does not bruise/bleed easily.   Psychiatric/Behavioral:  Negative for agitation, behavioral problems, confusion, decreased concentration, dysphoric mood, hallucinations, self-injury, sleep disturbance and suicidal ideas. The patient is not nervous/anxious and is not hyperactive.    Breast: Negative for mass and tenderness      Objective:      Physical Exam  Vitals reviewed.   Constitutional:       Appearance: Normal appearance.   HENT:      Head: Normocephalic and atraumatic.      Right Ear: Tympanic membrane, ear canal and external ear normal.      Left Ear: Tympanic membrane, ear canal and external ear normal.      Nose: Congestion and rhinorrhea present.      Mouth/Throat:      Mouth: Mucous membranes are moist.      Pharynx: Oropharynx is clear. Posterior oropharyngeal erythema present.   Eyes:      Extraocular Movements: Extraocular movements intact.      Conjunctiva/sclera: Conjunctivae normal.      Pupils: Pupils are equal, round, and reactive to light.   Cardiovascular:      Rate and Rhythm: Normal rate and regular rhythm.      Pulses: Normal pulses.      Heart sounds: Normal heart sounds.   Pulmonary:      Effort: Pulmonary effort is normal.      Breath sounds: Normal breath sounds.   Abdominal:      General: Bowel sounds are normal.      Palpations: Abdomen is soft.   Musculoskeletal:         General: Normal range of motion.      Cervical back: Normal range of motion and neck supple.   Skin:     General: Skin is warm and dry.   Neurological:      General: No focal deficit present.      Mental Status: She is alert. Mental status is at baseline.   Psychiatric:         Mood and Affect: Mood normal.         Behavior: Behavior normal.         Thought Content: Thought content normal.         Judgment:  Judgment normal.       Assessment:       1. Sinusitis, unspecified chronicity, unspecified location    2. Encounter for laboratory testing for COVID-19 virus          Plan:     Sinusitis, unspecified chronicity, unspecified location  -     dexAMETHasone injection 4 mg  -     azithromycin (Z-HILARY) 250 MG tablet; Take 2 tablets by mouth on day 1; Take 1 tablet by mouth on days 2-5  Dispense: 6 tablet; Refill: 0  -     predniSONE (DELTASONE) 20 MG tablet; Take 1 tablet (20 mg total) by mouth once daily. for 5 days  Dispense: 5 tablet; Refill: 0  -     promethazine (PHENERGAN) 25 MG tablet; Take 1 tablet (25 mg total) by mouth every 6 (six) hours as needed for Nausea.  Dispense: 20 tablet; Refill: 0  -     ibuprofen (ADVIL,MOTRIN) 800 MG tablet; Take 1 tablet (800 mg total) by mouth 3 (three) times daily as needed for Pain.  Dispense: 20 tablet; Refill: 0    Encounter for laboratory testing for COVID-19 virus  -     POCT SARS-COV2 (COVID) with Flu Antigen

## 2023-07-19 ENCOUNTER — OFFICE VISIT (OUTPATIENT)
Dept: FAMILY MEDICINE | Facility: CLINIC | Age: 48
End: 2023-07-19
Payer: MEDICAID

## 2023-07-19 VITALS
HEART RATE: 56 BPM | TEMPERATURE: 98 F | WEIGHT: 215 LBS | DIASTOLIC BLOOD PRESSURE: 92 MMHG | RESPIRATION RATE: 17 BRPM | SYSTOLIC BLOOD PRESSURE: 142 MMHG | BODY MASS INDEX: 34.55 KG/M2 | HEIGHT: 66 IN | OXYGEN SATURATION: 98 %

## 2023-07-19 DIAGNOSIS — I10 ESSENTIAL HYPERTENSION: Primary | ICD-10-CM

## 2023-07-19 DIAGNOSIS — N91.2 AMENORRHEA: ICD-10-CM

## 2023-07-19 DIAGNOSIS — I10 ELEVATED BLOOD PRESSURE READING IN OFFICE WITH DIAGNOSIS OF HYPERTENSION: ICD-10-CM

## 2023-07-19 PROBLEM — K21.9 GASTROESOPHAGEAL REFLUX DISEASE: Status: ACTIVE | Noted: 2020-03-19

## 2023-07-19 LAB
B-HCG UR QL: NEGATIVE
CTP QC/QA: YES

## 2023-07-19 PROCEDURE — 3008F BODY MASS INDEX DOCD: CPT | Mod: CPTII,,, | Performed by: NURSE PRACTITIONER

## 2023-07-19 PROCEDURE — 1160F PR REVIEW ALL MEDS BY PRESCRIBER/CLIN PHARMACIST DOCUMENTED: ICD-10-PCS | Mod: CPTII,,, | Performed by: NURSE PRACTITIONER

## 2023-07-19 PROCEDURE — 3077F SYST BP >= 140 MM HG: CPT | Mod: CPTII,,, | Performed by: NURSE PRACTITIONER

## 2023-07-19 PROCEDURE — 81025 URINE PREGNANCY TEST: CPT | Mod: RHCUB | Performed by: NURSE PRACTITIONER

## 2023-07-19 PROCEDURE — 3077F PR MOST RECENT SYSTOLIC BLOOD PRESSURE >= 140 MM HG: ICD-10-PCS | Mod: CPTII,,, | Performed by: NURSE PRACTITIONER

## 2023-07-19 PROCEDURE — 99213 OFFICE O/P EST LOW 20 MIN: CPT | Mod: ,,, | Performed by: NURSE PRACTITIONER

## 2023-07-19 PROCEDURE — 1159F PR MEDICATION LIST DOCUMENTED IN MEDICAL RECORD: ICD-10-PCS | Mod: CPTII,,, | Performed by: NURSE PRACTITIONER

## 2023-07-19 PROCEDURE — 99213 PR OFFICE/OUTPT VISIT, EST, LEVL III, 20-29 MIN: ICD-10-PCS | Mod: ,,, | Performed by: NURSE PRACTITIONER

## 2023-07-19 PROCEDURE — 1159F MED LIST DOCD IN RCRD: CPT | Mod: CPTII,,, | Performed by: NURSE PRACTITIONER

## 2023-07-19 PROCEDURE — 3080F PR MOST RECENT DIASTOLIC BLOOD PRESSURE >= 90 MM HG: ICD-10-PCS | Mod: CPTII,,, | Performed by: NURSE PRACTITIONER

## 2023-07-19 PROCEDURE — 1160F RVW MEDS BY RX/DR IN RCRD: CPT | Mod: CPTII,,, | Performed by: NURSE PRACTITIONER

## 2023-07-19 PROCEDURE — 3080F DIAST BP >= 90 MM HG: CPT | Mod: CPTII,,, | Performed by: NURSE PRACTITIONER

## 2023-07-19 PROCEDURE — 3008F PR BODY MASS INDEX (BMI) DOCUMENTED: ICD-10-PCS | Mod: CPTII,,, | Performed by: NURSE PRACTITIONER

## 2023-07-19 RX ORDER — AMLODIPINE BESYLATE 10 MG/1
10 TABLET ORAL NIGHTLY
Qty: 30 TABLET | Refills: 0 | Status: SHIPPED | OUTPATIENT
Start: 2023-07-19 | End: 2023-08-24 | Stop reason: SDUPTHER

## 2023-07-19 RX ORDER — CLONIDINE HYDROCHLORIDE 0.1 MG/1
0.1 TABLET ORAL
Status: COMPLETED | OUTPATIENT
Start: 2023-07-19 | End: 2023-07-19

## 2023-07-19 RX ORDER — HYDROCHLOROTHIAZIDE 25 MG/1
25 TABLET ORAL DAILY
Qty: 30 TABLET | Refills: 0 | Status: SHIPPED | OUTPATIENT
Start: 2023-07-19 | End: 2023-09-06 | Stop reason: SDUPTHER

## 2023-07-19 RX ADMIN — CLONIDINE HYDROCHLORIDE 0.1 MG: 0.1 TABLET ORAL at 11:07

## 2023-07-19 NOTE — PROGRESS NOTES
"Subjective:       Patient ID: Dai Velez is a 47 y.o. female.    Vitals:  height is 5' 6" (1.676 m) and weight is 97.5 kg (215 lb). Her temperature is 98.2 °F (36.8 °C). Her blood pressure is 142/92 (abnormal) and her pulse is 56 (abnormal). Her respiration is 17 and oxygen saturation is 98%.     Chief Complaint: Hypertension (States Elevated BP and not responding to medications ), Headache (Can be related to elevated BP, symptoms comes and goes ), and Medication Refill (Requesting refill Rxs on routine medications for hypertension )    TAMARA is a 46 y/o BF who presents today with c/o headaches that she is concerned are related to her elevated blood pressure. She admits to being out of her blood pressure medications for about a week.    Hypertension  This is a chronic problem. The problem is uncontrolled. Associated symptoms include blurred vision (started yesterday), headaches (x2 days) and peripheral edema. There are no associated agents to hypertension. Risk factors for coronary artery disease include obesity. Past treatments include calcium channel blockers and diuretics. The current treatment provides mild improvement. Compliance problems: does not take medication sas prescribed.      Eyes:  Positive for blurred vision (started yesterday).   Neurological:  Positive for headaches (x2 days).       Objective:      Physical Exam  Vitals reviewed.   Constitutional:       Appearance: Normal appearance. She is obese. She is ill-appearing.   Cardiovascular:      Rate and Rhythm: Normal rate and regular rhythm.   Pulmonary:      Effort: Pulmonary effort is normal.      Breath sounds: Normal breath sounds.   Neurological:      Mental Status: She is alert and oriented to person, place, and time.   Psychiatric:         Mood and Affect: Mood normal.         Behavior: Behavior normal.         Thought Content: Thought content normal.         Judgment: Judgment normal.            Assessment:       1. Essential hypertension    2. " Amenorrhea    3. Elevated blood pressure reading in office with diagnosis of hypertension           Plan:         Essential hypertension  -     amLODIPine (NORVASC) 10 MG tablet; Take 1 tablet (10 mg total) by mouth every evening.  Dispense: 30 tablet; Refill: 0  -     hydroCHLOROthiazide (HYDRODIURIL) 25 MG tablet; Take 1 tablet (25 mg total) by mouth once daily.  Dispense: 30 tablet; Refill: 0    Amenorrhea  -     POCT urine pregnancy    Elevated blood pressure reading in office with diagnosis of hypertension  -     cloNIDine tablet 0.1 mg      Follow up in 2 weeks (on 8/2/2023) for Hypertension.     Future Appointments   Date Time Provider Department Center   8/2/2023 10:40 AM HORTENSIA Guillen [facility] MICAELA Fernández After Visit Summary was printed and given to the patient.     Signature:    HORTENSIA Ribeiro  Family Nurse Practitioner  [facility]    Date of encounter: 7/19/23

## 2023-07-19 NOTE — PATIENT INSTRUCTIONS
Check blood pressure daily and bring log for review to 2 week follow up.  Go to ER if headache worsens, vision changes occur, or if you experience weakness, shortness of breath or chest pain.

## 2023-07-20 NOTE — ASSESSMENT & PLAN NOTE
BP Readings from Last 3 Encounters:   07/19/23 (!) 142/92   01/03/23 128/85   10/30/22 (!) 150/89     Chronic, not controlled  Discussed importance of compliance with current medication regimen  Patient to return to clinic in 2 weeks for blood pressure check with nurse.  Continue current medications and treatment plan

## 2023-07-24 NOTE — SUBJECTIVE & OBJECTIVE
Interval History:     Review of Systems   Constitutional:  Negative for appetite change, fatigue and fever.   HENT:  Negative for congestion, hearing loss and trouble swallowing.    Respiratory:  Negative for chest tightness, shortness of breath and wheezing.    Cardiovascular:  Negative for chest pain and palpitations.   Gastrointestinal:  Positive for abdominal pain. Negative for constipation and nausea.   Genitourinary:  Positive for dysuria.   Musculoskeletal:  Negative for back pain and neck stiffness.   Skin:  Negative for pallor and rash.   Neurological:  Negative for dizziness, speech difficulty and headaches.   Psychiatric/Behavioral:  Negative for confusion and suicidal ideas.    Objective:     Vital Signs (Most Recent):  Temp: 98.4 °F (36.9 °C) (05/13/22 1600)  Pulse: 60 (05/13/22 1600)  Resp: (!) 22 (05/13/22 1600)  BP: (!) 173/98 (05/13/22 1600)  SpO2: 99 % (05/13/22 1600)   Vital Signs (24h Range):  Temp:  [98.4 °F (36.9 °C)-100.6 °F (38.1 °C)] 98.4 °F (36.9 °C)  Pulse:  [60-72] 60  Resp:  [18-22] 22  SpO2:  [94 %-100 %] 99 %  BP: (142-173)/() 173/98     Weight: 93.7 kg (206 lb 9.6 oz)  Body mass index is 33.35 kg/m².    Intake/Output Summary (Last 24 hours) at 5/13/2022 2252  Last data filed at 5/13/2022 0500  Gross per 24 hour   Intake 340 ml   Output --   Net 340 ml        Physical Exam  Vitals reviewed.   Constitutional:       General: She is not in acute distress.     Appearance: She is obese.   Eyes:      Pupils: Pupils are equal, round, and reactive to light.   Cardiovascular:      Rate and Rhythm: Normal rate and regular rhythm.      Pulses: Normal pulses.   Pulmonary:      Effort: Pulmonary effort is normal. No respiratory distress.      Breath sounds: Normal breath sounds. No wheezing.   Abdominal:      General: Bowel sounds are normal. There is no distension.      Tenderness: There is no abdominal tenderness.   Skin:     General: Skin is warm.   Neurological:      General: No focal  Physical Therapy Evaluation    Visit Type: Initial Evaluation  Visit: 1  Referring Provider: Jus Mcneil DPM  Medical Diagnosis (from order): Diagnosis Information    Diagnosis  726.73 (ICD-9-CM) - M77.31 (ICD-10-CM) - Calcaneal spur of right foot  729.5 (ICD-9-CM) - M79.671 (ICD-10-CM) - Right foot pain  719.50 (ICD-9-CM) - M25.60 (ICD-10-CM) - Restriction of joint motion       Treatment Diagnosis: right foot - increased pain/symptoms, impaired range of motion, impaired strength, increased swelling, impaired gait, impaired activity tolerance and impaired muscle length/flexibility.  Onset  - Date of onset:  about 3 months ago   Chart reviewed at time of initial evaluation (relevant co-morbidities, allergies, tests and medications listed):   Reviewed       SUBJECTIVE                                                                                                               Patient reports straining the back of her right foot about three months ago.   Pain is worse with standing and walking.  Pain is worse as the day goes on.  Walks with a limp and has pain with pushing off on right.      Pain / Symptoms  - Pain rating (out of 10): Current: 5 ; Best: 2; Worst: 8  - Quality / Description: ache, sore, sharp  - Alleviating Factors: avoiding movement in involved area, change in position, rest  - Progression since onset: worsening    Function:   Limitations / Exacerbation Factors:   - Patient reports pain, difficulty and increased time with function reported below.  - meal/food prep, driving/riding in a vehicle, grocery shopping, house/yard work, standing tasks, standing, squatting/lifting and walking, community distances, household distances, stairs, walking quickly as required to cross a street/exit a building rapidly  Prior Level of Function: pain free ADLs and IADLs. no limitation in involved extremity,    Patient Goals: decreased pain, increased strength, increased motion and return to sport/leisure  activities.    Prior treatment  - no therapies  - Discharged from hospital, home health, or skilled nursing facility in last 30 days: no  Home Environment   - Patient lives with: significant other  - Type of home: multiple level home  - Assistance available: intermittent  - Denies 2 or more falls or an unexplained fall with injury in the last year.  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     Range of Motion (ROM)   (degrees unless noted; active unless noted; norms in ( ); negative=lacking to 0, positive=beyond 0)  WNL: LLE, RLE, except as noted  Ankle:   - Dorsiflexion (20):      • Left:  15       • Right:  10  Pain    Strength  (out of 5 unless noted, standard test position unless noted)   5/5: LLE, RLE, except as noted  Ankle:    - Plantar Flexion:        • Right: 4-, pain    - Single leg heel raises (reps):         • Left: 10        • Right: 0  Comments / Details: Single leg stance left >5 sec, right unable          Palpation  Marked tenderness right distal achilles and gastrocs      Special Tests  Ankle:  - Morrow's Test:  Right: negative               Treatment    Ultrasound (78082)  - Location: right distal achilles  - Position: prone  - Duty Cycle: 50%  - Frequency: 2 Mhz  - Intensity (w/cm2): 1.5  - Duration: 8 minutes    Results: no change in symptoms immediately following  Reaction: no adverse reaction to treatment      Therapeutic Exercise  Objective findings discussed   Seated towel stretch for calf and achilles  Standing calf stretch   Heel and toe raises   Focus on gentle toe off gait pattern   Instructed on icing and elevation for pain and swelling control     Skilled input: verbal instruction/cues, tactile instruction/cues, posture correction, demonstration and as detailed  deficit present.      Mental Status: She is alert, oriented to person, place, and time and easily aroused. Mental status is at baseline.   Psychiatric:         Mood and Affect: Mood normal.         Behavior: Behavior normal.       Significant Labs: All pertinent labs within the past 24 hours have been reviewed.  BMP:   Recent Labs   Lab 05/13/22  0359   GLU 91      K 3.5      CO2 22   BUN 7   CREATININE 0.66   CALCIUM 8.4*       CBC:   Recent Labs   Lab 05/12/22  0944 05/13/22 0359   WBC 7.53 5.78   HGB 13.2 12.9   HCT 39.9 40.6    176       CMP:   Recent Labs   Lab 05/12/22  0728 05/13/22  0359    138   K 3.5 3.5    107   CO2 19* 22   GLU 84 91   BUN 7 7   CREATININE 0.56 0.66   CALCIUM 8.8 8.4*   PROT 7.1  --    ALBUMIN 3.2*  --    BILITOT 0.8  --    ALKPHOS 72  --    AST 37  --    ALT 32  --    ANIONGAP 21* 13   EGFRNONAA 123 102         Significant Imaging: I have reviewed all pertinent imaging results/findings within the past 24 hours.    Imaging Results              XR ABDOMEN, ACUTE 2 OR MORE VIEWS WITH CHEST (Final result)  Result time 05/12/22 07:35:34      Final result by Alphonse Martel MD (05/12/22 07:35:34)                   Impression:      No abnormality identified.      Electronically signed by: Alphonse Martel  Date:    05/12/2022  Time:    07:35               Narrative:    EXAMINATION:  XR ABDOMEN ACUTE 2 OR MORE VIEWS WITH CHEST    CLINICAL HISTORY:  abdominal pain;    TECHNIQUE:  PA chest radiograph, supine and erect views of the abdomen    COMPARISON:  None    FINDINGS:  Lungs clear.  Bowel gas pattern normal.  No abnormal calcifications.  No pneumoperitoneum.                                    Intake/Output - Last 3 Shifts         05/12 0700 05/13 0659 05/13 0700 05/14 0659    I.V. (mL/kg) 240 (2.6)     IV Piggyback 100     Total Intake(mL/kg) 340 (3.6)     Net +340                 Microbiology Results (last 7 days)       Procedure Component Value Units Date/Time     Verigene Gram-positive Blood Culture Test [[id number]]  (Abnormal) Collected: 05/12/22 0031    Order Status: Completed Specimen: Blood Updated: 05/13/22 0119     Verigene Result Methicillin Sensitive Staphylococcus epidermidis     Blood culture [[id number]] Collected: 05/12/22 2337    Order Status: Sent Specimen: Blood Updated: 05/12/22 2344    Blood culture [[id number]] Collected: 05/12/22 2337    Order Status: Sent Specimen: Blood Updated: 05/12/22 2344    Blood culture (site 2) [[id number]]  (Abnormal) Collected: 05/12/22 0031    Order Status: Completed Specimen: Blood Updated: 05/12/22 2232     Gram Stain Result Gram positive cocci     Comment: From Anaerobe bottle       Blood culture (site 1) [[id number]] Collected: 05/12/22 0031    Order Status: Resulted Specimen: Blood Updated: 05/12/22 0037               above    Writer verbally educated and received verbal consent for hand placement, positioning of patient, and techniques to be performed today from patient for therapist position for techniques and hand placement and palpation for techniques as described above and how they are pertinent to the patient's plan of care.  Home Exercise Program  Seated towel stretch for calf and achilles  Standing calf stretch   Heel and toe raises       ASSESSMENT                                                                                                          69 year old patient has reported functional limitations listed above impacted by signs and symptoms consistent with treatment diagnosis below.  Treatment Diagnosis:   - Involved: right foot.  - Symptoms/impairments: increased pain/symptoms, impaired range of motion, impaired strength, increased swelling, impaired gait, impaired activity tolerance and impaired muscle length/flexibility.    Patient presents with right foot pain and stiffness causing difficulty and compensation with functional mobility tasks and gait.     Prognosis: Patient will benefit from skilled therapy. very good.  Predicted patient presentation: Low (stable) - Patient comorbidities and complexities, as defined above, will have little effect on progress for prescribed plan of care.  Education:   - Present and ready to learn: patient    PLAN                                                                                                                         The following skilled interventions to be implemented to achieve goals listed below:  Neuromuscular Re-Education (82417)  Therapeutic Activity (31339)  Therapeutic Exercise (80438)  Manual Therapy (67190)  Gait Training (48417)  Ultrasound/Phonophoresis (96546)    Frequency / Duration  2 times per week tapering as patient progresses for 8 weeks for an estimated total of 12 visits    Patient involved in and agreed to plan of care and  goals.    Suggestions for next session as indicated: Progress per plan of care      Goals  Long Term Goals: to be met by end of plan of care  1. Patient will stand >30 minutes for household tasks and ADL without compensation or right foot pain >3/10.   2. Patient will ambulate >20 minutes for grocery shopping with minimal right lower extremity compensation and without pain >3/10.  3. Patient will ascend and descend stairs using step through pattern with minimal compensation or pain for safe access throughout home and community.   4. Patient will be independent with progressed and modified HEP.       Therapy procedure time and total treatment time can be found documented on the Time Entry flowsheet

## 2023-08-24 ENCOUNTER — OFFICE VISIT (OUTPATIENT)
Dept: FAMILY MEDICINE | Facility: CLINIC | Age: 48
End: 2023-08-24
Payer: MEDICAID

## 2023-08-24 VITALS
SYSTOLIC BLOOD PRESSURE: 101 MMHG | HEART RATE: 76 BPM | TEMPERATURE: 99 F | DIASTOLIC BLOOD PRESSURE: 67 MMHG | OXYGEN SATURATION: 99 % | HEIGHT: 66 IN | BODY MASS INDEX: 35.2 KG/M2 | RESPIRATION RATE: 20 BRPM | WEIGHT: 219 LBS

## 2023-08-24 DIAGNOSIS — I10 ESSENTIAL HYPERTENSION: ICD-10-CM

## 2023-08-24 PROCEDURE — 3074F SYST BP LT 130 MM HG: CPT | Mod: CPTII,,, | Performed by: NURSE PRACTITIONER

## 2023-08-24 PROCEDURE — 99213 OFFICE O/P EST LOW 20 MIN: CPT | Mod: ,,, | Performed by: NURSE PRACTITIONER

## 2023-08-24 PROCEDURE — 3008F BODY MASS INDEX DOCD: CPT | Mod: CPTII,,, | Performed by: NURSE PRACTITIONER

## 2023-08-24 PROCEDURE — 1159F MED LIST DOCD IN RCRD: CPT | Mod: CPTII,,, | Performed by: NURSE PRACTITIONER

## 2023-08-24 PROCEDURE — 3078F DIAST BP <80 MM HG: CPT | Mod: CPTII,,, | Performed by: NURSE PRACTITIONER

## 2023-08-24 PROCEDURE — 99213 PR OFFICE/OUTPT VISIT, EST, LEVL III, 20-29 MIN: ICD-10-PCS | Mod: ,,, | Performed by: NURSE PRACTITIONER

## 2023-08-24 PROCEDURE — 3008F PR BODY MASS INDEX (BMI) DOCUMENTED: ICD-10-PCS | Mod: CPTII,,, | Performed by: NURSE PRACTITIONER

## 2023-08-24 PROCEDURE — 3074F PR MOST RECENT SYSTOLIC BLOOD PRESSURE < 130 MM HG: ICD-10-PCS | Mod: CPTII,,, | Performed by: NURSE PRACTITIONER

## 2023-08-24 PROCEDURE — 3078F PR MOST RECENT DIASTOLIC BLOOD PRESSURE < 80 MM HG: ICD-10-PCS | Mod: CPTII,,, | Performed by: NURSE PRACTITIONER

## 2023-08-24 PROCEDURE — 1159F PR MEDICATION LIST DOCUMENTED IN MEDICAL RECORD: ICD-10-PCS | Mod: CPTII,,, | Performed by: NURSE PRACTITIONER

## 2023-08-24 RX ORDER — AMLODIPINE BESYLATE 10 MG/1
10 TABLET ORAL NIGHTLY
Qty: 30 TABLET | Refills: 5 | Status: SHIPPED | OUTPATIENT
Start: 2023-08-24 | End: 2023-09-06 | Stop reason: SDUPTHER

## 2023-08-24 NOTE — PROGRESS NOTES
Subjective:       Patient ID: Dai Velez is a 47 y.o. female.    Chief Complaint: Medication Refill (C/o chucho feet swelling for 2 days)    Medication refill    Medication Refill  Pertinent negatives include no abdominal pain, change in bowel habit, chest pain, congestion, coughing, fatigue, fever, headaches, nausea, neck pain, rash, sore throat, vomiting or weakness.   Review of Systems   Constitutional:  Negative for appetite change, fatigue and fever.   HENT:  Negative for nasal congestion, ear pain and sore throat.    Eyes:  Negative for pain, discharge and itching.   Respiratory:  Negative for cough and shortness of breath.    Cardiovascular:  Negative for chest pain and leg swelling.   Gastrointestinal:  Negative for abdominal pain, change in bowel habit, nausea, vomiting and change in bowel habit.   Musculoskeletal:  Negative for back pain, gait problem and neck pain.   Integumentary:  Negative for rash and wound.   Allergic/Immunologic: Negative for immunocompromised state.   Neurological:  Negative for dizziness, weakness and headaches.   All other systems reviewed and are negative.      Objective:      Physical Exam  Vitals and nursing note reviewed.   Constitutional:       General: She is not in acute distress.     Appearance: Normal appearance. She is not ill-appearing, toxic-appearing or diaphoretic.   HENT:      Head: Normocephalic.      Nose: Nose normal.      Mouth/Throat:      Mouth: Mucous membranes are moist.   Eyes:      General: No scleral icterus.        Right eye: No discharge.         Left eye: No discharge.      Extraocular Movements: Extraocular movements intact.      Conjunctiva/sclera: Conjunctivae normal.      Pupils: Pupils are equal, round, and reactive to light.   Cardiovascular:      Rate and Rhythm: Normal rate and regular rhythm.      Pulses: Normal pulses.      Heart sounds: Normal heart sounds. No murmur heard.  Pulmonary:      Effort: Pulmonary effort is normal. No respiratory  distress.      Breath sounds: Normal breath sounds. No wheezing, rhonchi or rales.   Musculoskeletal:         General: Normal range of motion.      Cervical back: Neck supple. No tenderness.      Right lower le+ Edema present.      Left lower le+ Edema present.   Lymphadenopathy:      Cervical: No cervical adenopathy.   Skin:     General: Skin is warm and dry.      Capillary Refill: Capillary refill takes less than 2 seconds.      Findings: No rash.   Neurological:      Mental Status: She is alert and oriented to person, place, and time.   Psychiatric:         Mood and Affect: Mood normal.         Behavior: Behavior normal.         Thought Content: Thought content normal.         Judgment: Judgment normal.          Assessment:       No diagnosis found.    Plan:   There are no diagnoses linked to this encounter.     Risks, benefits, and side effects were discussed with the patient. All questions were answered to the fullest satisfaction of the patient, and pt verbalized understanding and agreement to treatment plan. Pt was to call with any new or worsening symptoms, or present to the ER

## 2023-09-06 ENCOUNTER — OFFICE VISIT (OUTPATIENT)
Dept: PRIMARY CARE CLINIC | Facility: CLINIC | Age: 48
End: 2023-09-06
Payer: MEDICAID

## 2023-09-06 VITALS
HEIGHT: 66 IN | TEMPERATURE: 98 F | RESPIRATION RATE: 18 BRPM | SYSTOLIC BLOOD PRESSURE: 120 MMHG | DIASTOLIC BLOOD PRESSURE: 76 MMHG | WEIGHT: 220 LBS | OXYGEN SATURATION: 98 % | HEART RATE: 76 BPM | BODY MASS INDEX: 35.36 KG/M2

## 2023-09-06 DIAGNOSIS — Z11.4 SCREENING FOR HIV (HUMAN IMMUNODEFICIENCY VIRUS): ICD-10-CM

## 2023-09-06 DIAGNOSIS — Z12.4 SCREENING FOR MALIGNANT NEOPLASM OF CERVIX: ICD-10-CM

## 2023-09-06 DIAGNOSIS — I10 ESSENTIAL HYPERTENSION: ICD-10-CM

## 2023-09-06 DIAGNOSIS — Z00.00 ENCOUNTER FOR GENERAL ADULT MEDICAL EXAMINATION WITHOUT ABNORMAL FINDINGS: Primary | ICD-10-CM

## 2023-09-06 DIAGNOSIS — Z12.39 ENCOUNTER FOR SCREENING FOR MALIGNANT NEOPLASM OF BREAST, UNSPECIFIED SCREENING MODALITY: ICD-10-CM

## 2023-09-06 DIAGNOSIS — Z13.1 SCREENING FOR DIABETES MELLITUS: ICD-10-CM

## 2023-09-06 DIAGNOSIS — Z12.11 SCREENING FOR MALIGNANT NEOPLASM OF COLON: ICD-10-CM

## 2023-09-06 DIAGNOSIS — Z12.11 SCREENING FOR COLON CANCER: Primary | ICD-10-CM

## 2023-09-06 DIAGNOSIS — N32.81 OVERACTIVE BLADDER: ICD-10-CM

## 2023-09-06 DIAGNOSIS — Z11.59 NEED FOR HEPATITIS C SCREENING TEST: ICD-10-CM

## 2023-09-06 DIAGNOSIS — E87.6 HYPOKALEMIA: ICD-10-CM

## 2023-09-06 PROBLEM — N20.0 KIDNEY STONE: Status: RESOLVED | Noted: 2022-07-06 | Resolved: 2023-09-06

## 2023-09-06 PROBLEM — N39.0 SEPSIS DUE TO GRAM-NEGATIVE UTI: Status: RESOLVED | Noted: 2022-05-11 | Resolved: 2023-09-06

## 2023-09-06 PROBLEM — A41.50 SEPSIS DUE TO GRAM-NEGATIVE UTI: Status: RESOLVED | Noted: 2022-05-11 | Resolved: 2023-09-06

## 2023-09-06 PROCEDURE — 1159F MED LIST DOCD IN RCRD: CPT | Mod: CPTII,,, | Performed by: NURSE PRACTITIONER

## 2023-09-06 PROCEDURE — 3074F PR MOST RECENT SYSTOLIC BLOOD PRESSURE < 130 MM HG: ICD-10-PCS | Mod: CPTII,,, | Performed by: NURSE PRACTITIONER

## 2023-09-06 PROCEDURE — 99396 PREV VISIT EST AGE 40-64: CPT | Mod: ,,, | Performed by: NURSE PRACTITIONER

## 2023-09-06 PROCEDURE — 1160F RVW MEDS BY RX/DR IN RCRD: CPT | Mod: CPTII,,, | Performed by: NURSE PRACTITIONER

## 2023-09-06 PROCEDURE — 99396 PR PREVENTIVE VISIT,EST,40-64: ICD-10-PCS | Mod: ,,, | Performed by: NURSE PRACTITIONER

## 2023-09-06 PROCEDURE — 1160F PR REVIEW ALL MEDS BY PRESCRIBER/CLIN PHARMACIST DOCUMENTED: ICD-10-PCS | Mod: CPTII,,, | Performed by: NURSE PRACTITIONER

## 2023-09-06 PROCEDURE — 3074F SYST BP LT 130 MM HG: CPT | Mod: CPTII,,, | Performed by: NURSE PRACTITIONER

## 2023-09-06 PROCEDURE — 3008F PR BODY MASS INDEX (BMI) DOCUMENTED: ICD-10-PCS | Mod: CPTII,,, | Performed by: NURSE PRACTITIONER

## 2023-09-06 PROCEDURE — 3008F BODY MASS INDEX DOCD: CPT | Mod: CPTII,,, | Performed by: NURSE PRACTITIONER

## 2023-09-06 PROCEDURE — 1159F PR MEDICATION LIST DOCUMENTED IN MEDICAL RECORD: ICD-10-PCS | Mod: CPTII,,, | Performed by: NURSE PRACTITIONER

## 2023-09-06 PROCEDURE — 3078F PR MOST RECENT DIASTOLIC BLOOD PRESSURE < 80 MM HG: ICD-10-PCS | Mod: CPTII,,, | Performed by: NURSE PRACTITIONER

## 2023-09-06 PROCEDURE — 3078F DIAST BP <80 MM HG: CPT | Mod: CPTII,,, | Performed by: NURSE PRACTITIONER

## 2023-09-06 RX ORDER — OXYBUTYNIN CHLORIDE 10 MG/1
10 TABLET, EXTENDED RELEASE ORAL DAILY
Qty: 90 TABLET | Refills: 3 | Status: SHIPPED | OUTPATIENT
Start: 2023-09-06

## 2023-09-06 RX ORDER — AMLODIPINE BESYLATE 10 MG/1
10 TABLET ORAL NIGHTLY
Qty: 90 TABLET | Refills: 3 | Status: SHIPPED | OUTPATIENT
Start: 2023-09-06

## 2023-09-06 RX ORDER — POTASSIUM CHLORIDE 20 MEQ/1
20 TABLET, EXTENDED RELEASE ORAL DAILY
Qty: 90 TABLET | Refills: 3 | Status: SHIPPED | OUTPATIENT
Start: 2023-09-06

## 2023-09-06 RX ORDER — HYDROCHLOROTHIAZIDE 25 MG/1
25 TABLET ORAL DAILY
Qty: 90 TABLET | Refills: 3 | Status: SHIPPED | OUTPATIENT
Start: 2023-09-06

## 2023-09-07 RX ORDER — POLYETHYLENE GLYCOL 3350, SODIUM SULFATE ANHYDROUS, SODIUM BICARBONATE, SODIUM CHLORIDE, POTASSIUM CHLORIDE 236; 22.74; 6.74; 5.86; 2.97 G/4L; G/4L; G/4L; G/4L; G/4L
4 POWDER, FOR SOLUTION ORAL ONCE
Qty: 4000 ML | Refills: 0 | Status: SHIPPED | OUTPATIENT
Start: 2023-09-07 | End: 2023-09-07

## 2023-10-11 ENCOUNTER — HOSPITAL ENCOUNTER (EMERGENCY)
Facility: HOSPITAL | Age: 48
Discharge: HOME | End: 2023-10-11
Payer: MEDICAID

## 2023-10-11 VITALS
HEART RATE: 70 BPM | OXYGEN SATURATION: 100 % | WEIGHT: 213 LBS | HEIGHT: 64 IN | DIASTOLIC BLOOD PRESSURE: 78 MMHG | BODY MASS INDEX: 36.37 KG/M2 | SYSTOLIC BLOOD PRESSURE: 163 MMHG | TEMPERATURE: 98 F | RESPIRATION RATE: 16 BRPM

## 2023-10-11 DIAGNOSIS — A08.4 VIRAL GASTROENTERITIS: Primary | ICD-10-CM

## 2023-10-11 LAB
ALBUMIN SERPL BCP-MCNC: 3.2 G/DL (ref 3.5–5)
ALBUMIN/GLOB SERPL: 0.9 {RATIO}
ALP SERPL-CCNC: 72 U/L (ref 39–100)
ALT SERPL W P-5'-P-CCNC: 23 U/L (ref 13–56)
ANION GAP SERPL CALCULATED.3IONS-SCNC: 10 MMOL/L (ref 7–16)
AST SERPL W P-5'-P-CCNC: 28 U/L (ref 15–37)
BASOPHILS # BLD AUTO: 0.08 K/UL (ref 0–0.2)
BASOPHILS NFR BLD AUTO: 0.8 % (ref 0–1)
BILIRUB SERPL-MCNC: 0.3 MG/DL (ref ?–1.2)
BUN SERPL-MCNC: 5 MG/DL (ref 7–18)
BUN/CREAT SERPL: 8 (ref 6–20)
CALCIUM SERPL-MCNC: 8.2 MG/DL (ref 8.5–10.1)
CHLORIDE SERPL-SCNC: 108 MMOL/L (ref 98–107)
CO2 SERPL-SCNC: 26 MMOL/L (ref 21–32)
CREAT SERPL-MCNC: 0.66 MG/DL (ref 0.55–1.02)
DIFFERENTIAL METHOD BLD: ABNORMAL
EGFR (NO RACE VARIABLE) (RUSH/TITUS): 108 ML/MIN/1.73M2
EOSINOPHIL # BLD AUTO: 0.1 K/UL (ref 0–0.5)
EOSINOPHIL NFR BLD AUTO: 1 % (ref 1–4)
EOSINOPHIL NFR BLD MANUAL: 1 % (ref 1–4)
ERYTHROCYTE [DISTWIDTH] IN BLOOD BY AUTOMATED COUNT: 13.7 % (ref 11.5–14.5)
GLOBULIN SER-MCNC: 3.7 G/DL (ref 2–4)
GLUCOSE SERPL-MCNC: 89 MG/DL (ref 74–106)
HCT VFR BLD AUTO: 43.9 % (ref 38–47)
HGB BLD-MCNC: 14.2 G/DL (ref 12–16)
IMM GRANULOCYTES # BLD AUTO: 0.6 K/UL (ref 0–0.04)
IMM GRANULOCYTES NFR BLD: 5.8 % (ref 0–0.4)
LYMPHOCYTES # BLD AUTO: 1.96 K/UL (ref 1–4.8)
LYMPHOCYTES NFR BLD AUTO: 19 % (ref 27–41)
LYMPHOCYTES NFR BLD MANUAL: 19 % (ref 27–41)
MCH RBC QN AUTO: 29 PG (ref 27–31)
MCHC RBC AUTO-ENTMCNC: 32.3 G/DL (ref 32–36)
MCV RBC AUTO: 89.6 FL (ref 80–96)
MONOCYTES # BLD AUTO: 0.53 K/UL (ref 0–0.8)
MONOCYTES NFR BLD AUTO: 5.1 % (ref 2–6)
MONOCYTES NFR BLD MANUAL: 7 % (ref 2–6)
MPC BLD CALC-MCNC: 11.9 FL (ref 9.4–12.4)
MYELOCYTES NFR BLD MANUAL: 3 %
NEUTROPHILS # BLD AUTO: 7.06 K/UL (ref 1.8–7.7)
NEUTROPHILS NFR BLD AUTO: 68.3 % (ref 53–65)
NEUTS BAND NFR BLD MANUAL: 1 % (ref 1–5)
NEUTS SEG NFR BLD MANUAL: 69 % (ref 50–62)
NRBC # BLD AUTO: 0 X10E3/UL
NRBC, AUTO (.00): 0 %
PLATELET # BLD AUTO: 222 K/UL (ref 150–400)
PLATELET MORPHOLOGY: ABNORMAL
POTASSIUM SERPL-SCNC: 3.8 MMOL/L (ref 3.5–5.1)
PROT SERPL-MCNC: 6.9 G/DL (ref 6.4–8.2)
RBC # BLD AUTO: 4.9 M/UL (ref 4.2–5.4)
RBC MORPH BLD: NORMAL
SODIUM SERPL-SCNC: 140 MMOL/L (ref 136–145)
WBC # BLD AUTO: 10.33 K/UL (ref 4.5–11)

## 2023-10-11 PROCEDURE — 96361 HYDRATE IV INFUSION ADD-ON: CPT

## 2023-10-11 PROCEDURE — 85025 COMPLETE CBC W/AUTO DIFF WBC: CPT | Performed by: NURSE PRACTITIONER

## 2023-10-11 PROCEDURE — 99284 EMERGENCY DEPT VISIT MOD MDM: CPT | Mod: 25

## 2023-10-11 PROCEDURE — 99284 EMERGENCY DEPT VISIT MOD MDM: CPT | Mod: ,,, | Performed by: NURSE PRACTITIONER

## 2023-10-11 PROCEDURE — 96372 THER/PROPH/DIAG INJ SC/IM: CPT | Performed by: NURSE PRACTITIONER

## 2023-10-11 PROCEDURE — 80053 COMPREHEN METABOLIC PANEL: CPT | Performed by: NURSE PRACTITIONER

## 2023-10-11 PROCEDURE — 99284 PR EMERGENCY DEPT VISIT,LEVEL IV: ICD-10-PCS | Mod: ,,, | Performed by: NURSE PRACTITIONER

## 2023-10-11 PROCEDURE — 25000003 PHARM REV CODE 250: Performed by: NURSE PRACTITIONER

## 2023-10-11 PROCEDURE — 63600175 PHARM REV CODE 636 W HCPCS: Performed by: NURSE PRACTITIONER

## 2023-10-11 PROCEDURE — 96360 HYDRATION IV INFUSION INIT: CPT

## 2023-10-11 RX ORDER — DICYCLOMINE HYDROCHLORIDE 10 MG/ML
10 INJECTION INTRAMUSCULAR
Status: COMPLETED | OUTPATIENT
Start: 2023-10-11 | End: 2023-10-11

## 2023-10-11 RX ORDER — ONDANSETRON 4 MG/1
4 TABLET, ORALLY DISINTEGRATING ORAL EVERY 6 HOURS PRN
Qty: 10 TABLET | Refills: 0 | Status: SHIPPED | OUTPATIENT
Start: 2023-10-11

## 2023-10-11 RX ADMIN — DICYCLOMINE HYDROCHLORIDE 10 MG: 10 INJECTION INTRAMUSCULAR at 11:10

## 2023-10-11 RX ADMIN — SODIUM CHLORIDE 1000 ML: 9 INJECTION, SOLUTION INTRAVENOUS at 11:10

## 2023-10-11 NOTE — ED PROVIDER NOTES
Encounter Date: 10/11/2023       History     Chief Complaint   Patient presents with    Nausea    Vomiting     Vomited twice.  Got to feeling bad after getting to work      47-year-old female presents to the emergency department to be evaluated for nausea, vomiting and diarrhea that began today while she was at work.    The history is provided by the patient.   Emesis   This is a new problem. The current episode started today. The problem occurs 2 - 4 times per day. Associated symptoms include abdominal pain and diarrhea. Pertinent negatives include no arthralgias, no chills, no cough, no fever, no headaches, no myalgias, no sweats and no URI.     Review of patient's allergies indicates:   Allergen Reactions    Penicillins      Past Medical History:   Diagnosis Date    Acute pyelonephritis     HTN (hypertension)     Kidney stone 2022    Sepsis due to gram-negative UTI 2022     Past Surgical History:   Procedure Laterality Date     SECTION       History reviewed. No pertinent family history.  Social History     Tobacco Use    Smoking status: Some Days     Current packs/day: 0.50     Types: Cigarettes    Smokeless tobacco: Never    Tobacco comments:     States haven't been smoking since went back to work    Substance Use Topics    Alcohol use: Yes    Drug use: Yes     Types: Marijuana     Review of Systems   Constitutional:  Negative for chills and fever.   Respiratory:  Negative for cough.    Gastrointestinal:  Positive for abdominal pain, diarrhea, nausea and vomiting.   Musculoskeletal:  Negative for arthralgias and myalgias.   Neurological:  Negative for headaches.   All other systems reviewed and are negative.      Physical Exam     Initial Vitals [10/11/23 1052]   BP Pulse Resp Temp SpO2   (!) 142/79 (!) 54 16 97.6 °F (36.4 °C) 100 %      MAP       --         Physical Exam    Vitals reviewed.  Constitutional: She appears well-developed and well-nourished.   Neck: Neck supple.   Cardiovascular:   Normal rate and regular rhythm.           Pulmonary/Chest: Breath sounds normal.   Abdominal: Abdomen is soft. Bowel sounds are normal. She exhibits no distension and no mass. There is no abdominal tenderness. There is no rebound and no guarding.   Musculoskeletal:         General: Normal range of motion.      Cervical back: Neck supple.     Neurological: She is alert and oriented to person, place, and time. She has normal strength. GCS score is 15. GCS eye subscore is 4. GCS verbal subscore is 5. GCS motor subscore is 6.   Skin: Skin is warm and dry. Capillary refill takes less than 2 seconds.   Psychiatric: She has a normal mood and affect.         Medical Screening Exam   See Full Note    ED Course   Procedures  Labs Reviewed   COMPREHENSIVE METABOLIC PANEL - Abnormal; Notable for the following components:       Result Value    Chloride 108 (*)     BUN 5 (*)     Calcium 8.2 (*)     Albumin 3.2 (*)     All other components within normal limits   CBC WITH DIFFERENTIAL - Abnormal; Notable for the following components:    Neutrophils % 68.3 (*)     Lymphocytes % 19.0 (*)     Immature Granulocytes % 5.8 (*)     Immature Granulocytes, Absolute 0.60 (*)     All other components within normal limits   MANUAL DIFFERENTIAL - Abnormal; Notable for the following components:    Segmented Neutrophils, Man % 69 (*)     Lymphocytes, Man % 19 (*)     Monocytes, Man % 7 (*)     Platelet Morphology Platelet Clumping (*)     All other components within normal limits    Narrative:     Few large plts   CBC W/ AUTO DIFFERENTIAL    Narrative:     The following orders were created for panel order CBC auto differential.  Procedure                               Abnormality         Status                     ---------                               -----------         ------                     CBC with Differential[[id number]]       Abnormal            Final result               Manual Differential[[id number]]         Abnormal             Final result                 Please view results for these tests on the individual orders.          Imaging Results    None          Medications   sodium chloride 0.9% bolus 1,000 mL 1,000 mL (1,000 mLs Intravenous New Bag 10/11/23 1112)   dicyclomine injection 10 mg (10 mg Intramuscular Given 10/11/23 1140)     Medical Decision Making  47-year-old female presents to the emergency department to be evaluated for nausea, vomiting and diarrhea that began today while she was at work.  I ordered labs and personally reviewed them.    Patient was managed in the ED with IV bolus.    The response to treatment was improved.    Diagnosis: viral gastroenteritis  Patient was discharged in stable condition.  Detailed return precautions discussed.      Amount and/or Complexity of Data Reviewed  Labs: ordered.    Risk  Prescription drug management.                               Clinical Impression:   Final diagnoses:  [A08.4] Viral gastroenteritis (Primary)        ED Disposition Condition    Discharge Stable          ED Prescriptions       Medication Sig Dispense Start Date End Date Auth. Provider    ondansetron (ZOFRAN-ODT) 4 MG TbDL Take 1 tablet (4 mg total) by mouth every 6 (six) hours as needed. 10 tablet 10/11/2023 -- Heather Betts FNP          Follow-up Information    None          Heather Betts FNP  10/11/23 1245

## 2023-10-11 NOTE — Clinical Note
"Dai "Daidenita Velez was seen and treated in our emergency department on 10/11/2023.  She may return to work on 10/13/2023.       If you have any questions or concerns, please don't hesitate to call.      Heather Betts, NABILAP"

## 2023-10-26 ENCOUNTER — OFFICE VISIT (OUTPATIENT)
Dept: OBSTETRICS AND GYNECOLOGY | Facility: CLINIC | Age: 48
End: 2023-10-26
Payer: MEDICAID

## 2023-10-26 VITALS
HEIGHT: 64 IN | WEIGHT: 218.81 LBS | SYSTOLIC BLOOD PRESSURE: 128 MMHG | BODY MASS INDEX: 37.36 KG/M2 | DIASTOLIC BLOOD PRESSURE: 80 MMHG

## 2023-10-26 DIAGNOSIS — L02.91 CUTANEOUS ABSCESS, UNSPECIFIED SITE: ICD-10-CM

## 2023-10-26 DIAGNOSIS — Z01.419 WELL WOMAN EXAM WITH ROUTINE GYNECOLOGICAL EXAM: Primary | ICD-10-CM

## 2023-10-26 DIAGNOSIS — Z12.4 SCREENING FOR MALIGNANT NEOPLASM OF CERVIX: ICD-10-CM

## 2023-10-26 DIAGNOSIS — L02.419 AXILLARY ABSCESS: ICD-10-CM

## 2023-10-26 PROCEDURE — 99999PBSHW PR PBB SHADOW TECHNICAL ONLY FILED TO HB: ICD-10-PCS | Mod: PBBFAC,,,

## 2023-10-26 PROCEDURE — 99396 PREV VISIT EST AGE 40-64: CPT | Mod: S$PBB,25,, | Performed by: OBSTETRICS & GYNECOLOGY

## 2023-10-26 PROCEDURE — 3074F SYST BP LT 130 MM HG: CPT | Mod: CPTII,,, | Performed by: OBSTETRICS & GYNECOLOGY

## 2023-10-26 PROCEDURE — 10060 PR DRAIN SKIN ABSCESS SIMPLE: ICD-10-PCS | Mod: S$PBB,,, | Performed by: OBSTETRICS & GYNECOLOGY

## 2023-10-26 PROCEDURE — 88142 CYTOPATH C/V THIN LAYER: CPT | Mod: TC,GCY | Performed by: OBSTETRICS & GYNECOLOGY

## 2023-10-26 PROCEDURE — 10060 I&D ABSCESS SIMPLE/SINGLE: CPT | Mod: S$PBB,,, | Performed by: OBSTETRICS & GYNECOLOGY

## 2023-10-26 PROCEDURE — 3008F PR BODY MASS INDEX (BMI) DOCUMENTED: ICD-10-PCS | Mod: CPTII,,, | Performed by: OBSTETRICS & GYNECOLOGY

## 2023-10-26 PROCEDURE — 87077 CULTURE AEROBIC IDENTIFY: CPT | Mod: ,,, | Performed by: CLINICAL MEDICAL LABORATORY

## 2023-10-26 PROCEDURE — 87624 HUMAN PAPILLOMAVIRUS (HPV): ICD-10-PCS | Mod: ,,, | Performed by: CLINICAL MEDICAL LABORATORY

## 2023-10-26 PROCEDURE — 87186 SC STD MICRODIL/AGAR DIL: CPT | Mod: ,,, | Performed by: CLINICAL MEDICAL LABORATORY

## 2023-10-26 PROCEDURE — 1159F PR MEDICATION LIST DOCUMENTED IN MEDICAL RECORD: ICD-10-PCS | Mod: CPTII,,, | Performed by: OBSTETRICS & GYNECOLOGY

## 2023-10-26 PROCEDURE — 3044F HG A1C LEVEL LT 7.0%: CPT | Mod: CPTII,,, | Performed by: OBSTETRICS & GYNECOLOGY

## 2023-10-26 PROCEDURE — 3074F PR MOST RECENT SYSTOLIC BLOOD PRESSURE < 130 MM HG: ICD-10-PCS | Mod: CPTII,,, | Performed by: OBSTETRICS & GYNECOLOGY

## 2023-10-26 PROCEDURE — 87077 CULTURE, WOUND: ICD-10-PCS | Mod: ,,, | Performed by: CLINICAL MEDICAL LABORATORY

## 2023-10-26 PROCEDURE — 96372 THER/PROPH/DIAG INJ SC/IM: CPT | Mod: PBBFAC | Performed by: OBSTETRICS & GYNECOLOGY

## 2023-10-26 PROCEDURE — 99396 PR PREVENTIVE VISIT,EST,40-64: ICD-10-PCS | Mod: S$PBB,25,, | Performed by: OBSTETRICS & GYNECOLOGY

## 2023-10-26 PROCEDURE — 99999PBSHW PR PBB SHADOW TECHNICAL ONLY FILED TO HB: Mod: PBBFAC,,,

## 2023-10-26 PROCEDURE — 3079F PR MOST RECENT DIASTOLIC BLOOD PRESSURE 80-89 MM HG: ICD-10-PCS | Mod: CPTII,,, | Performed by: OBSTETRICS & GYNECOLOGY

## 2023-10-26 PROCEDURE — 99214 OFFICE O/P EST MOD 30 MIN: CPT | Mod: PBBFAC,25 | Performed by: OBSTETRICS & GYNECOLOGY

## 2023-10-26 PROCEDURE — 87186 CULTURE, WOUND: ICD-10-PCS | Mod: ,,, | Performed by: CLINICAL MEDICAL LABORATORY

## 2023-10-26 PROCEDURE — 3008F BODY MASS INDEX DOCD: CPT | Mod: CPTII,,, | Performed by: OBSTETRICS & GYNECOLOGY

## 2023-10-26 PROCEDURE — 1159F MED LIST DOCD IN RCRD: CPT | Mod: CPTII,,, | Performed by: OBSTETRICS & GYNECOLOGY

## 2023-10-26 PROCEDURE — 87070 CULTURE OTHR SPECIMN AEROBIC: CPT | Mod: ,,, | Performed by: CLINICAL MEDICAL LABORATORY

## 2023-10-26 PROCEDURE — 3044F PR MOST RECENT HEMOGLOBIN A1C LEVEL <7.0%: ICD-10-PCS | Mod: CPTII,,, | Performed by: OBSTETRICS & GYNECOLOGY

## 2023-10-26 PROCEDURE — 87624 HPV HI-RISK TYP POOLED RSLT: CPT | Mod: ,,, | Performed by: CLINICAL MEDICAL LABORATORY

## 2023-10-26 PROCEDURE — 87070 CULTURE, WOUND: ICD-10-PCS | Mod: ,,, | Performed by: CLINICAL MEDICAL LABORATORY

## 2023-10-26 PROCEDURE — 10060 I&D ABSCESS SIMPLE/SINGLE: CPT | Mod: PBBFAC | Performed by: OBSTETRICS & GYNECOLOGY

## 2023-10-26 PROCEDURE — 3079F DIAST BP 80-89 MM HG: CPT | Mod: CPTII,,, | Performed by: OBSTETRICS & GYNECOLOGY

## 2023-10-26 RX ORDER — SULFAMETHOXAZOLE AND TRIMETHOPRIM 800; 160 MG/1; MG/1
1 TABLET ORAL 2 TIMES DAILY
Qty: 20 TABLET | Refills: 0 | Status: SHIPPED | OUTPATIENT
Start: 2023-10-26

## 2023-10-26 RX ORDER — CEFTRIAXONE 1 G/1
1 INJECTION, POWDER, FOR SOLUTION INTRAMUSCULAR; INTRAVENOUS
Status: COMPLETED | OUTPATIENT
Start: 2023-10-26 | End: 2023-10-26

## 2023-10-26 RX ADMIN — CEFTRIAXONE SODIUM 1 G: 1 INJECTION, POWDER, FOR SOLUTION INTRAMUSCULAR; INTRAVENOUS at 03:10

## 2023-10-26 NOTE — PROGRESS NOTES
Subjective:       Patient ID: Dai Velez is a 47 y.o. female.    Chief Complaint: Well Woman (Here for check up and pap. Pt referred by FNP Vasu. She also has a knot in right axillary that is tender for past 3-4 days. )    Presents for routine yearly check.      No specific gyn complaints.  However she states over the last 2-3 days she has had some tenderness and swelling of the right adnexa.      History reveals that Approximally 2 years ago she had I and D of this area secondary to subcutaneous abscess.          Review of Systems      Objective:      Physical Exam  Exam conducted with a chaperone present.   HENT:      Head: Normocephalic.      Nose: Nose normal.   Eyes:      Pupils: Pupils are equal, round, and reactive to light.   Cardiovascular:      Rate and Rhythm: Normal rate.   Pulmonary:      Effort: Pulmonary effort is normal.      Breath sounds: Normal breath sounds.   Chest:      Comments: Breasts without palpable masses no skin retraction or nipple dimpling.  Mammography screening yearly recommended.      Mammography scheduled January 2024 by primary care provider.      Abdominal:      General: Abdomen is flat.      Palpations: Abdomen is soft.   Genitourinary:     General: Normal vulva.      Vagina: Normal.      Cervix: Normal.      Uterus: Normal.       Adnexa: Right adnexa normal and left adnexa normal.      Rectum: Normal.      Comments: External normal vault normal cervix normal to appearance Pap taken bimanual exam revealed uterus retroflexed but normal size rectovaginal exam unremarkable hemoccult was negative colonoscopy versus colon screening with colon guard discussed.  Patient has colonoscopy scheduled February 2024 by primary care provider  Musculoskeletal:         General: Normal range of motion.      Cervical back: Full passive range of motion without pain, normal range of motion and neck supple.   Skin:     General: Skin is dry.      Comments: Examination of right axilla did reveal  Approximally a 3 to 4 cm swelling with slight fluctuance.  Mild tenderness.    However it is noted that there were old healed excisions from previous drainage.      After evaluation I recommended an I and D for drainage.  Patient understood and agreed permit signed.      Thereafter the right axilla was prepped with Betadine block with 1% lidocaine.  Overlying area was nicked with scalpel Approximally 1 cm.  Small amount of pus was expressed    I then used hemostat to probe wound.  Very fibrous subcutaneous indurated tissue was noted.  It was expanded as much as possible although difficult secondary to fibrous nature of tissue.  Thereafter small amount of wick Betadine iodoform gauze was placed.         Neurological:      General: No focal deficit present.      Mental Status: She is alert.   Psychiatric:         Attention and Perception: Attention normal.         Mood and Affect: Mood normal.       Assessment:       1. Well woman exam with routine gynecological exam    2. Screening for malignant neoplasm of cervix    3. Axillary abscess    4. Cutaneous abscess, unspecified site        Plan:       Patient Instructions   Discussed incisional care instructions.  She will irrigate wick with hydrogen peroxide twice daily.  Remove wick in 2 days.      Continue irrigation with hydrogen peroxide.      She will be given Rocephin 1 g in the office.  Will place on Bactrim DS b.i.d..    Instructed to return to the emergency room if any chills fever or worsening swelling of axilla noted.      We discussed yearly screening mammography.    Colonoscopy screening as scheduled.    Continue routine follow-up with primary care provider.      Follow-up with me on Monday

## 2023-10-26 NOTE — PATIENT INSTRUCTIONS
Discussed incisional care instructions.  She will irrigate wick with hydrogen peroxide twice daily.  Remove wick in 2 days.      Continue irrigation with hydrogen peroxide.      She will be given Rocephin 1 g in the office.  Will place on Bactrim DS b.i.d..    Instructed to return to the emergency room if any chills fever or worsening swelling of axilla noted.      We discussed yearly screening mammography.    Colonoscopy screening as scheduled.    Continue routine follow-up with primary care provider.      Follow-up with me on Monday

## 2023-10-27 LAB
GH SERPL-MCNC: NORMAL NG/ML
INSULIN SERPL-ACNC: NORMAL U[IU]/ML
LAB AP CLINICAL INFORMATION: NORMAL
LAB AP GYN INTERPRETATION: NEGATIVE
LAB AP PAP DISCLAIMER COMMENTS: NORMAL
RENIN PLAS-CCNC: NORMAL NG/ML/H

## 2023-10-28 LAB — MICROORGANISM SPEC CULT: ABNORMAL

## 2023-10-30 ENCOUNTER — OFFICE VISIT (OUTPATIENT)
Dept: OBSTETRICS AND GYNECOLOGY | Facility: CLINIC | Age: 48
End: 2023-10-30
Payer: MEDICAID

## 2023-10-30 VITALS — SYSTOLIC BLOOD PRESSURE: 130 MMHG | DIASTOLIC BLOOD PRESSURE: 76 MMHG

## 2023-10-30 DIAGNOSIS — L02.411 ABSCESS OF AXILLA, RIGHT: Primary | ICD-10-CM

## 2023-10-30 PROCEDURE — 99024 POSTOP FOLLOW-UP VISIT: CPT | Mod: ,,, | Performed by: OBSTETRICS & GYNECOLOGY

## 2023-10-30 PROCEDURE — 3075F SYST BP GE 130 - 139MM HG: CPT | Mod: CPTII,,, | Performed by: OBSTETRICS & GYNECOLOGY

## 2023-10-30 PROCEDURE — 99024 PR POST-OP FOLLOW-UP VISIT: ICD-10-PCS | Mod: ,,, | Performed by: OBSTETRICS & GYNECOLOGY

## 2023-10-30 PROCEDURE — 3044F HG A1C LEVEL LT 7.0%: CPT | Mod: CPTII,,, | Performed by: OBSTETRICS & GYNECOLOGY

## 2023-10-30 PROCEDURE — 3075F PR MOST RECENT SYSTOLIC BLOOD PRESS GE 130-139MM HG: ICD-10-PCS | Mod: CPTII,,, | Performed by: OBSTETRICS & GYNECOLOGY

## 2023-10-30 PROCEDURE — 3078F DIAST BP <80 MM HG: CPT | Mod: CPTII,,, | Performed by: OBSTETRICS & GYNECOLOGY

## 2023-10-30 PROCEDURE — 99213 OFFICE O/P EST LOW 20 MIN: CPT | Mod: PBBFAC | Performed by: OBSTETRICS & GYNECOLOGY

## 2023-10-30 PROCEDURE — 3044F PR MOST RECENT HEMOGLOBIN A1C LEVEL <7.0%: ICD-10-PCS | Mod: CPTII,,, | Performed by: OBSTETRICS & GYNECOLOGY

## 2023-10-30 PROCEDURE — 3078F PR MOST RECENT DIASTOLIC BLOOD PRESSURE < 80 MM HG: ICD-10-PCS | Mod: CPTII,,, | Performed by: OBSTETRICS & GYNECOLOGY

## 2023-10-30 NOTE — PROGRESS NOTES
Subjective:       Patient ID: Dai Velez is a 47 y.o. female.    Chief Complaint: Follow-up (Here for 4 day f/u after I&D of right axillary abscess. Pt states she is much better. )    Presents today for follow-up of axillary abscess.      It was drained last Thursday.  Wick was placed.  She removes this on Saturday.    She was given Rocephin in the office followed by Bactrim DS.    She states much less tenderness.  No further drainage noted.    Follow-up      Review of Systems      Objective:      Physical Exam  Musculoskeletal:      Comments: Right axillary abscess incision has completely sealed.  No drainage at present.  Marked resolution of abscess with only mild subcutaneous induration.  No significant tenderness.         Assessment:       No diagnosis found.    Plan:       Patient Instructions   Discussed with patient that subcutaneous induration in fibrous texture will probably persist since this is chronic infection which is reoccurred.      Nevertheless no signs of acute infection at present.    She will finish out Bactrim DS.  To notify me or present to the emergency room if any further problems arise.

## 2023-10-30 NOTE — PATIENT INSTRUCTIONS
Discussed with patient that subcutaneous induration in fibrous texture will probably persist since this is chronic infection which is reoccurred.      Nevertheless no signs of acute infection at present.    She will finish out Bactrim DS.  To notify me or present to the emergency room if any further problems arise.

## 2023-11-01 LAB
HPV 16: POSITIVE
HPV 18: NEGATIVE
HPV OTHER: NEGATIVE

## 2023-12-22 DIAGNOSIS — R87.810 HUMAN PAPILLOMAVIRUS (HPV) TYPE 16 DNA DETECTED IN CERVICAL SPECIMEN: Primary | ICD-10-CM

## 2024-02-12 ENCOUNTER — PROCEDURE VISIT (OUTPATIENT)
Dept: OBSTETRICS AND GYNECOLOGY | Facility: CLINIC | Age: 49
End: 2024-02-12
Payer: MEDICAID

## 2024-02-12 DIAGNOSIS — R87.810 HUMAN PAPILLOMAVIRUS (HPV) TYPE 16 DNA DETECTED IN CERVICAL SPECIMEN: ICD-10-CM

## 2024-02-12 PROCEDURE — 99499 UNLISTED E&M SERVICE: CPT | Mod: S$PBB,,, | Performed by: OBSTETRICS & GYNECOLOGY

## 2024-02-13 DIAGNOSIS — N87.9 CERVICAL DYSPLASIA: Primary | ICD-10-CM

## 2025-01-15 ENCOUNTER — HOSPITAL ENCOUNTER (EMERGENCY)
Facility: HOSPITAL | Age: 50
Discharge: HOME | End: 2025-01-16
Attending: FAMILY MEDICINE
Payer: MEDICAID

## 2025-01-15 DIAGNOSIS — I10 ESSENTIAL HYPERTENSION: ICD-10-CM

## 2025-01-15 DIAGNOSIS — M79.605 PAIN IN BOTH LOWER EXTREMITIES: Primary | ICD-10-CM

## 2025-01-15 DIAGNOSIS — M79.604 PAIN IN BOTH LOWER EXTREMITIES: Primary | ICD-10-CM

## 2025-01-15 LAB
ANION GAP SERPL CALCULATED.3IONS-SCNC: 9 MMOL/L (ref 7–16)
BASOPHILS # BLD AUTO: 0.05 K/UL (ref 0–0.2)
BASOPHILS NFR BLD AUTO: 0.5 % (ref 0–1)
BUN SERPL-MCNC: 7 MG/DL (ref 10–20)
BUN/CREAT SERPL: 9 (ref 6–20)
CALCIUM SERPL-MCNC: 8.2 MG/DL (ref 8.4–10.2)
CHLORIDE SERPL-SCNC: 113 MMOL/L (ref 98–107)
CO2 SERPL-SCNC: 26 MMOL/L (ref 22–29)
CREAT SERPL-MCNC: 0.74 MG/DL (ref 0.55–1.02)
DIFFERENTIAL METHOD BLD: ABNORMAL
EGFR (NO RACE VARIABLE) (RUSH/TITUS): 99 ML/MIN/1.73M2
EOSINOPHIL # BLD AUTO: 0.11 K/UL (ref 0–0.5)
EOSINOPHIL NFR BLD AUTO: 1.1 % (ref 1–4)
ERYTHROCYTE [DISTWIDTH] IN BLOOD BY AUTOMATED COUNT: 13.2 % (ref 11.5–14.5)
GLUCOSE SERPL-MCNC: 107 MG/DL (ref 74–100)
HCT VFR BLD AUTO: 41.5 % (ref 38–47)
HGB BLD-MCNC: 13.1 G/DL (ref 12–16)
IMM GRANULOCYTES # BLD AUTO: 0.08 K/UL (ref 0–0.04)
IMM GRANULOCYTES NFR BLD: 0.8 % (ref 0–0.4)
LYMPHOCYTES # BLD AUTO: 1.73 K/UL (ref 1–4.8)
LYMPHOCYTES NFR BLD AUTO: 17 % (ref 27–41)
MCH RBC QN AUTO: 28.5 PG (ref 27–31)
MCHC RBC AUTO-ENTMCNC: 31.6 G/DL (ref 32–36)
MCV RBC AUTO: 90.2 FL (ref 80–96)
MONOCYTES # BLD AUTO: 0.7 K/UL (ref 0–0.8)
MONOCYTES NFR BLD AUTO: 6.9 % (ref 2–6)
MPC BLD CALC-MCNC: 12.7 FL (ref 9.4–12.4)
NEUTROPHILS # BLD AUTO: 7.53 K/UL (ref 1.8–7.7)
NEUTROPHILS NFR BLD AUTO: 73.7 % (ref 53–65)
NRBC # BLD AUTO: 0 X10E3/UL
NRBC, AUTO (.00): 0 %
PLATELET # BLD AUTO: 187 K/UL (ref 150–400)
POTASSIUM SERPL-SCNC: 3.6 MMOL/L (ref 3.5–5.1)
RBC # BLD AUTO: 4.6 M/UL (ref 4.2–5.4)
SODIUM SERPL-SCNC: 144 MMOL/L (ref 136–145)
WBC # BLD AUTO: 10.2 K/UL (ref 4.5–11)

## 2025-01-15 PROCEDURE — 36415 COLL VENOUS BLD VENIPUNCTURE: CPT | Performed by: NURSE PRACTITIONER

## 2025-01-15 PROCEDURE — 83880 ASSAY OF NATRIURETIC PEPTIDE: CPT | Performed by: NURSE PRACTITIONER

## 2025-01-15 PROCEDURE — 80048 BASIC METABOLIC PNL TOTAL CA: CPT | Performed by: NURSE PRACTITIONER

## 2025-01-15 PROCEDURE — 85025 COMPLETE CBC W/AUTO DIFF WBC: CPT | Performed by: NURSE PRACTITIONER

## 2025-01-15 PROCEDURE — 99283 EMERGENCY DEPT VISIT LOW MDM: CPT

## 2025-01-15 PROCEDURE — 25000003 PHARM REV CODE 250: Performed by: NURSE PRACTITIONER

## 2025-01-15 RX ORDER — CLONIDINE HYDROCHLORIDE 0.1 MG/1
0.1 TABLET ORAL
Status: COMPLETED | OUTPATIENT
Start: 2025-01-15 | End: 2025-01-15

## 2025-01-15 RX ADMIN — CLONIDINE HYDROCHLORIDE 0.1 MG: 0.1 TABLET ORAL at 10:01

## 2025-01-15 NOTE — Clinical Note
"Dai "Daidenita Velez was seen and treated in our emergency department on 1/15/2025.  She may return to work on 01/17/2025.       If you have any questions or concerns, please don't hesitate to call.      Shana Farfan, FNP"

## 2025-01-15 NOTE — Clinical Note
"Dai "Daidenita Velez was seen and treated in our emergency department on 1/15/2025.  She may return to work on 01/20/2025.       If you have any questions or concerns, please don't hesitate to call.      Rene Wheatley, DO"

## 2025-01-16 VITALS
SYSTOLIC BLOOD PRESSURE: 160 MMHG | HEART RATE: 71 BPM | TEMPERATURE: 98 F | BODY MASS INDEX: 38.24 KG/M2 | RESPIRATION RATE: 18 BRPM | OXYGEN SATURATION: 98 % | WEIGHT: 224 LBS | DIASTOLIC BLOOD PRESSURE: 95 MMHG | HEIGHT: 64 IN

## 2025-01-16 LAB — NT-PROBNP SERPL-MCNC: 29 PG/ML (ref 1–125)

## 2025-01-16 RX ORDER — AMLODIPINE BESYLATE 10 MG/1
10 TABLET ORAL NIGHTLY
Qty: 90 TABLET | Refills: 3 | Status: SHIPPED | OUTPATIENT
Start: 2025-01-16 | End: 2025-03-17

## 2025-01-16 NOTE — ED PROVIDER NOTES
Encounter Date: 1/15/2025       History     Chief Complaint   Patient presents with    Leg Pain     49 year old female presents to ED with complaint of leg pain. Patient states she was at work on yesterday and started having pain to bilateral lower extremities. She states she continued to work through the pain. She states on today while at work, legs started hurting again and she had to leave work early. Patient reports she has to stand at work for 8 hours during her shifts. On this evening, she states she had swelling to bilateral legs with increased difficulty putting on her shoes. Denies chest pain, shortness of breath. Denies use of drugs, alcohol or tobacco. She reports history of taking diuretics for blood pressure management that she was taken off of. Patient reports she has not been taking medications and needs refills.     The history is provided by the patient and medical records.     Review of patient's allergies indicates:   Allergen Reactions    Penicillins      Past Medical History:   Diagnosis Date    Acute pyelonephritis     HTN (hypertension)     Kidney stone 2022    Sepsis due to gram-negative UTI 2022     Past Surgical History:   Procedure Laterality Date     SECTION       No family history on file.  Social History     Tobacco Use    Smoking status: Former     Current packs/day: 0.50     Types: Cigarettes    Smokeless tobacco: Never    Tobacco comments:     States haven't been smoking since went back to work    Substance Use Topics    Alcohol use: Yes    Drug use: Yes     Types: Marijuana     Review of Systems   Constitutional:  Negative for chills and fever.   HENT:  Negative for sinus pressure and sinus pain.    Eyes:  Negative for photophobia and visual disturbance.   Respiratory:  Negative for cough and shortness of breath.    Cardiovascular:  Positive for leg swelling. Negative for chest pain.   Gastrointestinal:  Negative for nausea and vomiting.   Musculoskeletal:  Positive  for arthralgias and back pain.   Skin:  Negative for color change and wound.   Neurological:  Negative for dizziness and weakness.   Hematological:  Negative for adenopathy. Does not bruise/bleed easily.   Psychiatric/Behavioral:  Negative for agitation and confusion.    All other systems reviewed and are negative.      Physical Exam     Initial Vitals [01/15/25 2224]   BP Pulse Resp Temp SpO2   (!) 194/97 83 20 98.1 °F (36.7 °C) 98 %      MAP       --         Physical Exam    Nursing note and vitals reviewed.  Constitutional: She appears well-developed and well-nourished.   HENT:   Head: Normocephalic and atraumatic.   Eyes: Pupils are equal, round, and reactive to light.   Neck: Neck supple.   Normal range of motion.  Cardiovascular:  Normal rate and regular rhythm.           No murmur heard.  Pulmonary/Chest: She has no wheezes. She has no rhonchi.   Abdominal: Abdomen is soft. She exhibits no distension. There is no abdominal tenderness.   Musculoskeletal:         General: No tenderness.      Cervical back: Normal range of motion and neck supple.      Right lower leg: Edema present.      Left lower leg: Edema present.      Comments: Mild skin tenting     Lymphadenopathy:     She has no cervical adenopathy.   Neurological: She is alert and oriented to person, place, and time. No cranial nerve deficit or sensory deficit.   Skin: Skin is warm and dry. Capillary refill takes less than 2 seconds.   Psychiatric: She has a normal mood and affect. Thought content normal.         Medical Screening Exam   See Full Note    ED Course   Procedures  Labs Reviewed   BASIC METABOLIC PANEL - Abnormal       Result Value    Sodium 144      Potassium 3.6      Chloride 113 (*)     CO2 26      Anion Gap 9      Glucose 107 (*)     BUN 7 (*)     Creatinine 0.74      BUN/Creatinine Ratio 9      Calcium 8.2 (*)     eGFR 99     CBC WITH DIFFERENTIAL - Abnormal    WBC 10.20      RBC 4.60      Hemoglobin 13.1      Hematocrit 41.5      MCV  90.2      MCH 28.5      MCHC 31.6 (*)     RDW 13.2      Platelet Count 187      MPV 12.7 (*)     Neutrophils % 73.7 (*)     Lymphocytes % 17.0 (*)     Monocytes % 6.9 (*)     Eosinophils % 1.1      Basophils % 0.5      Immature Granulocytes % 0.8 (*)     nRBC, Auto 0.0      Neutrophils, Abs 7.53      Lymphocytes, Absolute 1.73      Monocytes, Absolute 0.70      Eosinophils, Absolute 0.11      Basophils, Absolute 0.05      Immature Granulocytes, Absolute 0.08 (*)     nRBC, Absolute 0.00      Diff Type Auto     NT-PRO NATRIURETIC PEPTIDE - Normal    ProBNP 29     CBC W/ AUTO DIFFERENTIAL    Narrative:     The following orders were created for panel order CBC auto differential.  Procedure                               Abnormality         Status                     ---------                               -----------         ------                     CBC with Differential[[id number]]       Abnormal            Final result                 Please view results for these tests on the individual orders.          Imaging Results    None          Medications   cloNIDine tablet 0.1 mg (0.1 mg Oral Given 1/15/25 9666)     Medical Decision Making  49 year old female presents to ED with complaint of leg pain. Patient states she was at work on yesterday and started having pain to bilateral lower extremities. She states she continued to work through the pain. She states on today while at work, legs started hurting again and she had to leave work early. Patient reports she has to stand at work for 8 hours during her shifts. On this evening, she states she had swelling to bilateral legs with increased difficulty putting on her shoes. Denies chest pain, shortness of breath. Denies use of drugs, alcohol or tobacco. She reports history of taking diuretics for blood pressure management that she was taken off of. Patient reports she has not been taking medications and needs refills.     Labs obtained/reviewed  Care transferred to   [place] at midnight    Amount and/or Complexity of Data Reviewed  Labs: ordered.    Risk  Prescription drug management.                          Medical Decision Making:   Initial Assessment:   49 year old female presents to ED with complaint of leg pain. Patient states she was at work on yesterday and started having pain to bilateral lower extremities. She states she continued to work through the pain. She states on today while at work, legs started hurting again and she had to leave work early. Patient reports she has to stand at work for 8 hours during her shifts. On this evening, she states she had swelling to bilateral legs with increased difficulty putting on her shoes. Denies chest pain, shortness of breath. Denies use of drugs, alcohol or tobacco. She reports history of taking diuretics for blood pressure management that she was taken off of. Patient reports she has not been taking medications and needs refills.     The history is provided by the patient and medical records.     Differential Diagnosis:   Follow-up primary care provider take Norvasc for blood pressure             Clinical Impression:   Final diagnoses:  [M79.604, M79.605] Pain in both lower extremities (Primary)        ED Disposition Condition    Discharge Stable          ED Prescriptions       Medication Sig Dispense Start Date End Date Auth. Provider    amLODIPine (NORVASC) 10 MG tablet Take 1 tablet (10 mg total) by mouth every evening. 90 tablet 1/16/2025 3/17/2025 Rene Wheatley, DO          Follow-up Information    None          Shana Farfan, P  01/16/25 2051

## 2025-01-16 NOTE — ED TRIAGE NOTES
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 States it got worse while working tonight.

## 2025-04-20 ENCOUNTER — HOSPITAL ENCOUNTER (EMERGENCY)
Facility: HOSPITAL | Age: 50
Discharge: HOME | End: 2025-04-21
Attending: EMERGENCY MEDICINE
Payer: MEDICAID

## 2025-04-20 VITALS
OXYGEN SATURATION: 96 % | WEIGHT: 229.06 LBS | DIASTOLIC BLOOD PRESSURE: 99 MMHG | TEMPERATURE: 98 F | HEIGHT: 64 IN | HEART RATE: 72 BPM | RESPIRATION RATE: 18 BRPM | BODY MASS INDEX: 39.11 KG/M2 | SYSTOLIC BLOOD PRESSURE: 146 MMHG

## 2025-04-20 DIAGNOSIS — K62.5 RECTAL BLEEDING: ICD-10-CM

## 2025-04-20 DIAGNOSIS — K59.00 CONSTIPATION, UNSPECIFIED CONSTIPATION TYPE: Primary | ICD-10-CM

## 2025-04-20 PROCEDURE — 99284 EMERGENCY DEPT VISIT MOD MDM: CPT

## 2025-04-20 NOTE — Clinical Note
"Dai Maurer" Agustin was seen and treated in our emergency department on 4/20/2025.  She may return to work on 04/21/2025.       If you have any questions or concerns, please don't hesitate to call.      oumar oswald RN    "

## 2025-04-21 LAB
ALBUMIN SERPL BCP-MCNC: 3.4 G/DL (ref 3.5–5)
ALBUMIN/GLOB SERPL: 1.4 {RATIO}
ALP SERPL-CCNC: 64 U/L (ref 40–150)
ALT SERPL W P-5'-P-CCNC: 21 U/L
ANION GAP SERPL CALCULATED.3IONS-SCNC: 15 MMOL/L (ref 7–16)
APTT PPP: 28.2 SECONDS (ref 25.2–37.3)
AST SERPL W P-5'-P-CCNC: 27 U/L (ref 11–45)
BASOPHILS # BLD AUTO: 0.05 K/UL (ref 0–0.2)
BASOPHILS NFR BLD AUTO: 0.7 % (ref 0–1)
BILIRUB SERPL-MCNC: 0.3 MG/DL
BILIRUB UR QL STRIP: NEGATIVE
BUN SERPL-MCNC: 17 MG/DL (ref 10–20)
BUN/CREAT SERPL: 20 (ref 6–20)
CALCIUM SERPL-MCNC: 8.6 MG/DL (ref 8.4–10.2)
CHLORIDE SERPL-SCNC: 111 MMOL/L (ref 98–107)
CLARITY UR: CLEAR
CO2 SERPL-SCNC: 22 MMOL/L (ref 22–29)
COLOR UR: ABNORMAL
CREAT SERPL-MCNC: 0.84 MG/DL (ref 0.55–1.02)
DIFFERENTIAL METHOD BLD: ABNORMAL
EGFR (NO RACE VARIABLE) (RUSH/TITUS): 85 ML/MIN/1.73M2
EOSINOPHIL # BLD AUTO: 0.16 K/UL (ref 0–0.5)
EOSINOPHIL NFR BLD AUTO: 2.3 % (ref 1–4)
ERYTHROCYTE [DISTWIDTH] IN BLOOD BY AUTOMATED COUNT: 13.2 % (ref 11.5–14.5)
GLOBULIN SER-MCNC: 2.5 G/DL (ref 2–4)
GLUCOSE SERPL-MCNC: 95 MG/DL (ref 74–100)
GLUCOSE UR STRIP-MCNC: NORMAL MG/DL
HCT VFR BLD AUTO: 42.9 % (ref 38–47)
HGB BLD-MCNC: 13.3 G/DL (ref 12–16)
IMM GRANULOCYTES # BLD AUTO: 0.05 K/UL (ref 0–0.04)
IMM GRANULOCYTES NFR BLD: 0.7 % (ref 0–0.4)
INR BLD: 0.95
KETONES UR STRIP-SCNC: NEGATIVE MG/DL
LEUKOCYTE ESTERASE UR QL STRIP: NEGATIVE
LYMPHOCYTES # BLD AUTO: 2.42 K/UL (ref 1–4.8)
LYMPHOCYTES NFR BLD AUTO: 34.8 % (ref 27–41)
MCH RBC QN AUTO: 27.9 PG (ref 27–31)
MCHC RBC AUTO-ENTMCNC: 31 G/DL (ref 32–36)
MCV RBC AUTO: 89.9 FL (ref 80–96)
MONOCYTES # BLD AUTO: 0.51 K/UL (ref 0–0.8)
MONOCYTES NFR BLD AUTO: 7.3 % (ref 2–6)
MPC BLD CALC-MCNC: 13.1 FL (ref 9.4–12.4)
NEUTROPHILS # BLD AUTO: 3.76 K/UL (ref 1.8–7.7)
NEUTROPHILS NFR BLD AUTO: 54.2 % (ref 53–65)
NITRITE UR QL STRIP: NEGATIVE
NRBC # BLD AUTO: 0 X10E3/UL
NRBC, AUTO (.00): 0 %
NT-PROBNP SERPL-MCNC: 25 PG/ML (ref 1–125)
PH UR STRIP: 5.5 PH UNITS
PLATELET # BLD AUTO: 215 K/UL (ref 150–400)
PLATELET MORPHOLOGY: ABNORMAL
POTASSIUM SERPL-SCNC: 3.8 MMOL/L (ref 3.5–5.1)
PROT SERPL-MCNC: 5.9 G/DL (ref 6.4–8.3)
PROT UR QL STRIP: NEGATIVE
PROTHROMBIN TIME: 12.6 SECONDS (ref 11.7–14.7)
RBC # BLD AUTO: 4.77 M/UL (ref 4.2–5.4)
RBC # UR STRIP: NEGATIVE /UL
RBC MORPH BLD: NORMAL
SODIUM SERPL-SCNC: 144 MMOL/L (ref 136–145)
SP GR UR STRIP: 1.03
UROBILINOGEN UR STRIP-ACNC: 2 MG/DL
WBC # BLD AUTO: 6.95 K/UL (ref 4.5–11)

## 2025-04-21 PROCEDURE — 36415 COLL VENOUS BLD VENIPUNCTURE: CPT | Performed by: EMERGENCY MEDICINE

## 2025-04-21 PROCEDURE — 25000003 PHARM REV CODE 250: Performed by: EMERGENCY MEDICINE

## 2025-04-21 PROCEDURE — 85025 COMPLETE CBC W/AUTO DIFF WBC: CPT | Performed by: EMERGENCY MEDICINE

## 2025-04-21 PROCEDURE — 63600175 PHARM REV CODE 636 W HCPCS: Mod: UD | Performed by: EMERGENCY MEDICINE

## 2025-04-21 PROCEDURE — 83880 ASSAY OF NATRIURETIC PEPTIDE: CPT | Performed by: EMERGENCY MEDICINE

## 2025-04-21 PROCEDURE — 85730 THROMBOPLASTIN TIME PARTIAL: CPT | Performed by: EMERGENCY MEDICINE

## 2025-04-21 PROCEDURE — 81003 URINALYSIS AUTO W/O SCOPE: CPT | Performed by: EMERGENCY MEDICINE

## 2025-04-21 PROCEDURE — 80053 COMPREHEN METABOLIC PANEL: CPT | Performed by: EMERGENCY MEDICINE

## 2025-04-21 PROCEDURE — 85610 PROTHROMBIN TIME: CPT | Performed by: EMERGENCY MEDICINE

## 2025-04-21 PROCEDURE — 96372 THER/PROPH/DIAG INJ SC/IM: CPT | Performed by: EMERGENCY MEDICINE

## 2025-04-21 RX ORDER — KETOROLAC TROMETHAMINE 30 MG/ML
30 INJECTION, SOLUTION INTRAMUSCULAR; INTRAVENOUS
Status: COMPLETED | OUTPATIENT
Start: 2025-04-21 | End: 2025-04-21

## 2025-04-21 RX ADMIN — LACTULOSE 30 G: 20 SOLUTION ORAL at 01:04

## 2025-04-21 RX ADMIN — KETOROLAC TROMETHAMINE 30 MG: 30 INJECTION, SOLUTION INTRAMUSCULAR at 12:04

## 2025-04-21 NOTE — ED PROVIDER NOTES
Encounter Date: 2025       History     Chief Complaint   Patient presents with    Headache    Back Pain     C/O HEADACHE AND LOW BACK PAIN FOR A FEW DAYS     Patient complains of lower back pain in the sacral area that has worse when trying to have a bowel movement.  Patient reports this pain has been present for the past several days.  She has been straining to have a bowel movement in his noticed some bright red blood in his stools.  She has never had a GI bleed before his never had prior: Scope or GI evaluation.  Associated vague abdominal discomfort.  No associated vomiting.  Otherwise she does report some swelling to her lower extremities that has improving.  No prior history of heart failure or renal insufficiency.      Review of patient's allergies indicates:   Allergen Reactions    Penicillins      Past Medical History:   Diagnosis Date    Acute pyelonephritis     HTN (hypertension)     Kidney stone 2022    Sepsis due to gram-negative UTI 2022     Past Surgical History:   Procedure Laterality Date     SECTION       No family history on file.  Social History[1]  Review of Systems   Constitutional:  Negative for fever.   HENT:  Negative for sore throat.    Respiratory:  Negative for shortness of breath.    Cardiovascular:  Positive for leg swelling. Negative for chest pain.   Gastrointestinal:  Positive for constipation. Negative for nausea.   Genitourinary:  Negative for dysuria.   Musculoskeletal:  Positive for back pain.   Skin:  Negative for rash.   Neurological:  Positive for headaches. Negative for weakness.   Hematological:  Does not bruise/bleed easily.       Physical Exam     Initial Vitals [25 2316]   BP Pulse Resp Temp SpO2   (!) 146/99 72 18 97.7 °F (36.5 °C) 96 %      MAP       --         Physical Exam    Nursing note and vitals reviewed.  Constitutional: She appears well-developed and well-nourished.   HENT:   Head: Normocephalic and atraumatic.   Eyes: EOM are normal.  Pupils are equal, round, and reactive to light.   Neck: Neck supple. No thyromegaly present.   Normal range of motion.  Cardiovascular:  Normal rate, regular rhythm, normal heart sounds and intact distal pulses.           No murmur heard.  Pulmonary/Chest: Breath sounds normal. No respiratory distress. She has no wheezes.   Abdominal: Abdomen is soft. Bowel sounds are normal. She exhibits no distension. There is no abdominal tenderness.   Musculoskeletal:         General: Edema present. No tenderness. Normal range of motion.      Cervical back: Normal range of motion and neck supple.     Lymphadenopathy:     She has no cervical adenopathy.   Neurological: She is alert and oriented to person, place, and time. She has normal strength. No cranial nerve deficit or sensory deficit.   Skin: Skin is warm and dry. No rash noted.   Psychiatric: She has a normal mood and affect.         Medical Screening Exam   See Full Note    ED Course   Procedures  Labs Reviewed   COMPREHENSIVE METABOLIC PANEL - Abnormal       Result Value    Sodium 144      Potassium 3.8      Chloride 111 (*)     CO2 22      Anion Gap 15      Glucose 95      BUN 17      Creatinine 0.84      BUN/Creatinine Ratio 20      Calcium 8.6      Total Protein 5.9 (*)     Albumin 3.4 (*)     Globulin 2.5      A/G Ratio 1.4      Bilirubin, Total 0.3      Alk Phos 64      ALT 21      AST 27      eGFR 85     URINALYSIS, REFLEX TO URINE CULTURE - Abnormal    Color, UA Light Yellow      Clarity, UA Clear      pH, UA 5.5      Leukocytes, UA Negative      Nitrites, UA Negative      Protein, UA Negative      Glucose, UA Normal      Ketones, UA Negative      Urobilinogen, UA 2 (*)     Bilirubin, UA Negative      Blood, UA Negative      Specific Gravity, UA 1.030     CBC WITH DIFFERENTIAL - Abnormal    WBC 6.95      RBC 4.77      Hemoglobin 13.3      Hematocrit 42.9      MCV 89.9      MCH 27.9      MCHC 31.0 (*)     RDW 13.2      Platelet Count 215      MPV 13.1 (*)      Neutrophils % 54.2      Lymphocytes % 34.8      Monocytes % 7.3 (*)     Eosinophils % 2.3      Basophils % 0.7      Immature Granulocytes % 0.7 (*)     nRBC, Auto 0.0      Neutrophils, Abs 3.76      Lymphocytes, Absolute 2.42      Monocytes, Absolute 0.51      Eosinophils, Absolute 0.16      Basophils, Absolute 0.05      Immature Granulocytes, Absolute 0.05 (*)     nRBC, Absolute 0.00      Diff Type Auto     CBC MORPHOLOGY - Abnormal    Platelet Morphology Few Large Platelets (*)     RBC Morphology Normal     APTT - Normal    PTT 28.2     PROTIME-INR - Normal    PT 12.6      INR 0.95     NT-PRO NATRIURETIC PEPTIDE - Normal    ProBNP 25     CBC W/ AUTO DIFFERENTIAL    Narrative:     The following orders were created for panel order CBC auto differential.  Procedure                               Abnormality         Status                     ---------                               -----------         ------                     CBC with Differential[[id number]]       Abnormal            Final result                 Please view results for these tests on the individual orders.          Imaging Results              XR ABDOMEN, ACUTE 2 OR MORE VIEWS WITH CHEST (In process)  Result time 04/21/25 00:23:58                     Medications   lactulose 20 gram/30 mL solution Soln 30 g (has no administration in time range)   ketorolac injection 30 mg (30 mg Intramuscular Given 4/21/25 0030)     Medical Decision Making  Amount and/or Complexity of Data Reviewed  Labs: ordered.  Radiology: ordered.    Risk  Prescription drug management.               ED Course as of 04/21/25 0152   Mon Apr 21, 2025   0100 Xray abdomen/ chest shows NAD.  Copious stool visualized. [PK]      ED Course User Index  [PK] Aleksandr Mitchell MD                           Clinical Impression:   Final diagnoses:  [K59.00] Constipation, unspecified constipation type (Primary)  [K62.5] Rectal bleeding        ED Disposition Condition    Discharge Good           ED Prescriptions    None       Follow-up Information       Follow up With Specialties Details Why Contact Info    [facility] - Emergency Department Emergency Medicine Go to  As needed, If symptoms worsen [address] [place] [id number] [phone]    [facility] - Gastroenterology Gastroenterology Schedule an appointment as soon as possible for a visit   [address] [place] [id number] [phone]               [1]   Social History  Tobacco Use    Smoking status: Former     Current packs/day: 0.50     Types: Cigarettes    Smokeless tobacco: Never    Tobacco comments:     States haven't been smoking since went back to work    Substance Use Topics    Alcohol use: Yes    Drug use: Yes     Types: Marijuana        Aleksandr Mitchell MD  04/21/25 0152

## 2025-04-21 NOTE — DISCHARGE INSTRUCTIONS
Start taking a stool softener twice daily    Start use a laxative MiraLax 1 capful daily.  If you start getting constipated you can use 5-6 scopes as we discussed    Start taking Metamucil or Citrucel fiber powder.  Or you can use fiber gummies    Keep drinking at least 5-8 glasses of water per day    Follow up with primary care    Follow up with GI.  Referral has been ordered

## 2025-05-13 ENCOUNTER — TELEPHONE (OUTPATIENT)
Dept: GASTROENTEROLOGY | Facility: CLINIC | Age: 50
End: 2025-05-13
Payer: COMMERCIAL

## 2025-05-16 ENCOUNTER — HOSPITAL ENCOUNTER (EMERGENCY)
Facility: HOSPITAL | Age: 50
Discharge: HOME | End: 2025-05-16
Payer: COMMERCIAL

## 2025-05-16 VITALS
WEIGHT: 222 LBS | DIASTOLIC BLOOD PRESSURE: 99 MMHG | SYSTOLIC BLOOD PRESSURE: 152 MMHG | HEIGHT: 66 IN | OXYGEN SATURATION: 99 % | RESPIRATION RATE: 18 BRPM | TEMPERATURE: 98 F | HEART RATE: 75 BPM | BODY MASS INDEX: 35.68 KG/M2

## 2025-05-16 DIAGNOSIS — M79.642 LEFT HAND PAIN: Primary | ICD-10-CM

## 2025-05-16 PROCEDURE — 96372 THER/PROPH/DIAG INJ SC/IM: CPT | Performed by: NURSE PRACTITIONER

## 2025-05-16 PROCEDURE — 63600175 PHARM REV CODE 636 W HCPCS: Mod: JZ,TB | Performed by: NURSE PRACTITIONER

## 2025-05-16 PROCEDURE — 99284 EMERGENCY DEPT VISIT MOD MDM: CPT | Mod: 25

## 2025-05-16 RX ORDER — METHOCARBAMOL 500 MG/1
1000 TABLET, FILM COATED ORAL 3 TIMES DAILY
Qty: 30 TABLET | Refills: 0 | Status: SHIPPED | OUTPATIENT
Start: 2025-05-16 | End: 2025-05-21

## 2025-05-16 RX ORDER — ORPHENADRINE CITRATE 30 MG/ML
30 INJECTION INTRAMUSCULAR; INTRAVENOUS
Status: COMPLETED | OUTPATIENT
Start: 2025-05-16 | End: 2025-05-16

## 2025-05-16 RX ORDER — KETOROLAC TROMETHAMINE 30 MG/ML
30 INJECTION, SOLUTION INTRAMUSCULAR; INTRAVENOUS
Status: COMPLETED | OUTPATIENT
Start: 2025-05-16 | End: 2025-05-16

## 2025-05-16 RX ADMIN — KETOROLAC TROMETHAMINE 30 MG: 30 INJECTION, SOLUTION INTRAMUSCULAR at 11:05

## 2025-05-16 RX ADMIN — ORPHENADRINE CITRATE 30 MG: 30 INJECTION, SOLUTION INTRAMUSCULAR; INTRAVENOUS at 11:05

## 2025-05-16 NOTE — Clinical Note
"Dai "Daidenita Velez was seen and treated in our emergency department on 5/16/2025.  She may return to work on 05/17/2025.       If you have any questions or concerns, please don't hesitate to call.      Shana Farfan, FNP"

## 2025-05-17 NOTE — ED TRIAGE NOTES
"Pt in with cc of left thumb pain after catching a "cramp" in it today. 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

## 2025-05-18 NOTE — ED PROVIDER NOTES
"Encounter Date: 2025       History     Chief Complaint   Patient presents with    Hand Pain     Left thumb     49 year old female presents to ED with complaint of left hand pain. 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 She reports she has not been able to extend thumb since occurrence. Denies fever, chills, chest pain, shortness of breath. Patient reports use of Norvasc for HTN. Denies diuretic use .         Review of patient's allergies indicates:   Allergen Reactions    Penicillins      Past Medical History:   Diagnosis Date    Acute pyelonephritis     HTN (hypertension)     Kidney stone 2022    Sepsis due to gram-negative UTI 2022     Past Surgical History:   Procedure Laterality Date     SECTION       No family history on file.  Social History[1]  Review of Systems   Constitutional:  Negative for chills and fever.   Eyes:  Negative for photophobia.   Respiratory:  Negative for cough and shortness of breath.    Gastrointestinal:  Negative for nausea and vomiting.   Musculoskeletal:  Positive for arthralgias. Negative for gait problem.   Skin:  Negative for color change and wound.   Neurological:  Negative for dizziness and weakness.   Hematological:  Negative for adenopathy. Does not bruise/bleed easily.   Psychiatric/Behavioral:  Negative for agitation and confusion.    All other systems reviewed and are negative.      Physical Exam     Initial Vitals [25 2248]   BP Pulse Resp Temp SpO2   (!) 152/99 75 18 98.2 °F (36.8 °C) 99 %      MAP       --         Physical Exam    Nursing note and vitals reviewed.  Constitutional: She appears well-developed and well-nourished.   HENT:   Head: Normocephalic and atraumatic.   Eyes: EOM are normal. Pupils are equal, round, and reactive to light.   Neck: Neck supple.   Normal range of motion.  Cardiovascular:  Normal rate and regular rhythm.           No murmur " "heard.  Pulmonary/Chest: She has no wheezes. She has no rhonchi.   Abdominal: Abdomen is soft. She exhibits no distension. There is no abdominal tenderness.   Musculoskeletal:         General: Tenderness present. No edema.      Right hand: Tenderness present.      Cervical back: Normal range of motion and neck supple.     Lymphadenopathy:     She has no cervical adenopathy.   Neurological: She is alert and oriented to person, place, and time. No cranial nerve deficit or sensory deficit.   Skin: Skin is warm and dry. Capillary refill takes less than 2 seconds.   Psychiatric: She has a normal mood and affect. Thought content normal.         Medical Screening Exam   See Full Note    ED Course   Procedures  Labs Reviewed - No data to display       Imaging Results    None          Medications   ketorolac injection 30 mg (30 mg Intramuscular Given 5/16/25 2335)   orphenadrine injection 30 mg (30 mg Intramuscular Given 5/16/25 2335)     Medical Decision Making  49 year old female presents to ED with complaint of left hand pain. 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 She reports she has not been able to extend thumb since occurrence. Denies fever, chills, chest pain, shortness of breath. Patient reports use of Norvasc for HTN. Denies diuretic use .     IM Toradol, norflex administered  Prescription provided    Risk  Prescription drug management.                                      Clinical Impression:   Final diagnoses:  [M79.642] Left hand pain (Primary)        ED Disposition Condition    Discharge Stable          ED Prescriptions       Medication Sig Dispense Start Date End Date Auth. Provider    methocarbamoL (ROBAXIN) 500 MG Tab Take 2 tablets (1,000 mg total) by mouth 3 (three) times daily. for 5 days 30 tablet 5/16/2025 5/21/2025 Shana Farfan, NABILAP          Follow-up Information    None            [1]   Social " History  Tobacco Use    Smoking status: Former     Current packs/day: 0.50     Types: Cigarettes    Smokeless tobacco: Never    Tobacco comments:     States haven't been smoking since went back to work    Substance Use Topics    Alcohol use: Yes    Drug use: Yes     Types: Marijuana        Shana Farfan, [facility]  05/17/25 4804

## 2025-06-09 ENCOUNTER — OFFICE VISIT (OUTPATIENT)
Dept: GASTROENTEROLOGY | Facility: CLINIC | Age: 50
End: 2025-06-09
Payer: COMMERCIAL

## 2025-06-09 VITALS
HEART RATE: 64 BPM | WEIGHT: 223 LBS | DIASTOLIC BLOOD PRESSURE: 106 MMHG | SYSTOLIC BLOOD PRESSURE: 154 MMHG | BODY MASS INDEX: 35.84 KG/M2 | OXYGEN SATURATION: 97 % | HEIGHT: 66 IN

## 2025-06-09 DIAGNOSIS — K59.00 CONSTIPATION, UNSPECIFIED CONSTIPATION TYPE: ICD-10-CM

## 2025-06-09 DIAGNOSIS — K62.5 RECTAL BLEEDING: ICD-10-CM

## 2025-06-09 PROCEDURE — 99999 PR PBB SHADOW E&M-EST. PATIENT-LVL IV: CPT | Mod: PBBFAC,,, | Performed by: NURSE PRACTITIONER

## 2025-06-09 PROCEDURE — 99214 OFFICE O/P EST MOD 30 MIN: CPT | Mod: PBBFAC | Performed by: NURSE PRACTITIONER

## 2025-06-09 NOTE — PROGRESS NOTES
"    Dai Velez is a 49 y.o. female here for Constipation (Blood in stool)        PCP: No, Primary Doctor  Referring Provider: Aleksandr Mitchell Md  [id number] [place] [id number]     HPI:  Presents for report of constipation, rectal pain, and blood in stool.  Patient reports over the last month that she has had increased constipation and pain with having a bowel movement.  Reports that she is taking stool softeners.  States that she has taken MiraLax intermittently but not regularly.  She was evaluated in the ER on 2025, HGB 13.3 and HCT 42.9.  She reports that rectal pain is severe during bowel movements and that she feels like she has a "cut" to the anal area.  No report of hemorrhoids.  Reports rectal bleeding with every bowel movement.  States that she sometimes has hematochezia without stool.  No previous colonoscopy.  No known family history of colon cancer or colon polyps.  We did discuss adding MiraLax powder daily.  We will try a compounded prescription for possible fissure.    Constipation  Associated symptoms include rectal pain. Pertinent negatives include no abdominal pain, diarrhea, fever, nausea or vomiting.         ROS:  Review of Systems   Constitutional:  Negative for appetite change, fatigue, fever and unexpected weight change.   HENT:  Negative for trouble swallowing.    Gastrointestinal:  Positive for blood in stool, constipation and rectal pain. Negative for abdominal pain, anal bleeding, change in bowel habit, diarrhea, nausea, vomiting and reflux.   Musculoskeletal:  Negative for gait problem.   Integumentary:  Negative for pallor.   Psychiatric/Behavioral:  The patient is not nervous/anxious.           PMHX:  has a past medical history of Acute pyelonephritis, HTN (hypertension), Kidney stone (2022), and Sepsis due to gram-negative UTI (2022).    PSHX:  has a past surgical history that includes  " "section.    PFHX: family history is not on file.    PSlHX:  reports that she has never smoked. She has never used smokeless tobacco. She reports current alcohol use. She reports current drug use. Drug: Marijuana.        Review of patient's allergies indicates:   Allergen Reactions    Penicillins        Medication List with Changes/Refills   Current Medications    AMLODIPINE (NORVASC) 10 MG TABLET    Take 1 tablet (10 mg total) by mouth every evening.    HYDROCHLOROTHIAZIDE (HYDRODIURIL) 25 MG TABLET    Take 1 tablet (25 mg total) by mouth once daily.    ONDANSETRON (ZOFRAN-ODT) 4 MG TBDL    Take 1 tablet (4 mg total) by mouth every 6 (six) hours as needed.    OXYBUTYNIN (DITROPAN-XL) 10 MG 24 HR TABLET    Take 1 tablet (10 mg total) by mouth once daily.    POTASSIUM CHLORIDE SA (K-DUR,KLOR-CON) 20 MEQ TABLET    Take 1 tablet (20 mEq total) by mouth once daily.    SULFAMETHOXAZOLE-TRIMETHOPRIM 800-160MG (BACTRIM DS) 800-160 MG TAB    Take 1 tablet by mouth 2 (two) times daily.        Objective Findings:  Vital Signs:  BP (!) 154/106   Pulse 64   Ht 5' 6" (1.676 m)   Wt 101.2 kg (223 lb)   LMP 09/27/2023 (Approximate)   SpO2 97%   BMI 35.99 kg/m²  Body mass index is 35.99 kg/m².    Physical Exam:  Physical Exam  Vitals and nursing note reviewed.   Constitutional:       General: She is not in acute distress.     Appearance: Normal appearance.   HENT:      Mouth/Throat:      Mouth: Mucous membranes are moist.   Cardiovascular:      Rate and Rhythm: Normal rate.   Pulmonary:      Effort: Pulmonary effort is normal.   Abdominal:      General: Bowel sounds are normal. There is no distension.      Palpations: Abdomen is soft. There is no mass.      Tenderness: There is no abdominal tenderness.   Skin:     General: Skin is warm and dry.      Coloration: Skin is not jaundiced or pale.   Neurological:      Mental Status: She is alert and oriented to person, place, and time.   Psychiatric:         Mood and Affect: Mood " "normal.          Labs:  Lab Results   Component Value Date    WBC 6.95 04/21/2025    HGB 13.3 04/21/2025    HCT 42.9 04/21/2025    MCV 89.9 04/21/2025    RDW 13.2 04/21/2025     04/21/2025    LYMPH 34.8 04/21/2025    LYMPH 2.42 04/21/2025    MONO 7.3 (H) 04/21/2025    EOS 0.16 04/21/2025    BASO 0.05 04/21/2025     Lab Results   Component Value Date     04/21/2025    K 3.8 04/21/2025     (H) 04/21/2025    CO2 22 04/21/2025    GLU 95 04/21/2025    BUN 17 04/21/2025    CREATININE 0.84 04/21/2025    CALCIUM 8.6 04/21/2025    PROT 5.9 (L) 04/21/2025    ALBUMIN 3.4 (L) 04/21/2025    BILITOT 0.3 04/21/2025    ALKPHOS 64 04/21/2025    AST 27 04/21/2025    ALT 21 04/21/2025         Imaging: No results found.      Assessment:  Dai Velez is a 49 y.o. female here with:  1. Constipation, unspecified constipation type    2. Rectal bleeding          Recommendations:  1. Rectal ointment: Nitroglycerine 0.125 %, lidocaine 5% ointment: apply to rectal area twice daily  2. Sitz baths  3. Schedule colonoscopy due to rectal bleeding  4. Avoid NSAID's    Portions of this note may have been created with voice recognition software.  Occasional wrong word or "sound a like substitutions may have occurred due to inherent limitations of voice recognition software.  Please read the note carefully and recognize using contexts, where substitutions have occurred.    Diagnosis, risks, benefits, and side effects of any medications and treatment plan were discussed with the patient.  All questions were answered to the satisfaction of the patient, and patient verbalized understanding and agreement to the treatment plan.        Follow up in about 2 months (around 8/9/2025).      Order summary:  Orders Placed This Encounter    Colonoscopy       Thank you for allowing me to participate in the care of Dai Velez.      LOUISE Almeida          "

## 2025-06-18 ENCOUNTER — HOSPITAL ENCOUNTER (EMERGENCY)
Facility: HOSPITAL | Age: 50
Discharge: HOME | End: 2025-06-18
Payer: COMMERCIAL

## 2025-06-18 VITALS
HEART RATE: 50 BPM | WEIGHT: 223 LBS | RESPIRATION RATE: 19 BRPM | TEMPERATURE: 99 F | BODY MASS INDEX: 35.84 KG/M2 | HEIGHT: 66 IN | DIASTOLIC BLOOD PRESSURE: 111 MMHG | OXYGEN SATURATION: 100 % | SYSTOLIC BLOOD PRESSURE: 185 MMHG

## 2025-06-18 DIAGNOSIS — R10.9 ABDOMINAL PAIN: ICD-10-CM

## 2025-06-18 DIAGNOSIS — K59.00 CONSTIPATION, UNSPECIFIED CONSTIPATION TYPE: Primary | ICD-10-CM

## 2025-06-18 DIAGNOSIS — R55 SYNCOPE: ICD-10-CM

## 2025-06-18 DIAGNOSIS — E87.6 HYPOKALEMIA: ICD-10-CM

## 2025-06-18 LAB
ALBUMIN SERPL BCP-MCNC: 3.6 G/DL (ref 3.5–5)
ALBUMIN/GLOB SERPL: 1.2 {RATIO}
ALP SERPL-CCNC: 70 U/L (ref 40–150)
ALT SERPL W P-5'-P-CCNC: 14 U/L
ANION GAP SERPL CALCULATED.3IONS-SCNC: 11 MMOL/L (ref 7–16)
AST SERPL W P-5'-P-CCNC: 18 U/L (ref 11–45)
BASOPHILS # BLD AUTO: 0.04 K/UL (ref 0–0.2)
BASOPHILS NFR BLD AUTO: 0.6 % (ref 0–1)
BILIRUB SERPL-MCNC: 0.4 MG/DL
BILIRUB UR QL STRIP: NEGATIVE
BUN SERPL-MCNC: 10 MG/DL (ref 10–20)
BUN/CREAT SERPL: 14 (ref 6–20)
CALCIUM SERPL-MCNC: 8.1 MG/DL (ref 8.4–10.2)
CHLORIDE SERPL-SCNC: 115 MMOL/L (ref 98–107)
CLARITY UR: NORMAL
CO2 SERPL-SCNC: 22 MMOL/L (ref 22–29)
COLOR UR: NORMAL
CREAT SERPL-MCNC: 0.69 MG/DL (ref 0.55–1.02)
DIFFERENTIAL METHOD BLD: ABNORMAL
EGFR (NO RACE VARIABLE) (RUSH/TITUS): 106 ML/MIN/1.73M2
EOSINOPHIL # BLD AUTO: 0.05 K/UL (ref 0–0.5)
EOSINOPHIL NFR BLD AUTO: 0.8 % (ref 1–4)
ERYTHROCYTE [DISTWIDTH] IN BLOOD BY AUTOMATED COUNT: 13 % (ref 11.5–14.5)
GLOBULIN SER-MCNC: 3.1 G/DL (ref 2–4)
GLUCOSE SERPL-MCNC: 81 MG/DL (ref 74–100)
GLUCOSE SERPL-MCNC: 90 MG/DL (ref 70–105)
GLUCOSE SERPL-MCNC: 92 MG/DL (ref 70–105)
GLUCOSE UR STRIP-MCNC: NORMAL MG/DL
HCT VFR BLD AUTO: 38.8 % (ref 38–47)
HGB BLD-MCNC: 12.2 G/DL (ref 12–16)
IMM GRANULOCYTES # BLD AUTO: 0.03 K/UL (ref 0–0.04)
IMM GRANULOCYTES NFR BLD: 0.5 % (ref 0–0.4)
KETONES UR STRIP-SCNC: NEGATIVE MG/DL
LEUKOCYTE ESTERASE UR QL STRIP: NEGATIVE
LIPASE SERPL-CCNC: 24 U/L
LYMPHOCYTES # BLD AUTO: 1.64 K/UL (ref 1–4.8)
LYMPHOCYTES NFR BLD AUTO: 26.1 % (ref 27–41)
MCH RBC QN AUTO: 28.1 PG (ref 27–31)
MCHC RBC AUTO-ENTMCNC: 31.4 G/DL (ref 32–36)
MCV RBC AUTO: 89.4 FL (ref 80–96)
MONOCYTES # BLD AUTO: 0.57 K/UL (ref 0–0.8)
MONOCYTES NFR BLD AUTO: 9.1 % (ref 2–6)
MPC BLD CALC-MCNC: 12.8 FL (ref 9.4–12.4)
NEUTROPHILS # BLD AUTO: 3.96 K/UL (ref 1.8–7.7)
NEUTROPHILS NFR BLD AUTO: 62.9 % (ref 53–65)
NITRITE UR QL STRIP: NEGATIVE
NRBC # BLD AUTO: 0 X10E3/UL
NRBC, AUTO (.00): 0 %
PH UR STRIP: 7 PH UNITS
PLATELET # BLD AUTO: 186 K/UL (ref 150–400)
POTASSIUM SERPL-SCNC: 2.9 MMOL/L (ref 3.5–5.1)
PROT SERPL-MCNC: 6.7 G/DL (ref 6.4–8.3)
PROT UR QL STRIP: NEGATIVE
RBC # BLD AUTO: 4.34 M/UL (ref 4.2–5.4)
RBC # UR STRIP: NEGATIVE /UL
SODIUM SERPL-SCNC: 145 MMOL/L (ref 136–145)
SP GR UR STRIP: 1.01
UROBILINOGEN UR STRIP-ACNC: NORMAL MG/DL
WBC # BLD AUTO: 6.29 K/UL (ref 4.5–11)

## 2025-06-18 PROCEDURE — 82962 GLUCOSE BLOOD TEST: CPT

## 2025-06-18 PROCEDURE — 83690 ASSAY OF LIPASE: CPT | Performed by: NURSE PRACTITIONER

## 2025-06-18 PROCEDURE — 96372 THER/PROPH/DIAG INJ SC/IM: CPT | Performed by: NURSE PRACTITIONER

## 2025-06-18 PROCEDURE — 96361 HYDRATE IV INFUSION ADD-ON: CPT

## 2025-06-18 PROCEDURE — 85025 COMPLETE CBC W/AUTO DIFF WBC: CPT | Performed by: FAMILY MEDICINE

## 2025-06-18 PROCEDURE — 25000003 PHARM REV CODE 250: Performed by: NURSE PRACTITIONER

## 2025-06-18 PROCEDURE — 81003 URINALYSIS AUTO W/O SCOPE: CPT | Performed by: FAMILY MEDICINE

## 2025-06-18 PROCEDURE — 80053 COMPREHEN METABOLIC PANEL: CPT | Performed by: FAMILY MEDICINE

## 2025-06-18 PROCEDURE — 99285 EMERGENCY DEPT VISIT HI MDM: CPT | Mod: 25

## 2025-06-18 PROCEDURE — 63600175 PHARM REV CODE 636 W HCPCS: Performed by: NURSE PRACTITIONER

## 2025-06-18 PROCEDURE — 96374 THER/PROPH/DIAG INJ IV PUSH: CPT

## 2025-06-18 RX ORDER — ONDANSETRON 4 MG/1
4 TABLET, ORALLY DISINTEGRATING ORAL EVERY 6 HOURS PRN
Qty: 10 TABLET | Refills: 0 | Status: SHIPPED | OUTPATIENT
Start: 2025-06-18

## 2025-06-18 RX ORDER — ONDANSETRON HYDROCHLORIDE 2 MG/ML
4 INJECTION, SOLUTION INTRAVENOUS ONCE
Status: COMPLETED | OUTPATIENT
Start: 2025-06-18 | End: 2025-06-18

## 2025-06-18 RX ORDER — SODIUM CHLORIDE AND POTASSIUM CHLORIDE 150; 900 MG/100ML; MG/100ML
INJECTION, SOLUTION INTRAVENOUS
Status: COMPLETED | OUTPATIENT
Start: 2025-06-18 | End: 2025-06-18

## 2025-06-18 RX ORDER — DICYCLOMINE HYDROCHLORIDE 10 MG/ML
20 INJECTION INTRAMUSCULAR
Status: COMPLETED | OUTPATIENT
Start: 2025-06-18 | End: 2025-06-18

## 2025-06-18 RX ORDER — SODIUM CHLORIDE 0.9 G/100ML
500 IRRIGANT IRRIGATION
Status: COMPLETED | OUTPATIENT
Start: 2025-06-18 | End: 2025-06-18

## 2025-06-18 RX ORDER — LIDOCAINE HYDROCHLORIDE 20 MG/ML
10 JELLY TOPICAL
Status: DISCONTINUED | OUTPATIENT
Start: 2025-06-18 | End: 2025-06-18 | Stop reason: HOSPADM

## 2025-06-18 RX ORDER — PSEUDOEPHEDRINE/ACETAMINOPHEN 30MG-500MG
100 TABLET ORAL
Status: COMPLETED | OUTPATIENT
Start: 2025-06-18 | End: 2025-06-18

## 2025-06-18 RX ORDER — SYRING-NEEDL,DISP,INSUL,0.3 ML 29 G X1/2"
296 SYRINGE, EMPTY DISPOSABLE MISCELLANEOUS
Status: COMPLETED | OUTPATIENT
Start: 2025-06-18 | End: 2025-06-18

## 2025-06-18 RX ORDER — BISACODYL 5 MG
5 TABLET, DELAYED RELEASE (ENTERIC COATED) ORAL 2 TIMES DAILY
Qty: 14 TABLET | Refills: 0 | Status: SHIPPED | OUTPATIENT
Start: 2025-06-18

## 2025-06-18 RX ORDER — DICYCLOMINE HYDROCHLORIDE 20 MG/1
20 TABLET ORAL 2 TIMES DAILY
Qty: 20 TABLET | Refills: 0 | Status: SHIPPED | OUTPATIENT
Start: 2025-06-18 | End: 2025-07-18

## 2025-06-18 RX ADMIN — ONDANSETRON 4 MG: 2 INJECTION INTRAMUSCULAR; INTRAVENOUS at 01:06

## 2025-06-18 RX ADMIN — POTASSIUM CHLORIDE AND SODIUM CHLORIDE: 900; 150 INJECTION, SOLUTION INTRAVENOUS at 03:06

## 2025-06-18 RX ADMIN — DICYCLOMINE HYDROCHLORIDE 20 MG: 10 INJECTION, SOLUTION INTRAMUSCULAR at 03:06

## 2025-06-18 RX ADMIN — SODIUM CHLORIDE 500 ML: 900 IRRIGANT IRRIGATION at 03:06

## 2025-06-18 RX ADMIN — Medication 100 ML: at 03:06

## 2025-06-18 RX ADMIN — MAGNESIUM CITRATE 296 ML: 1.75 LIQUID ORAL at 03:06

## 2025-06-18 NOTE — ED NOTES
"During enema patient began expressing she was going to "pass out". Pt became diaphoretic with shallowed breathing. Pt with syncopal episode, caught by staff on toilet and assisted to wheelchair then bed. BP 70/40 while on commode. Shana NP at bedside.   "

## 2025-06-18 NOTE — ED PROVIDER NOTES
Encounter Date: 2025       History     Chief Complaint   Patient presents with    Abdominal Pain     Patient presents to er with complaint of abdominal pain and nausea x 3 days, appears in pain. Sweating in triage      49-year-old female presents to ED with complaint of abdominal pain.  Patient states that she has been having abdominal pain for unspecified amount of time.  She reports prior ED visit for same complaint.  Patient states she was seen by GI and has an upcoming colonoscopy scheduled.  Patient reports that she started having nausea and vomiting on today with the pain to umbilical area.  Patient is unable to recall last bowel movement.  She reports that she has been using MiraLax and rectal suppositories without relief of symptoms.        Review of patient's allergies indicates:   Allergen Reactions    Penicillins      Past Medical History:   Diagnosis Date    Acute pyelonephritis     HTN (hypertension)     Kidney stone 2022    Sepsis due to gram-negative UTI 2022     Past Surgical History:   Procedure Laterality Date     SECTION       No family history on file.  Social History[1]  Review of Systems   Constitutional:  Negative for chills and fever.   Eyes:  Negative for photophobia and visual disturbance.   Respiratory:  Negative for cough and shortness of breath.    Cardiovascular:  Negative for chest pain and palpitations.   Gastrointestinal:  Positive for abdominal pain, constipation and nausea. Negative for vomiting.   Genitourinary:  Negative for decreased urine volume and dysuria.   Musculoskeletal:  Negative for arthralgias and gait problem.   Skin:  Negative for color change and wound.   Neurological:  Negative for dizziness and weakness.   Hematological:  Negative for adenopathy. Does not bruise/bleed easily.   Psychiatric/Behavioral:  Negative for agitation and confusion.    All other systems reviewed and are negative.      Physical Exam     Initial Vitals [25 1125]    BP Pulse Resp Temp SpO2   (!) 170/108 79 18 98.8 °F (37.1 °C) 99 %      MAP       --         Physical Exam    Nursing note and vitals reviewed.  Constitutional: She appears well-developed and well-nourished.   HENT:   Head: Normocephalic and atraumatic.   Eyes: EOM are normal. Pupils are equal, round, and reactive to light.   Neck: Neck supple.   Normal range of motion.  Cardiovascular:  Normal rate and regular rhythm.           No murmur heard.  Pulmonary/Chest: She has no wheezes. She has no rhonchi.   Abdominal: She exhibits no distension. There is abdominal tenderness in the suprapubic area.   Musculoskeletal:         General: No tenderness or edema.      Cervical back: Normal range of motion and neck supple.     Lymphadenopathy:     She has no cervical adenopathy.   Neurological: She is alert and oriented to person, place, and time. No cranial nerve deficit or sensory deficit.   Skin: Skin is warm and dry. Capillary refill takes less than 2 seconds.   Psychiatric: She has a normal mood and affect. Thought content normal.         Medical Screening Exam   See Full Note    ED Course   Procedures  Labs Reviewed   COMPREHENSIVE METABOLIC PANEL - Abnormal       Result Value    Sodium 145      Potassium 2.9 (*)     Chloride 115 (*)     CO2 22      Anion Gap 11      Glucose 81      BUN 10      Creatinine 0.69      BUN/Creatinine Ratio 14      Calcium 8.1 (*)     Total Protein 6.7      Albumin 3.6      Globulin 3.1      A/G Ratio 1.2      Bilirubin, Total 0.4      Alk Phos 70      ALT 14      AST 18      eGFR 106     CBC WITH DIFFERENTIAL - Abnormal    WBC 6.29      RBC 4.34      Hemoglobin 12.2      Hematocrit 38.8      MCV 89.4      MCH 28.1      MCHC 31.4 (*)     RDW 13.0      Platelet Count 186      MPV 12.8 (*)     Neutrophils % 62.9      Lymphocytes % 26.1 (*)     Monocytes % 9.1 (*)     Eosinophils % 0.8 (*)     Basophils % 0.6      Immature Granulocytes % 0.5 (*)     nRBC, Auto 0.0      Neutrophils, Abs 3.96       Lymphocytes, Absolute 1.64      Monocytes, Absolute 0.57      Eosinophils, Absolute 0.05      Basophils, Absolute 0.04      Immature Granulocytes, Absolute 0.03      nRBC, Absolute 0.00      Diff Type Auto     LIPASE - Normal    Lipase 24     URINALYSIS, REFLEX TO URINE CULTURE    Color, UA Light Yellow      Clarity, UA Turbid      pH, UA 7.0      Leukocytes, UA Negative      Nitrites, UA Negative      Protein, UA Negative      Glucose, UA Normal      Ketones, UA Negative      Urobilinogen, UA Normal      Bilirubin, UA Negative      Blood, UA Negative      Specific Gravity, UA 1.013     CBC W/ AUTO DIFFERENTIAL    Narrative:     The following orders were created for panel order CBC auto differential.  Procedure                               Abnormality         Status                     ---------                               -----------         ------                     CBC with Differential[[id number]]       Abnormal            Final result                 Please view results for these tests on the individual orders.   POCT GLUCOSE MONITORING CONTINUOUS    POC Glucose 90     POCT GLUCOSE MONITORING CONTINUOUS    POC Glucose 92            Imaging Results              X-Ray Abdomen Portable (Final result)  Result time 06/18/25 12:38:39      Final result by Samuel Alves MD (06/18/25 12:38:39)                   Impression:      1. Nonobstructive bowel gas pattern.      Electronically signed by: Samuel Alves MD  Date:    06/18/2025  Time:    12:38               Narrative:    EXAMINATION:  XR ABDOMEN PORTABLE    CLINICAL HISTORY:  Unspecified abdominal pain    TECHNIQUE:  AP View(s) of the abdomen was performed.    COMPARISON:  04/21/2025    FINDINGS:  Two views abdomen supine.    Air and stool is seen within the large bowel and projected over the rectum.  No focally dilated small bowel loops.  There are no calcifications to suggest nephrolithiasis or cholelithiasis.  No findings to suggest pneumatosis.   The osseous structures are intact.  The lower lung zones are clear.                                       Medications   LIDOcaine HCl 2% urojet 10 mL (10 mLs Topical (Top) Not Given 6/18/25 1545)   ondansetron injection 4 mg (4 mg Intravenous Given 6/18/25 1300)   glycerin 99.5% topical solution 100 mL (100 mLs Rectal Given 6/18/25 1523)     And   magnesium citrate solution 296 mL (296 mLs Rectal Given 6/18/25 1523)     And   sodium chloride 0.9% irrigation 500 mL (500 mLs Rectal Given 6/18/25 1523)   0.9 % NaCl with KCl 20 mEq infusion ( Intravenous New Bag 6/18/25 1516)   dicyclomine injection 20 mg (20 mg Intramuscular Given 6/18/25 1511)     Medical Decision Making  49-year-old female presents to ED with complaint of abdominal pain.  Patient states that she has been having abdominal pain for unspecified amount of time.  She reports prior ED visit for same complaint.  Patient states she was seen by GI and has an upcoming colonoscopy scheduled.  Patient reports that she started having nausea and vomiting on today with the pain to umbilical area.  Patient is unable to recall last bowel movement.  She reports that she has been using MiraLax and rectal suppositories without relief of symptoms.    Labs, diagnostics ordered and reviewed  Radiologist interpretation reviewed    EKG ordered and reviewed  EKG significant for no ST elevation  Rate 81  Rhythm SR; LAFB  Interpreted by ED physician    Case discussed with Dr. Corry adrian enema, IV NS with 20meq KCL, Zofran administered  Prescriptions provided    Risk  OTC drugs.  Prescription drug management.                                      Clinical Impression:   Final diagnoses:  [R10.9] Abdominal pain  [R55] Syncope  [K59.00] Constipation, unspecified constipation type (Primary)  [E87.6] Hypokalemia                   [1]   Social History  Tobacco Use    Smoking status: Never    Smokeless tobacco: Never    Tobacco comments:     States haven't been smoking since  went back to work    Substance Use Topics    Alcohol use: Yes    Drug use: Yes     Types: Marijuana        Shana Farfan, [facility]  06/18/25 [id number]

## 2025-06-18 NOTE — Clinical Note
"Dai Maurer" Agustin was seen and treated in our emergency department on 6/18/2025.  She may return to work on 06/19/2025.       If you have any questions or concerns, please don't hesitate to call.       RN    "

## 2025-06-18 NOTE — DISCHARGE INSTRUCTIONS
Take 6-8 capfuls of miralax and mix inside 32ounce gatorade. Take along with 4 dulcolax for colon cleanse. Follow up with PCP in 48-72 hours. Return to ED if any new or worsening of symptoms occur.

## 2025-06-19 ENCOUNTER — TELEPHONE (OUTPATIENT)
Dept: GASTROENTEROLOGY | Facility: HOSPITAL | Age: 50
End: 2025-06-19
Payer: COMMERCIAL

## 2025-07-03 ENCOUNTER — ANESTHESIA (OUTPATIENT)
Dept: GASTROENTEROLOGY | Facility: HOSPITAL | Age: 50
End: 2025-07-03
Payer: COMMERCIAL

## 2025-07-03 ENCOUNTER — HOSPITAL ENCOUNTER (OUTPATIENT)
Dept: GASTROENTEROLOGY | Facility: HOSPITAL | Age: 50
Discharge: HOME | End: 2025-07-03
Attending: NURSE PRACTITIONER | Admitting: INTERNAL MEDICINE
Payer: COMMERCIAL

## 2025-07-03 ENCOUNTER — ANESTHESIA EVENT (OUTPATIENT)
Dept: GASTROENTEROLOGY | Facility: HOSPITAL | Age: 50
End: 2025-07-03
Payer: COMMERCIAL

## 2025-07-03 VITALS
SYSTOLIC BLOOD PRESSURE: 100 MMHG | TEMPERATURE: 98 F | BODY MASS INDEX: 37.12 KG/M2 | HEIGHT: 66 IN | HEART RATE: 55 BPM | OXYGEN SATURATION: 99 % | RESPIRATION RATE: 13 BRPM | DIASTOLIC BLOOD PRESSURE: 70 MMHG | WEIGHT: 231 LBS

## 2025-07-03 DIAGNOSIS — K62.5 RECTAL BLEEDING: ICD-10-CM

## 2025-07-03 DIAGNOSIS — D12.2 ADENOMATOUS POLYP OF ASCENDING COLON: ICD-10-CM

## 2025-07-03 DIAGNOSIS — Z12.11 SCREENING FOR MALIGNANT NEOPLASM OF COLON: Primary | ICD-10-CM

## 2025-07-03 DIAGNOSIS — K57.30 COLON, DIVERTICULOSIS: ICD-10-CM

## 2025-07-03 DIAGNOSIS — K63.5 POLYP OF SIGMOID COLON, UNSPECIFIED TYPE: ICD-10-CM

## 2025-07-03 DIAGNOSIS — K64.8 INTERNAL HEMORRHOIDS: ICD-10-CM

## 2025-07-03 PROCEDURE — 63600175 PHARM REV CODE 636 W HCPCS: Performed by: NURSE ANESTHETIST, CERTIFIED REGISTERED

## 2025-07-03 PROCEDURE — 25000003 PHARM REV CODE 250: Performed by: NURSE ANESTHETIST, CERTIFIED REGISTERED

## 2025-07-03 RX ORDER — LIDOCAINE HYDROCHLORIDE 20 MG/ML
INJECTION, SOLUTION EPIDURAL; INFILTRATION; INTRACAUDAL; PERINEURAL
Status: DISCONTINUED | OUTPATIENT
Start: 2025-07-03 | End: 2025-07-03

## 2025-07-03 RX ORDER — SODIUM CHLORIDE 0.9 % (FLUSH) 0.9 %
10 SYRINGE (ML) INJECTION EVERY 6 HOURS PRN
Status: DISCONTINUED | OUTPATIENT
Start: 2025-07-03 | End: 2025-07-04 | Stop reason: HOSPADM

## 2025-07-03 RX ORDER — DEXMEDETOMIDINE HYDROCHLORIDE 100 UG/ML
INJECTION, SOLUTION INTRAVENOUS
Status: DISCONTINUED | OUTPATIENT
Start: 2025-07-03 | End: 2025-07-03

## 2025-07-03 RX ORDER — SODIUM CHLORIDE, SODIUM LACTATE, POTASSIUM CHLORIDE, CALCIUM CHLORIDE 600; 310; 30; 20 MG/100ML; MG/100ML; MG/100ML; MG/100ML
INJECTION, SOLUTION INTRAVENOUS CONTINUOUS
Status: DISCONTINUED | OUTPATIENT
Start: 2025-07-03 | End: 2025-07-04 | Stop reason: HOSPADM

## 2025-07-03 RX ORDER — PROPOFOL 10 MG/ML
VIAL (ML) INTRAVENOUS
Status: DISCONTINUED | OUTPATIENT
Start: 2025-07-03 | End: 2025-07-03

## 2025-07-03 RX ADMIN — SODIUM CHLORIDE: 9 INJECTION, SOLUTION INTRAVENOUS at 01:07

## 2025-07-03 RX ADMIN — PROPOFOL 100 MG: 10 INJECTION, EMULSION INTRAVENOUS at 01:07

## 2025-07-03 RX ADMIN — LIDOCAINE HYDROCHLORIDE 80 MG: 20 INJECTION, SOLUTION EPIDURAL; INFILTRATION; INTRACAUDAL; PERINEURAL at 01:07

## 2025-07-03 RX ADMIN — DEXMEDETOMIDINE 12 MCG: 100 INJECTION, SOLUTION, CONCENTRATE INTRAVENOUS at 01:07

## 2025-07-03 NOTE — H&P
[facility] - Endoscopy  Gastroenterology  H&P    Patient Name: Dai Velez  MRN: [id number]  Admission Date: 7/3/2025  Code Status: Prior    Attending Provider: Yue Denise FNP   Primary Care Physician: No, Primary Doctor  Principal Problem:<principal problem not specified>    Subjective:     History of Present Illness:  This patient is a 49-year-old female who presents for her 1st screening colonoscopy.  She does complain of constipation and intermittent hematochezia.    Past Medical History:   Diagnosis Date    Acute pyelonephritis     HTN (hypertension)     Kidney stone 2022    Sepsis due to gram-negative UTI 2022       Past Surgical History:   Procedure Laterality Date     SECTION         Review of patient's allergies indicates:   Allergen Reactions    Penicillins      Family History    None       Tobacco Use    Smoking status: Never    Smokeless tobacco: Never    Tobacco comments:     States haven't been smoking since went back to work    Substance and Sexual Activity    Alcohol use: Yes    Drug use: Yes     Types: Marijuana    Sexual activity: Not Currently     Review of Systems   Constitutional: Negative.    HENT: Negative.     Respiratory: Negative.     Cardiovascular: Negative.    Gastrointestinal:  Positive for blood in stool and constipation.     Objective:     Vital Signs (Most Recent):  Temp: 98 °F (36.7 °C) (25 124)  Pulse: 70 (25)  Resp: 11 (25)  BP: (!) 182/108 (25)  SpO2: 100 % (25) Vital Signs (24h Range):  Temp:  [98 °F (36.7 °C)] 98 °F (36.7 °C)  Pulse:  [70] 70  Resp:  [11] 11  SpO2:  [100 %] 100 %  BP: (182)/(108) 182/108     Weight: 104.8 kg (231 lb) (25)  Body mass index is 37.28 kg/m².    No intake or output data in the 24 hours ending 25 1319    Lines/Drains/Airways       Peripheral Intravenous Line  Duration             Peripheral IV Single Lumen 25 1248 22 G Left;Posterior Forearm <1 day          "           Physical Exam  Vitals reviewed.   Constitutional:       General: She is not in acute distress.     Appearance: Normal appearance. She is well-developed. She is not ill-appearing.   HENT:      Head: Normocephalic and atraumatic.      Nose: Nose normal.   Eyes:      Pupils: Pupils are equal, round, and reactive to light.   Cardiovascular:      Rate and Rhythm: Normal rate and regular rhythm.   Pulmonary:      Effort: Pulmonary effort is normal.      Breath sounds: Normal breath sounds. No wheezing.   Abdominal:      General: Abdomen is flat. Bowel sounds are normal. There is no distension.      Palpations: Abdomen is soft.      Tenderness: There is no abdominal tenderness. There is no guarding.   Skin:     General: Skin is warm and dry.      Coloration: Skin is not jaundiced.   Neurological:      Mental Status: She is alert.   Psychiatric:         Attention and Perception: Attention normal.         Mood and Affect: Affect normal.         Speech: Speech normal.         Behavior: Behavior is cooperative.      Comments: Pt was calm while speaking.         Significant Labs:  CBC: No results for input(s): "WBC", "HGB", "HCT", "PLT" in the last 48 hours.  CMP: No results for input(s): "GLU", "CALCIUM", "ALBUMIN", "PROT", "NA", "K", "CO2", "CL", "BUN", "CREATININE", "ALKPHOS", "ALT", "AST", "BILITOT" in the last 48 hours.    Significant Imaging:  Imaging results within the past 24 hours have been reviewed.    Assessment/Plan:     There are no hospital problems to display for this patient.        Impression:  Screening for colon neoplasm, constipation, hematochezia  Plan: Colonoscopy today    Clinton Ponce MD  Gastroenterology  [facility] - Endoscopy  "

## 2025-07-03 NOTE — DISCHARGE INSTRUCTIONS
Internal Medicine Result Text  Procedure Date  7/3/25     Impression  Overall Impression:   2 polyps  Diverticulosis in the descending colon and sigmoid colon  Internal hemorrhoids  Digital rectal exam revealed no mass.  The colon was examined from the rectum to the cecum and diverticula were present in the descending and sigmoid colon.  Grade 1 internal hemorrhoids were present.  A small polyp was removed from the ascending colon with hot snare polypectomy and a polyp was removed from the sigmoid colon with biopsy.     Recommendation  Await pathology results, due: 7/8/2025   Repeat colonoscopy in 5 years      Disposition: Discharge patient to home in stable condition.  High-fiber diet.  No driving until tomorrow.  Follow up pathology results next week.  Follow up with PCP as scheduled, return GI clinic p.r.n..     Indication  Rectal bleeding  Screening for colon neoplasm

## 2025-07-03 NOTE — TRANSFER OF CARE
"Anesthesia Transfer of Care Note    Patient: Dai Velez    Procedure(s) Performed: * No procedures listed *    Patient location: GI    Anesthesia Type: MAC    Transport from OR: Transported from OR on room air with adequate spontaneous ventilation    Post pain: adequate analgesia    Post assessment: no apparent anesthetic complications    Post vital signs: stable    Level of consciousness: sedated and responds to stimulation    Nausea/Vomiting: no nausea/vomiting    Complications: none    Transfer of care protocol was followedComments: Good SV continue, NAD noted, VSS, RTRN      Last vitals: Visit Vitals  BP (!) 105/55 (BP Location: Right arm)   Pulse 75   Temp 36.6 °C (97.8 °F) (Oral)   Resp 17   Ht 5' 6" (1.676 m)   Wt 104.8 kg (231 lb)   LMP 09/27/2023 (Approximate)   SpO2 98%   Breastfeeding No   BMI 37.28 kg/m²     "

## 2025-07-03 NOTE — ANESTHESIA POSTPROCEDURE EVALUATION
Anesthesia Post Evaluation    Patient: Dai Velez    Procedure(s) Performed: * No procedures listed *    Final Anesthesia Type: MAC      Patient location during evaluation: GI PACU ([facility])  Patient participation: Yes- Able to Participate  Level of consciousness: awake and alert  Post-procedure vital signs: reviewed and stable  Pain management: adequate  Airway patency: patent    PONV status at discharge: No PONV  Anesthetic complications: no      Cardiovascular status: blood pressure returned to baseline, hemodynamically stable and stable  Respiratory status: unassisted  Hydration status: euvolemic  Follow-up not needed.  Comments: Refer to nursing note for pain/greer score upon discharge from recovery.               Vitals Value Taken Time   /70 07/03/25 14:10   Temp 36.6 °C (97.8 °F) 07/03/25 13:36   Pulse 67 07/03/25 14:10   Resp 13 07/03/25 14:10   SpO2 99 % 07/03/25 14:10   Vitals shown include unfiled device data.      Event Time   Out of Recovery 14:22:46         Pain/Greer Score: Greer Score: 10 (7/3/2025  1:59 PM)
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
## 2025-07-07 ENCOUNTER — RESULTS FOLLOW-UP (OUTPATIENT)
Dept: GASTROENTEROLOGY | Facility: CLINIC | Age: 50
End: 2025-07-07

## 2025-07-08 ENCOUNTER — TELEPHONE (OUTPATIENT)
Dept: GASTROENTEROLOGY | Facility: CLINIC | Age: 50
End: 2025-07-08
Payer: COMMERCIAL

## 2025-07-08 NOTE — TELEPHONE ENCOUNTER
"Attempted to call results. Received notification that says, " Caller has restrictions that prevent the completion you your call." Unable to leave message. 5 year recall placed on chart. Message sent through portal per MD.            ----- Message from Clinton Ponce MD sent at 7/7/2025  4:03 PM CDT -----  The colon polyps were tubular adenoma and hyperplastic.  Recommendation:  Repeat colonoscopy in 5 years.  ----- Message -----  From: Lab, Background User  Sent: 7/7/2025   9:23 AM CDT  To: Clinton Ponce MD    "

## 2025-08-11 ENCOUNTER — HOSPITAL ENCOUNTER (EMERGENCY)
Facility: HOSPITAL | Age: 50
Discharge: HOME | End: 2025-08-12
Attending: EMERGENCY MEDICINE
Payer: COMMERCIAL

## 2025-08-11 DIAGNOSIS — I10 HYPERTENSION, UNSPECIFIED TYPE: ICD-10-CM

## 2025-08-11 DIAGNOSIS — R51.9 NONINTRACTABLE HEADACHE, UNSPECIFIED CHRONICITY PATTERN, UNSPECIFIED HEADACHE TYPE: Primary | ICD-10-CM

## 2025-08-11 PROCEDURE — 99285 EMERGENCY DEPT VISIT HI MDM: CPT

## 2025-08-12 ENCOUNTER — PATIENT OUTREACH (OUTPATIENT)
Facility: OTHER | Age: 50
End: 2025-08-12
Payer: COMMERCIAL

## 2025-08-12 VITALS
RESPIRATION RATE: 18 BRPM | WEIGHT: 227 LBS | OXYGEN SATURATION: 95 % | BODY MASS INDEX: 36.48 KG/M2 | HEART RATE: 67 BPM | HEIGHT: 66 IN | DIASTOLIC BLOOD PRESSURE: 64 MMHG | SYSTOLIC BLOOD PRESSURE: 116 MMHG | TEMPERATURE: 98 F

## 2025-08-12 LAB
ALBUMIN SERPL BCP-MCNC: 2.8 G/DL (ref 3.5–5)
ALBUMIN/GLOB SERPL: 1.2 {RATIO}
ALP SERPL-CCNC: 52 U/L (ref 40–150)
ALT SERPL W P-5'-P-CCNC: 14 U/L
ANION GAP SERPL CALCULATED.3IONS-SCNC: 12 MMOL/L (ref 7–16)
AST SERPL W P-5'-P-CCNC: 36 U/L (ref 11–45)
BASOPHILS # BLD AUTO: 0.05 K/UL (ref 0–0.2)
BASOPHILS NFR BLD AUTO: 0.8 % (ref 0–1)
BILIRUB SERPL-MCNC: 0.3 MG/DL
BUN SERPL-MCNC: 14 MG/DL (ref 10–20)
BUN/CREAT SERPL: 17 (ref 6–20)
CALCIUM SERPL-MCNC: 8.3 MG/DL (ref 8.4–10.2)
CHLORIDE SERPL-SCNC: 113 MMOL/L (ref 98–107)
CO2 SERPL-SCNC: 24 MMOL/L (ref 22–29)
CREAT SERPL-MCNC: 0.82 MG/DL (ref 0.55–1.02)
DIFFERENTIAL METHOD BLD: ABNORMAL
EGFR (NO RACE VARIABLE) (RUSH/TITUS): 87 ML/MIN/1.73M2
EOSINOPHIL # BLD AUTO: 0.15 K/UL (ref 0–0.5)
EOSINOPHIL NFR BLD AUTO: 2.3 % (ref 1–4)
ERYTHROCYTE [DISTWIDTH] IN BLOOD BY AUTOMATED COUNT: 13.2 % (ref 11.5–14.5)
GLOBULIN SER-MCNC: 2.4 G/DL (ref 2–4)
GLUCOSE SERPL-MCNC: 103 MG/DL (ref 74–100)
HCT VFR BLD AUTO: 37.7 % (ref 38–47)
HGB BLD-MCNC: 12.1 G/DL (ref 12–16)
IMM GRANULOCYTES # BLD AUTO: 0.06 K/UL (ref 0–0.04)
IMM GRANULOCYTES NFR BLD: 0.9 % (ref 0–0.4)
LYMPHOCYTES # BLD AUTO: 2.37 K/UL (ref 1–4.8)
LYMPHOCYTES NFR BLD AUTO: 35.6 % (ref 27–41)
MAGNESIUM SERPL-MCNC: 2.1 MG/DL (ref 1.6–2.6)
MCH RBC QN AUTO: 28.3 PG (ref 27–31)
MCHC RBC AUTO-ENTMCNC: 32.1 G/DL (ref 32–36)
MCV RBC AUTO: 88.1 FL (ref 80–96)
MONOCYTES # BLD AUTO: 0.47 K/UL (ref 0–0.8)
MONOCYTES NFR BLD AUTO: 7.1 % (ref 2–6)
MPC BLD CALC-MCNC: 12.6 FL (ref 9.4–12.4)
NEUTROPHILS # BLD AUTO: 3.55 K/UL (ref 1.8–7.7)
NEUTROPHILS NFR BLD AUTO: 53.3 % (ref 53–65)
NRBC # BLD AUTO: 0 X10E3/UL
NRBC, AUTO (.00): 0 %
PLATELET # BLD AUTO: 180 K/UL (ref 150–400)
POTASSIUM SERPL-SCNC: 3.5 MMOL/L (ref 3.5–5.1)
PROT SERPL-MCNC: 5.2 G/DL (ref 6.4–8.3)
RBC # BLD AUTO: 4.28 M/UL (ref 4.2–5.4)
SODIUM SERPL-SCNC: 145 MMOL/L (ref 136–145)
WBC # BLD AUTO: 6.65 K/UL (ref 4.5–11)

## 2025-08-12 PROCEDURE — 85025 COMPLETE CBC W/AUTO DIFF WBC: CPT | Performed by: EMERGENCY MEDICINE

## 2025-08-12 PROCEDURE — 36415 COLL VENOUS BLD VENIPUNCTURE: CPT | Performed by: EMERGENCY MEDICINE

## 2025-08-12 PROCEDURE — 96374 THER/PROPH/DIAG INJ IV PUSH: CPT

## 2025-08-12 PROCEDURE — 83735 ASSAY OF MAGNESIUM: CPT | Performed by: EMERGENCY MEDICINE

## 2025-08-12 PROCEDURE — 63600175 PHARM REV CODE 636 W HCPCS: Performed by: EMERGENCY MEDICINE

## 2025-08-12 PROCEDURE — 96375 TX/PRO/DX INJ NEW DRUG ADDON: CPT

## 2025-08-12 PROCEDURE — 80053 COMPREHEN METABOLIC PANEL: CPT | Performed by: EMERGENCY MEDICINE

## 2025-08-12 RX ORDER — PROCHLORPERAZINE EDISYLATE 5 MG/ML
10 INJECTION INTRAMUSCULAR; INTRAVENOUS
Status: COMPLETED | OUTPATIENT
Start: 2025-08-12 | End: 2025-08-12

## 2025-08-12 RX ORDER — PROCHLORPERAZINE MALEATE 10 MG
10 TABLET ORAL EVERY 6 HOURS PRN
Qty: 30 TABLET | Refills: 0 | Status: SHIPPED | OUTPATIENT
Start: 2025-08-12

## 2025-08-12 RX ORDER — HYDROMORPHONE HYDROCHLORIDE 2 MG/ML
1 INJECTION, SOLUTION INTRAMUSCULAR; INTRAVENOUS; SUBCUTANEOUS
Refills: 0 | Status: COMPLETED | OUTPATIENT
Start: 2025-08-12 | End: 2025-08-12

## 2025-08-12 RX ADMIN — PROCHLORPERAZINE EDISYLATE 10 MG: 5 INJECTION INTRAMUSCULAR; INTRAVENOUS at 12:08

## 2025-08-12 RX ADMIN — HYDROMORPHONE HYDROCHLORIDE 1 MG: 2 INJECTION INTRAMUSCULAR; INTRAVENOUS; SUBCUTANEOUS at 12:08

## 2025-08-21 ENCOUNTER — HOSPITAL ENCOUNTER (EMERGENCY)
Facility: HOSPITAL | Age: 50
Discharge: HOME | End: 2025-08-22
Attending: EMERGENCY MEDICINE
Payer: COMMERCIAL

## 2025-08-21 DIAGNOSIS — R07.9 CHEST PAIN: Primary | ICD-10-CM

## 2025-08-21 LAB
ALBUMIN SERPL BCP-MCNC: 3.1 G/DL (ref 3.5–5)
ALBUMIN/GLOB SERPL: 0.9 {RATIO}
ALP SERPL-CCNC: 60 U/L (ref 40–150)
ALT SERPL W P-5'-P-CCNC: 20 U/L
ANION GAP SERPL CALCULATED.3IONS-SCNC: 13 MMOL/L (ref 7–16)
AST SERPL W P-5'-P-CCNC: 29 U/L (ref 11–45)
BASOPHILS # BLD AUTO: 0.06 K/UL (ref 0–0.2)
BASOPHILS NFR BLD AUTO: 0.8 % (ref 0–1)
BILIRUB SERPL-MCNC: 0.3 MG/DL
BUN SERPL-MCNC: 16 MG/DL (ref 10–20)
BUN/CREAT SERPL: 16 (ref 6–20)
CALCIUM SERPL-MCNC: 8.7 MG/DL (ref 8.4–10.2)
CHLORIDE SERPL-SCNC: 112 MMOL/L (ref 98–107)
CO2 SERPL-SCNC: 21 MMOL/L (ref 22–29)
CREAT SERPL-MCNC: 0.99 MG/DL (ref 0.55–1.02)
DIFFERENTIAL METHOD BLD: ABNORMAL
EGFR (NO RACE VARIABLE) (RUSH/TITUS): 70 ML/MIN/1.73M2
EOSINOPHIL # BLD AUTO: 0.13 K/UL (ref 0–0.5)
EOSINOPHIL NFR BLD AUTO: 1.8 % (ref 1–4)
ERYTHROCYTE [DISTWIDTH] IN BLOOD BY AUTOMATED COUNT: 13.5 % (ref 11.5–14.5)
FLUAV AG UPPER RESP QL IA.RAPID: NEGATIVE
FLUBV AG UPPER RESP QL IA.RAPID: NEGATIVE
GLOBULIN SER-MCNC: 3.4 G/DL (ref 2–4)
GLUCOSE SERPL-MCNC: 108 MG/DL (ref 74–100)
HCT VFR BLD AUTO: 39.7 % (ref 38–47)
HGB BLD-MCNC: 12.6 G/DL (ref 12–16)
IMM GRANULOCYTES # BLD AUTO: 0.06 K/UL (ref 0–0.04)
IMM GRANULOCYTES NFR BLD: 0.8 % (ref 0–0.4)
INR BLD: 0.94
LYMPHOCYTES # BLD AUTO: 2.69 K/UL (ref 1–4.8)
LYMPHOCYTES NFR BLD AUTO: 37 % (ref 27–41)
MCH RBC QN AUTO: 27.9 PG (ref 27–31)
MCHC RBC AUTO-ENTMCNC: 31.7 G/DL (ref 32–36)
MCV RBC AUTO: 88 FL (ref 80–96)
MONOCYTES # BLD AUTO: 0.5 K/UL (ref 0–0.8)
MONOCYTES NFR BLD AUTO: 6.9 % (ref 2–6)
MPC BLD CALC-MCNC: 12.9 FL (ref 9.4–12.4)
NEUTROPHILS # BLD AUTO: 3.83 K/UL (ref 1.8–7.7)
NEUTROPHILS NFR BLD AUTO: 52.7 % (ref 53–65)
NRBC # BLD AUTO: 0 X10E3/UL
NRBC, AUTO (.00): 0 %
NT-PROBNP SERPL-MCNC: 47 PG/ML (ref 1–125)
PLATELET # BLD AUTO: 203 K/UL (ref 150–400)
POTASSIUM SERPL-SCNC: 3.6 MMOL/L (ref 3.5–5.1)
PROT SERPL-MCNC: 6.5 G/DL (ref 6.4–8.3)
PROTHROMBIN TIME: 12.7 SECONDS (ref 11.7–14.7)
RBC # BLD AUTO: 4.51 M/UL (ref 4.2–5.4)
SARS-COV-2 RDRP RESP QL NAA+PROBE: NEGATIVE
SODIUM SERPL-SCNC: 142 MMOL/L (ref 136–145)
TROPONIN I SERPL HS-MCNC: <2.7 NG/L
WBC # BLD AUTO: 7.27 K/UL (ref 4.5–11)

## 2025-08-21 PROCEDURE — 83880 ASSAY OF NATRIURETIC PEPTIDE: CPT | Performed by: EMERGENCY MEDICINE

## 2025-08-21 PROCEDURE — 87502 INFLUENZA DNA AMP PROBE: CPT | Performed by: EMERGENCY MEDICINE

## 2025-08-21 PROCEDURE — 99285 EMERGENCY DEPT VISIT HI MDM: CPT | Mod: 25

## 2025-08-21 PROCEDURE — 80053 COMPREHEN METABOLIC PANEL: CPT | Performed by: EMERGENCY MEDICINE

## 2025-08-21 PROCEDURE — 93010 ELECTROCARDIOGRAM REPORT: CPT | Mod: ,,, | Performed by: HOSPITALIST

## 2025-08-21 PROCEDURE — 87635 SARS-COV-2 COVID-19 AMP PRB: CPT | Performed by: EMERGENCY MEDICINE

## 2025-08-21 PROCEDURE — 85610 PROTHROMBIN TIME: CPT | Performed by: EMERGENCY MEDICINE

## 2025-08-21 PROCEDURE — 93005 ELECTROCARDIOGRAM TRACING: CPT

## 2025-08-21 PROCEDURE — 84484 ASSAY OF TROPONIN QUANT: CPT | Performed by: EMERGENCY MEDICINE

## 2025-08-21 PROCEDURE — 85025 COMPLETE CBC W/AUTO DIFF WBC: CPT | Performed by: EMERGENCY MEDICINE

## 2025-08-22 ENCOUNTER — TELEPHONE (OUTPATIENT)
Dept: EMERGENCY MEDICINE | Facility: HOSPITAL | Age: 50
End: 2025-08-22
Payer: COMMERCIAL

## 2025-08-22 VITALS
RESPIRATION RATE: 10 BRPM | HEART RATE: 67 BPM | BODY MASS INDEX: 38.09 KG/M2 | SYSTOLIC BLOOD PRESSURE: 149 MMHG | OXYGEN SATURATION: 100 % | TEMPERATURE: 99 F | DIASTOLIC BLOOD PRESSURE: 92 MMHG | HEIGHT: 66 IN | WEIGHT: 237 LBS

## 2025-08-22 LAB
OHS QRS DURATION: 88 MS
OHS QTC CALCULATION: 419 MS
TROPONIN I SERPL HS-MCNC: <2.7 NG/L

## 2025-08-22 PROCEDURE — 84484 ASSAY OF TROPONIN QUANT: CPT | Performed by: EMERGENCY MEDICINE

## 2025-08-22 PROCEDURE — 36415 COLL VENOUS BLD VENIPUNCTURE: CPT | Performed by: EMERGENCY MEDICINE
